# Patient Record
Sex: FEMALE | Race: WHITE | NOT HISPANIC OR LATINO | Employment: FULL TIME | URBAN - METROPOLITAN AREA
[De-identification: names, ages, dates, MRNs, and addresses within clinical notes are randomized per-mention and may not be internally consistent; named-entity substitution may affect disease eponyms.]

---

## 2017-01-24 ENCOUNTER — OFFICE VISIT (OUTPATIENT)
Dept: URGENT CARE | Facility: URGENT CARE | Age: 57
End: 2017-01-24
Payer: COMMERCIAL

## 2017-01-24 VITALS
HEART RATE: 90 BPM | BODY MASS INDEX: 20.89 KG/M2 | HEIGHT: 66 IN | TEMPERATURE: 97.3 F | OXYGEN SATURATION: 97 % | WEIGHT: 130 LBS | SYSTOLIC BLOOD PRESSURE: 109 MMHG | DIASTOLIC BLOOD PRESSURE: 78 MMHG

## 2017-01-24 DIAGNOSIS — J20.8 ACUTE BRONCHITIS DUE TO OTHER SPECIFIED ORGANISMS: Primary | ICD-10-CM

## 2017-01-24 DIAGNOSIS — J98.01 ACUTE BRONCHOSPASM: ICD-10-CM

## 2017-01-24 LAB
DEPRECATED S PYO AG THROAT QL EIA: NORMAL
MICRO REPORT STATUS: NORMAL
SPECIMEN SOURCE: NORMAL

## 2017-01-24 PROCEDURE — 87880 STREP A ASSAY W/OPTIC: CPT | Performed by: FAMILY MEDICINE

## 2017-01-24 PROCEDURE — 99213 OFFICE O/P EST LOW 20 MIN: CPT | Performed by: FAMILY MEDICINE

## 2017-01-24 PROCEDURE — 87081 CULTURE SCREEN ONLY: CPT | Performed by: FAMILY MEDICINE

## 2017-01-24 RX ORDER — ALBUTEROL SULFATE 90 UG/1
2 AEROSOL, METERED RESPIRATORY (INHALATION) EVERY 4 HOURS PRN
Qty: 1 INHALER | Refills: 0 | Status: SHIPPED | OUTPATIENT
Start: 2017-01-24 | End: 2017-09-01

## 2017-01-24 RX ORDER — BENZONATATE 200 MG/1
200 CAPSULE ORAL 3 TIMES DAILY PRN
Qty: 30 CAPSULE | Refills: 0 | Status: SHIPPED | OUTPATIENT
Start: 2017-01-24 | End: 2017-09-01

## 2017-01-24 NOTE — MR AVS SNAPSHOT
After Visit Summary   1/24/2017    Shelia Avendaño    MRN: [id number]           Patient Information     Date Of Birth          1960        Visit Information        Provider Department      1/24/2017 7:00 PM Anamaria Pham MD [facility]        Today's Diagnoses     Acute bronchitis due to other specified organisms    -  1       Care Instructions      What Is Acute Bronchitis?  Acute or short-term bronchitis last for days or weeks. It occurs when the bronchial tubes (airways in the lungs) are irritated by a virus, bacteria, or allergen. This causes a cough that produces yellow or greenish mucus.  Inside healthy lungs    Air travels in and out of the lungs through the airways. The linings of these airways produce sticky mucus. This mucus traps particles that enter the lungs. Tiny structures called cilia then sweep the particles out of the airways.     Healthy airway: Airways are normally open. Air moves in and out easily.      Healthy cilia: Tiny, hairlike cilia sweep mucus and particles up and out of the airways.   Lungs with bronchitis  Bronchitis often occurs with a cold or the flu virus. The airways become inflamed (red and swollen). There is a deep  hacking  cough from the extra mucus. Other symptoms may include:    Wheezing    Chest discomfort    Shortness of breath    Mild fever  A second infection, this time due to bacteria, may then occur. And airways irritated by allergens or smoke are more likely to get infected.        Inflamed airway: Inflammation and extra mucus narrow the airway, causing shortness of breath.      Impaired cilia: Extra mucus impairs cilia, causing congestion and wheezing. Smoking makes the problem worse.   Making a diagnosis  A physical exam, health history, and certain tests help your healthcare provider make the diagnosis.  Health history  Your healthcare provider will ask you about your symptoms.  The exam  Your provider listens  to your chest for signs of congestion. He or she may also check your ears, nose, and throat.  Possible tests    A sputum test for bacteria. This requires a sample of mucus from the lungs.    A nasal or throat swab for bacterial infection.    A chest X-ray if your healthcare provider thinks you have pneumonia.    Tests to check for an underlying condition, such as allergies, asthma, or COPD. You may need to see a specialist for more lung function testing.  Treating a cough  The main treatment for bronchitis is easing symptoms. Avoiding smoke, allergens, and other things that trigger coughing can often help. If the infection is bacterial, you may be given antibiotics. During the illness, it's important to get plenty of sleep. To ease symptoms:    Don t smoke, and avoid secondhand smoke.    Use a humidifier, or breathe in steam from a hot shower. This may help loosen mucus.    Drink a lot of water and juice. They can soothe the throat and may help thin mucus.    Sit up or use extra pillows when in bed to help lessen coughing and congestion.    Ask your provider about using cough medicine, pain and fever medicine, or a decongestant.  Antibiotics  Most cases of bronchitis are caused by cold or flu viruses. Antibiotics don t treat viral illness. Taking antibiotics when they are not needed increases your risk of getting an infection later that is antibiotic-resistant. Your provider will prescribe antibiotics if the infection is caused by bacteria. If they are prescribed:    Take the medicine until it is used up, even if symptoms have improved. If you don t, the bronchitis may come back.    Take them as directed. For instance, some medicines should be taken with food.    Ask your provider or pharmacist what side effects are common, and what to do about them.  Follow-up care  You should see your provider again in 2 to 3 weeks. By this time, symptoms should have improved. An infection that lasts longer may mean you have a  more serious problem.  Prevention    Avoid tobacco smoke. If you smoke, quit. Stay away from smoky places. Ask friends and family not to smoke around you, or in your home or car.    Get checked for allergies.    Ask your provider about getting a yearly flu shot, and pneumococcal or pneumonia shots.    Wash your hands often. This helps reduce the chance of picking up viruses that cause colds and flu.  Call your healthcare provider if:    Symptoms worsen, or new symptoms develop.    Breathing problems worsen or  become severe.    Symptoms don t get better within a week, or within 3 days of taking antibiotics.     9424-7645 [employer]. [address] [place] [address]. All rights reserved. This information is not intended as a substitute for professional medical care. Always follow your healthcare professional's instructions.              Follow-ups after your visit        Who to contact     If you have questions or need follow up information about today's clinic visit or your schedule please contact [facility] directly at [phone].  Normal or non-critical lab and imaging results will be communicated to you by [employer], letter or phone within 4 business days after the clinic has received the results. If you do not hear from us within 7 days, please contact the clinic through [employer] or phone. If you have a critical or abnormal lab result, we will notify you by phone as soon as possible.  Submit refill requests through [employer] or call your pharmacy and they will forward the refill request to us. Please allow 3 business days for your refill to be completed.          Additional Information About Your Visit        M[employer] Information     [employer] gives you secure access to your electronic health record. If you see a primary care provider, you can also send messages to your care team and make appointments. If you have questions, please call your primary care clinic.  If you  "do not have a primary care provider, please call [phone] and they will assist you.        Care EveryWhere ID     This is your Care EveryWhere ID. This could be used by other organizations to access your [facility] medical records  [id number]        Your Vitals Were     Pulse Temperature Height BMI (Body Mass Index) Pulse Oximetry Last Period    90 97.3  F (36.3  C) (Tympanic) 5' 6\" (1.676 m) 20.99 kg/m2 97% 08/28/2013       Blood Pressure from Last 3 Encounters:   01/24/17 109/78   10/12/16 103/73   08/31/16 106/74    Weight from Last 3 Encounters:   01/24/17 130 lb (58.968 kg)   10/12/16 139 lb 9.6 oz (63.322 kg)   08/31/16 140 lb (63.504 kg)              Today, you had the following     No orders found for display         Today's Medication Changes          These changes are accurate as of: 1/24/17  8:16 PM.  If you have any questions, ask your nurse or doctor.               Start taking these medicines.        Dose/Directions    albuterol 108 (90 BASE) MCG/ACT Inhaler   Commonly known as:  PROAIR HFA/PROVENTIL HFA/VENTOLIN HFA   Used for:  Acute bronchitis due to other specified organisms   Started by:  Anamaria Pham MD        Dose:  2 puff   Inhale 2 puffs into the lungs every 4 hours as needed for shortness of breath / dyspnea or wheezing (use with a spacer)   Quantity:  1 Inhaler   Refills:  0       benzonatate 200 MG capsule   Commonly known as:  TESSALON   Used for:  Acute bronchitis due to other specified organisms   Started by:  Anamaria Pham MD        Dose:  200 mg   Take 1 capsule (200 mg) by mouth 3 times daily as needed for cough   Quantity:  30 capsule   Refills:  0            Where to get your medicines      These medications were sent to [facility] [address] AT [address] [address]    Hours:  24-hours Phone:  [phone]    - albuterol 108 (90 BASE) MCG/ACT " Inhaler  - benzonatate 200 MG capsule             Primary Care Provider Office Phone # Fax #    Billy Huynh Mk Laboy [phone]       [place] [address]  [facility] [address]        Thank you!     Thank you for choosing [facility]  for your care. Our goal is always to provide you with excellent care. Hearing back from our patients is one way we can continue to improve our services. Please take a few minutes to complete the written survey that you may receive in the mail after your visit with us. Thank you!             Your Updated Medication List - Protect others around you: Learn how to safely use, store and throw away your medicines at www.disposemymeds.org.          This list is accurate as of: 1/24/17  8:16 PM.  Always use your most recent med list.                   Brand Name Dispense Instructions for use    ACYCLOVIR PO      Take 100 mg by mouth daily       albuterol 108 (90 BASE) MCG/ACT Inhaler    PROAIR HFA/PROVENTIL HFA/VENTOLIN HFA    1 Inhaler    Inhale 2 puffs into the lungs every 4 hours as needed for shortness of breath / dyspnea or wheezing (use with a spacer)       amitriptyline 10 MG tablet    ELAVIL     Take  by mouth At Bedtime.       benzonatate 200 MG capsule    TESSALON    30 capsule    Take 1 capsule (200 mg) by mouth 3 times daily as needed for cough       calcium-magnesium 500-250 MG Tabs per tablet    CALMAG     Take 1 tablet by mouth daily       CYTOMEL PO      Take  by mouth. 1/2 tab daily       * DETROL LA 4 MG 24 hr capsule   Generic drug:  tolterodine      Take 4 mg by mouth daily       * tolterodine 4 MG 24 hr capsule    DETROL LA    90 capsule    Take 1 capsule (4 mg) by mouth daily       SYNTHROID PO      Take 188 mcg by mouth daily . 1 mg daily       * Notice:  This list has 2 medication(s) that are the same as other medications prescribed for you. Read the directions carefully, and ask your doctor or  other care provider to review them with you.

## 2017-01-25 NOTE — PATIENT INSTRUCTIONS
What Is Acute Bronchitis?  Acute or short-term bronchitis last for days or weeks. It occurs when the bronchial tubes (airways in the lungs) are irritated by a virus, bacteria, or allergen. This causes a cough that produces yellow or greenish mucus.  Inside healthy lungs    Air travels in and out of the lungs through the airways. The linings of these airways produce sticky mucus. This mucus traps particles that enter the lungs. Tiny structures called cilia then sweep the particles out of the airways.     Healthy airway: Airways are normally open. Air moves in and out easily.      Healthy cilia: Tiny, hairlike cilia sweep mucus and particles up and out of the airways.   Lungs with bronchitis  Bronchitis often occurs with a cold or the flu virus. The airways become inflamed (red and swollen). There is a deep  hacking  cough from the extra mucus. Other symptoms may include:    Wheezing    Chest discomfort    Shortness of breath    Mild fever  A second infection, this time due to bacteria, may then occur. And airways irritated by allergens or smoke are more likely to get infected.        Inflamed airway: Inflammation and extra mucus narrow the airway, causing shortness of breath.      Impaired cilia: Extra mucus impairs cilia, causing congestion and wheezing. Smoking makes the problem worse.   Making a diagnosis  A physical exam, health history, and certain tests help your healthcare provider make the diagnosis.  Health history  Your healthcare provider will ask you about your symptoms.  The exam  Your provider listens to your chest for signs of congestion. He or she may also check your ears, nose, and throat.  Possible tests    A sputum test for bacteria. This requires a sample of mucus from the lungs.    A nasal or throat swab for bacterial infection.    A chest X-ray if your healthcare provider thinks you have pneumonia.    Tests to check for an underlying condition, such as allergies, asthma, or COPD. You may need  to see a specialist for more lung function testing.  Treating a cough  The main treatment for bronchitis is easing symptoms. Avoiding smoke, allergens, and other things that trigger coughing can often help. If the infection is bacterial, you may be given antibiotics. During the illness, it's important to get plenty of sleep. To ease symptoms:    Don t smoke, and avoid secondhand smoke.    Use a humidifier, or breathe in steam from a hot shower. This may help loosen mucus.    Drink a lot of water and juice. They can soothe the throat and may help thin mucus.    Sit up or use extra pillows when in bed to help lessen coughing and congestion.    Ask your provider about using cough medicine, pain and fever medicine, or a decongestant.  Antibiotics  Most cases of bronchitis are caused by cold or flu viruses. Antibiotics don t treat viral illness. Taking antibiotics when they are not needed increases your risk of getting an infection later that is antibiotic-resistant. Your provider will prescribe antibiotics if the infection is caused by bacteria. If they are prescribed:    Take the medicine until it is used up, even if symptoms have improved. If you don t, the bronchitis may come back.    Take them as directed. For instance, some medicines should be taken with food.    Ask your provider or pharmacist what side effects are common, and what to do about them.  Follow-up care  You should see your provider again in 2 to 3 weeks. By this time, symptoms should have improved. An infection that lasts longer may mean you have a more serious problem.  Prevention    Avoid tobacco smoke. If you smoke, quit. Stay away from smoky places. Ask friends and family not to smoke around you, or in your home or car.    Get checked for allergies.    Ask your provider about getting a yearly flu shot, and pneumococcal or pneumonia shots.    Wash your hands often. This helps reduce the chance of picking up viruses that cause colds and flu.  Call  your healthcare provider if:    Symptoms worsen, or new symptoms develop.    Breathing problems worsen or  become severe.    Symptoms don t get better within a week, or within 3 days of taking antibiotics.     7189-8366 [employer]. [address] [place] [address], [place] [address]. All rights reserved. This information is not intended as a substitute for professional medical care. Always follow your healthcare professional's instructions.

## 2017-01-25 NOTE — PROGRESS NOTES
SUBJECTIVE:   Shelia Avendaño is a 56 year old female who complains of painful cough for 3 days preceded by a cold for 3 weeks. Felt body aches, headache, fatigue, malaise, runny nose and sore throat with coughing starting 3 days ago.  No dyspnea.  No pleuritic chest pain.  Had a flu shot.   had similar symptoms.  She denies a history of no other unusual symptoms. She denies a history of asthma. Patient does not smoke cigarettes.     OBJECTIVE:  Vitals as noted by Nurse/MA above.  Appearance: in mild apparent distress secondary to coughing  ENT- bilateral TM normal without fluid or infection, neck without nodes, pharynx erythematous without exudate, sinuses nontender and nasal mucosa congested.   Chest - no tachypnea, retractions or cyanosis, mild and transient expiratory wheezing heard diffusely throughout both lungs and S1, S2 normal, no murmur, no gallop, rate regular.    Labs:   Results for orders placed or performed in visit on 01/24/17   Strep, Rapid Screen   Result Value Ref Range    Specimen Description Throat     Rapid Strep A Screen       NEGATIVE: No Group A streptococcal antigen detected by immunoassay, await   culture report.      Micro Report Status FINAL 01/24/2017       ASSESSMENT:   Bronchitis  Bronchospasm    PLAN:  As per orders.  Note for work given.   Symptomatic therapy suggested: push fluids, rest, use vaporizer or mist needed  and use cough suppressant of choice as needed. Call or return to clinic prn if these symptoms worsen or fail to improve as anticipated.  Anamaria Yoon MD

## 2017-01-25 NOTE — NURSING NOTE
"Chief Complaint   Patient presents with     Urgent Care     Cough     c/o cough for 3 days       Initial /78 mmHg  Pulse 90  Temp(Src) 97.3  F (36.3  C) (Tympanic)  Ht 5' 6\" (1.676 m)  Wt 130 lb (58.968 kg)  BMI 20.99 kg/m2  SpO2 97%  LMP 08/28/2013 Estimated body mass index is 20.99 kg/(m^2) as calculated from the following:    Height as of this encounter: 5' 6\" (1.676 m).    Weight as of this encounter: 130 lb (58.968 kg).  BP completed using cuff size: regular  Elba Whiteside MA    "

## 2017-01-26 ENCOUNTER — TELEPHONE (OUTPATIENT)
Dept: URGENT CARE | Facility: URGENT CARE | Age: 57
End: 2017-01-26

## 2017-01-26 LAB
BACTERIA SPEC CULT: ABNORMAL
MICRO REPORT STATUS: ABNORMAL
SPECIMEN SOURCE: ABNORMAL

## 2017-01-27 NOTE — TELEPHONE ENCOUNTER
Pt called as she isn't feeling better, having coughing fits but noted by Verónica Howard PA-C that strep culture was positive for strep A. Rx for a Zpack called into [facility] on [address]. Pt informed  samuel carrera

## 2017-02-06 ENCOUNTER — TELEPHONE (OUTPATIENT)
Dept: FAMILY MEDICINE | Facility: CLINIC | Age: 57
End: 2017-02-06

## 2017-03-01 ENCOUNTER — OFFICE VISIT (OUTPATIENT)
Dept: FAMILY MEDICINE | Facility: CLINIC | Age: 57
End: 2017-03-01
Payer: COMMERCIAL

## 2017-03-01 VITALS
BODY MASS INDEX: 22.18 KG/M2 | RESPIRATION RATE: 16 BRPM | DIASTOLIC BLOOD PRESSURE: 69 MMHG | HEIGHT: 66 IN | TEMPERATURE: 98.7 F | SYSTOLIC BLOOD PRESSURE: 101 MMHG | HEART RATE: 89 BPM | OXYGEN SATURATION: 95 % | WEIGHT: 138 LBS

## 2017-03-01 DIAGNOSIS — R53.83 OTHER FATIGUE: Primary | ICD-10-CM

## 2017-03-01 DIAGNOSIS — R35.0 URINARY FREQUENCY: ICD-10-CM

## 2017-03-01 DIAGNOSIS — R07.0 THROAT PAIN: ICD-10-CM

## 2017-03-01 LAB
ALBUMIN UR-MCNC: NEGATIVE MG/DL
APPEARANCE UR: CLEAR
BASOPHILS # BLD AUTO: 0 10E9/L (ref 0–0.2)
BASOPHILS NFR BLD AUTO: 0.3 %
BILIRUB UR QL STRIP: NEGATIVE
COLOR UR AUTO: YELLOW
DEPRECATED S PYO AG THROAT QL EIA: NORMAL
DIFFERENTIAL METHOD BLD: NORMAL
EOSINOPHIL # BLD AUTO: 0.1 10E9/L (ref 0–0.7)
EOSINOPHIL NFR BLD AUTO: 0.8 %
ERYTHROCYTE [DISTWIDTH] IN BLOOD BY AUTOMATED COUNT: 12.2 % (ref 10–15)
GLUCOSE UR STRIP-MCNC: NEGATIVE MG/DL
HCT VFR BLD AUTO: 38.1 % (ref 35–47)
HETEROPH AB SER QL: NEGATIVE
HGB BLD-MCNC: 12.7 G/DL (ref 11.7–15.7)
HGB UR QL STRIP: NEGATIVE
KETONES UR STRIP-MCNC: NEGATIVE MG/DL
LEUKOCYTE ESTERASE UR QL STRIP: NEGATIVE
LYMPHOCYTES # BLD AUTO: 2.5 10E9/L (ref 0.8–5.3)
LYMPHOCYTES NFR BLD AUTO: 31.2 %
MCH RBC QN AUTO: 31 PG (ref 26.5–33)
MCHC RBC AUTO-ENTMCNC: 33.3 G/DL (ref 31.5–36.5)
MCV RBC AUTO: 93 FL (ref 78–100)
MICRO REPORT STATUS: NORMAL
MONOCYTES # BLD AUTO: 0.6 10E9/L (ref 0–1.3)
MONOCYTES NFR BLD AUTO: 7.4 %
NEUTROPHILS # BLD AUTO: 4.8 10E9/L (ref 1.6–8.3)
NEUTROPHILS NFR BLD AUTO: 60.3 %
NITRATE UR QL: NEGATIVE
PH UR STRIP: 6 PH (ref 5–7)
PLATELET # BLD AUTO: 245 10E9/L (ref 150–450)
RBC # BLD AUTO: 4.1 10E12/L (ref 3.8–5.2)
SP GR UR STRIP: 1.01 (ref 1–1.03)
SPECIMEN SOURCE: NORMAL
URN SPEC COLLECT METH UR: NORMAL
UROBILINOGEN UR STRIP-ACNC: 0.2 EU/DL (ref 0.2–1)
WBC # BLD AUTO: 8 10E9/L (ref 4–11)

## 2017-03-01 PROCEDURE — 87880 STREP A ASSAY W/OPTIC: CPT | Performed by: FAMILY MEDICINE

## 2017-03-01 PROCEDURE — 81003 URINALYSIS AUTO W/O SCOPE: CPT | Performed by: FAMILY MEDICINE

## 2017-03-01 PROCEDURE — 87081 CULTURE SCREEN ONLY: CPT | Performed by: FAMILY MEDICINE

## 2017-03-01 PROCEDURE — 99213 OFFICE O/P EST LOW 20 MIN: CPT | Performed by: FAMILY MEDICINE

## 2017-03-01 PROCEDURE — 85025 COMPLETE CBC W/AUTO DIFF WBC: CPT | Performed by: FAMILY MEDICINE

## 2017-03-01 PROCEDURE — 80053 COMPREHEN METABOLIC PANEL: CPT | Performed by: FAMILY MEDICINE

## 2017-03-01 PROCEDURE — 36415 COLL VENOUS BLD VENIPUNCTURE: CPT | Performed by: FAMILY MEDICINE

## 2017-03-01 PROCEDURE — 86308 HETEROPHILE ANTIBODY SCREEN: CPT | Performed by: FAMILY MEDICINE

## 2017-03-01 NOTE — NURSING NOTE
"Chief Complaint   Patient presents with     Cough       Initial /69 (BP Location: Left arm)  Pulse 89  Temp 98.7  F (37.1  C) (Tympanic)  Resp 16  Ht 5' 6\" (1.676 m)  Wt 138 lb (62.6 kg)  LMP 08/28/2013  SpO2 95%  BMI 22.27 kg/m2 Estimated body mass index is 22.27 kg/(m^2) as calculated from the following:    Height as of this encounter: 5' 6\" (1.676 m).    Weight as of this encounter: 138 lb (62.6 kg).  Medication Reconciliation: complete     Mague Dumas CMA      "

## 2017-03-01 NOTE — PROGRESS NOTES
"  SUBJECTIVE:                                                    Shelia Avendaño is a 56 year old female who presents to clinic today for the following health issues:      RESPIRATORY SYMPTOMS      Duration: 5 weeks    Description  nasal congestion, rhinorrhea, sore throat, cough, headache, fatigue/malaise and nausea    Severity: moderate    Accompanying signs and symptoms: None    History (predisposing factors):  bronchitis    Precipitating or alleviating factors: None    Therapies tried and outcome:  none     She was treated for strep and bronchitis over a month ago, but she is still feeling very fatigued.  The sore throat is better (she notes she has had a chronic sore throat any problem with her vocal cords for 25 years), as is her cough.  No fever.  She notes she is s/p surgery for breast cancer and completed radiation at the end of December.  She states she started Tamoxifen about 6 weeks ago, but was advised that this does not cause fatigue.  She also has a long standing history of sleep disorder; she notes she has seen sleep management and does not have YOLIE and was recommended to take a sleeping aid . She has most recently been taking trazodone, which did seem to help her sleep a little longer (6 hours rather than 4), but she still wakes up a lot.    She has noticed some recent urinary frequency, but no dysuria or urge.  No change in BMs.  She has had some low-grade nausea with the fatigue, no vomiting.    She has not had a change of her thyroid dose, and is followed by her endocrinologist.    She reports a history of \"chronic lyme's disease.\"      She works as a psychologist.         Problem list and histories reviewed & adjusted, as indicated.  Additional history: as documented    Patient Active Problem List   Diagnosis     Closed fracture of one or more phalanges of foot     Dysphonia     Osteopenia     Postablative hypothyroidism     Malignant neoplasm of right female breast, unspecified site of breast " "(H)     Past Surgical History   Procedure Laterality Date     Tonsillectomy         Social History   Substance Use Topics     Smoking status: Never Smoker     Smokeless tobacco: Never Used     Alcohol use No     Family History   Problem Relation Age of Onset     Prostate Cancer Father 55     Thyroid Disease Mother          Current Outpatient Prescriptions   Medication Sig Dispense Refill     UNABLE TO FIND MEDICATION NAME: tomoxifin       albuterol (PROAIR HFA/PROVENTIL HFA/VENTOLIN HFA) 108 (90 BASE) MCG/ACT Inhaler Inhale 2 puffs into the lungs every 4 hours as needed for shortness of breath / dyspnea or wheezing (use with a spacer) 1 Inhaler 0     benzonatate (TESSALON) 200 MG capsule Take 1 capsule (200 mg) by mouth 3 times daily as needed for cough 30 capsule 0     tolterodine (DETROL LA) 4 MG 24 hr capsule Take 1 capsule (4 mg) by mouth daily 90 capsule 1     tolterodine (DETROL LA) 4 MG 24 hr capsule Take 4 mg by mouth daily       calcium-magnesium (CALMAG) 500-250 MG TABS Take 1 tablet by mouth daily       ACYCLOVIR PO Take 100 mg by mouth daily        amitriptyline (ELAVIL) 10 MG tablet Take  by mouth At Bedtime.       Levothyroxine Sodium (SYNTHROID PO) Take 188 mcg by mouth daily . 1 mg daily       Liothyronine Sodium (CYTOMEL PO) Take  by mouth. 1/2 tab daily       No Known Allergies    Reviewed and updated as needed this visit by clinical staff       Reviewed and updated as needed this visit by Provider         ROS:  Constitutional, HEENT, cardiovascular, pulmonary, gi and gu systems are negative, except as otherwise noted.    OBJECTIVE:                                                    /69 (BP Location: Left arm)  Pulse 89  Temp 98.7  F (37.1  C) (Tympanic)  Resp 16  Ht 5' 6\" (1.676 m)  Wt 138 lb (62.6 kg)  LMP 08/28/2013  SpO2 95%  BMI 22.27 kg/m2  Body mass index is 22.27 kg/(m^2).  GENERAL APPEARANCE: healthy, alert and no distress  EYES: Eyes grossly normal to inspection, PERRL and " conjunctivae and sclerae normal  HENT: ear canals and TM's normal and nose and mouth without ulcers or lesions  NECK: no adenopathy, no asymmetry, masses, or scars and thyroid normal to palpation  RESP: lungs clear to auscultation - no rales, rhonchi or wheezes  CV: regular rates and rhythm, normal S1 S2, no S3 or S4 and no murmur, click or rub  ABDOMEN: soft, nontender, without hepatosplenomegaly or masses and bowel sounds normal    Diagnostic Test Results:  Results for orders placed or performed in visit on 03/01/17 (from the past 24 hour(s))   Strep, Rapid Screen   Result Value Ref Range    Specimen Description Throat     Rapid Strep A Screen       NEGATIVE: No Group A streptococcal antigen detected by immunoassay, await   culture report.      Micro Report Status FINAL 03/01/2017    *UA reflex to Microscopic and Culture ([facility] (except [place] and [place])   Result Value Ref Range    Color Urine Yellow     Appearance Urine Clear     Glucose Urine Negative NEG mg/dL    Bilirubin Urine Negative NEG    Ketones Urine Negative NEG mg/dL    Specific Gravity Urine 1.015 1.003 - 1.035    Blood Urine Negative NEG    pH Urine 6.0 5.0 - 7.0 pH    Protein Albumin Urine Negative NEG mg/dL    Urobilinogen Urine 0.2 0.2 - 1.0 EU/dL    Nitrite Urine Negative NEG    Leukocyte Esterase Urine Negative NEG    Source Midstream Urine         ASSESSMENT/PLAN:                                                    Fatigue: after strep and bronchitis episode in January, in the context of recent radiation treatment for breast cancer, thyroid disease, chronic Lyme's disease and chronically poor sleep.  I discussed that this is most likely multifactorial.  I will check on routine labs with CBC, CMP (monitor tamoxifen), and will also check a UA given her complaint of urinary frequency (may see UTI with tamoxifen).  She plans to f/u with her endocrinologist for thyroid testing.     Carmel Gibson,  MD  [facility]

## 2017-03-01 NOTE — MR AVS SNAPSHOT
"              After Visit Summary   3/1/2017    Shelia Avendaño    MRN: [id number]           Patient Information     Date Of Birth          1960        Visit Information        Provider Department      3/1/2017 5:00 PM Carmel Gibson MD [facility]        Today's Diagnoses     Other fatigue    -  1    Throat pain        Urinary frequency           Follow-ups after your visit        Who to contact     If you have questions or need follow up information about today's clinic visit or your schedule please contact [facility] directly at [phone].  Normal or non-critical lab and imaging results will be communicated to you by Direct Access Softwarehart, letter or phone within 4 business days after the clinic has received the results. If you do not hear from us within 7 days, please contact the clinic through [employer] or phone. If you have a critical or abnormal lab result, we will notify you by phone as soon as possible.  Submit refill requests through Sw[employer] or call your pharmacy and they will forward the refill request to us. Please allow 3 business days for your refill to be completed.          Additional Information About Your Visit        MyChart Information     S[employer] gives you secure access to your electronic health record. If you see a primary care provider, you can also send messages to your care team and make appointments. If you have questions, please call your primary care clinic.  If you do not have a primary care provider, please call [phone] and they will assist you.        Care EveryWhere ID     This is your Care EveryWhere ID. This could be used by other organizations to access your [place] medical records  [id number]        Your Vitals Were     Pulse Temperature Respirations Height Last Period Pulse Oximetry    89 98.7  F (37.1  C) (Tympanic) 16 5' 6\" (1.676 m) 08/28/2013 95%    BMI (Body Mass Index)                   22.27 kg/m2            Blood Pressure from " Last 3 Encounters:   03/01/17 101/69   01/24/17 109/78   10/12/16 103/73    Weight from Last 3 Encounters:   03/01/17 138 lb (62.6 kg)   01/24/17 130 lb (59 kg)   10/12/16 139 lb 9.6 oz (63.3 kg)              We Performed the Following     *UA reflex to Microscopic and Culture ([facility], [place] and [facility] (except [place] and [place])     Beta strep group A culture     CBC with platelets and differential     Comprehensive metabolic panel     Mononucleosis screen     Strep, Rapid Screen        Primary Care Provider Office Phone # Fax #    Billy Huynh Mk Laboy [phone]       [address] [address]        Thank you!     Thank you for choosing [facility]  for your care. Our goal is always to provide you with excellent care. Hearing back from our patients is one way we can continue to improve our services. Please take a few minutes to complete the written survey that you may receive in the mail after your visit with us. Thank you!             Your Updated Medication List - Protect others around you: Learn how to safely use, store and throw away your medicines at www.disposemymeds.org.          This list is accurate as of: 3/1/17  5:40 PM.  Always use your most recent med list.                   Brand Name Dispense Instructions for use    ACYCLOVIR PO      Take 100 mg by mouth daily       albuterol 108 (90 BASE) MCG/ACT Inhaler    PROAIR HFA/PROVENTIL HFA/VENTOLIN HFA    1 Inhaler    Inhale 2 puffs into the lungs every 4 hours as needed for shortness of breath / dyspnea or wheezing (use with a spacer)       amitriptyline 10 MG tablet    ELAVIL     Take  by mouth At Bedtime.       benzonatate 200 MG capsule    TESSALON    30 capsule    Take 1 capsule (200 mg) by mouth 3 times daily as needed for cough       calcium-magnesium 500-250 MG Tabs per tablet    CALMAG     Take 1 tablet by mouth daily       CYTOMEL PO      Take   by mouth. 1/2 tab daily       * DETROL LA 4 MG 24 hr capsule   Generic drug:  tolterodine      Take 4 mg by mouth daily       * tolterodine 4 MG 24 hr capsule    DETROL LA    90 capsule    Take 1 capsule (4 mg) by mouth daily       SYNTHROID PO      Take 188 mcg by mouth daily . 1 mg daily       UNABLE TO FIND      MEDICATION NAME: tomoxifin       * Notice:  This list has 2 medication(s) that are the same as other medications prescribed for you. Read the directions carefully, and ask your doctor or other care provider to review them with you.

## 2017-03-02 LAB
ALBUMIN SERPL-MCNC: 4.1 G/DL (ref 3.4–5)
ALP SERPL-CCNC: 43 U/L (ref 40–150)
ALT SERPL W P-5'-P-CCNC: 25 U/L (ref 0–50)
ANION GAP SERPL CALCULATED.3IONS-SCNC: 8 MMOL/L (ref 3–14)
AST SERPL W P-5'-P-CCNC: 18 U/L (ref 0–45)
BILIRUB SERPL-MCNC: 0.3 MG/DL (ref 0.2–1.3)
BUN SERPL-MCNC: 16 MG/DL (ref 7–30)
CALCIUM SERPL-MCNC: 8.9 MG/DL (ref 8.5–10.1)
CHLORIDE SERPL-SCNC: 106 MMOL/L (ref 94–109)
CO2 SERPL-SCNC: 26 MMOL/L (ref 20–32)
CREAT SERPL-MCNC: 0.76 MG/DL (ref 0.52–1.04)
GFR SERPL CREATININE-BSD FRML MDRD: 79 ML/MIN/1.7M2
GLUCOSE SERPL-MCNC: 80 MG/DL (ref 70–99)
POTASSIUM SERPL-SCNC: 3.9 MMOL/L (ref 3.4–5.3)
PROT SERPL-MCNC: 7.2 G/DL (ref 6.8–8.8)
SODIUM SERPL-SCNC: 140 MMOL/L (ref 133–144)

## 2017-03-03 LAB
BACTERIA SPEC CULT: NORMAL
MICRO REPORT STATUS: NORMAL
SPECIMEN SOURCE: NORMAL

## 2017-04-06 ENCOUNTER — OFFICE VISIT (OUTPATIENT)
Dept: FAMILY MEDICINE | Facility: CLINIC | Age: 57
End: 2017-04-06
Payer: COMMERCIAL

## 2017-04-06 VITALS
WEIGHT: 138 LBS | DIASTOLIC BLOOD PRESSURE: 68 MMHG | OXYGEN SATURATION: 96 % | BODY MASS INDEX: 22.27 KG/M2 | HEART RATE: 88 BPM | TEMPERATURE: 98.7 F | SYSTOLIC BLOOD PRESSURE: 102 MMHG

## 2017-04-06 DIAGNOSIS — J06.9 UPPER RESPIRATORY TRACT INFECTION, UNSPECIFIED TYPE: Primary | ICD-10-CM

## 2017-04-06 PROCEDURE — 99213 OFFICE O/P EST LOW 20 MIN: CPT | Performed by: NURSE PRACTITIONER

## 2017-04-06 NOTE — PROGRESS NOTES
SUBJECTIVE:                                                    Shelia Avendaño is a 56 year old female who presents to clinic today for the following health issues:      RESPIRATORY SYMPTOMS      Duration: 10 days and getting worst    Description  nasal congestion, rhinorrhea, cough and headache    Severity: moderate    Accompanying signs and symptoms: tightness around the chest and fatigue     History (predisposing factors):  strep exposure in JAN2017    Precipitating or alleviating factors: stress, work and being awake    Therapies tried and outcome:  rest and fluids oral decongestant and inhaler      Symptom started 10 days ago as URI, over the weekend settled in chest, cough which has improved the last several days.  Cough was productive.  Was improving yesterday but worse again today.  Generalized headache, cough.  No facial pressure.  Does have congestion.  No wheezing or shortness of breath.  No fever.    Similar illness in January.    Started on tamoxifen mid January.  Also started trazodone for sleep.  Started tapering off trazodone with improving energy levels.  Tried going off it completely and didn't sleep at all.    10mg amitriptyline x 10 years for chronic lymes.      Works in [employer].  Very busy season.      Past medical history:  History or right breast cancer diagnosed on screening mammo 10/2016, s/p lumpectomy.    Hypothyroid due to radiation tx for hyperthyroidism, sees her usual FP for monitoring and also for chronic lymes disease.     Chronic sleep disorder since lyme's disease, taking amitriptyline and a variety of herbal supplements.    Postmenopausal x2-3 years. Using estradiol 10mg vaginal tablet. Has discussed this with her surgeon and will discuss with oncologist.     Overactive bladder: she has symptoms infrequently, so only takes detrol LA as needed      Patient Active Problem List   Diagnosis     Closed fracture of one or more phalanges of foot     Dysphonia      Osteopenia     Postablative hypothyroidism     Malignant neoplasm of right female breast, unspecified site of breast (H)     Past Surgical History:   Procedure Laterality Date     TONSILLECTOMY         Social History   Substance Use Topics     Smoking status: Never Smoker     Smokeless tobacco: Never Used     Alcohol use No     Family History   Problem Relation Age of Onset     Prostate Cancer Father 55     Thyroid Disease Mother          Current Outpatient Prescriptions   Medication Sig Dispense Refill     UNABLE TO FIND MEDICATION NAME: tomoxifin       albuterol (PROAIR HFA/PROVENTIL HFA/VENTOLIN HFA) 108 (90 BASE) MCG/ACT Inhaler Inhale 2 puffs into the lungs every 4 hours as needed for shortness of breath / dyspnea or wheezing (use with a spacer) 1 Inhaler 0     benzonatate (TESSALON) 200 MG capsule Take 1 capsule (200 mg) by mouth 3 times daily as needed for cough 30 capsule 0     tolterodine (DETROL LA) 4 MG 24 hr capsule Take 1 capsule (4 mg) by mouth daily 90 capsule 1     tolterodine (DETROL LA) 4 MG 24 hr capsule Take 4 mg by mouth daily       calcium-magnesium (CALMAG) 500-250 MG TABS Take 1 tablet by mouth daily       ACYCLOVIR PO Take 100 mg by mouth daily        amitriptyline (ELAVIL) 10 MG tablet Take  by mouth At Bedtime.       Levothyroxine Sodium (SYNTHROID PO) Take 188 mcg by mouth daily . 1 mg daily       Liothyronine Sodium (CYTOMEL PO) Take  by mouth. 1/2 tab daily         ROS:  Const, HEENT, Resp as above, otherwise negative       OBJECTIVE:                                                    /68 (BP Location: Left arm, Patient Position: Chair, Cuff Size: Adult Regular)  Pulse 88  Temp 98.7  F (37.1  C) (Tympanic)  Wt 138 lb (62.6 kg)  LMP 08/28/2013  SpO2 96%  BMI 22.27 kg/m2   GENERAL APPEARANCE: healthy, alert and no distress  EYES: Eyes grossly normal to inspection and conjunctivae and sclerae normal  HENT: ear canals and TM's normal and nose and mouth without ulcers or lesions.   Tonsils not visible bilaterally.  No pain with sinus palpation.  NECK: no adenopathy  RESP: lungs clear to auscultation - no rales, rhonchi or wheezes  CV: regular rates and rhythm, normal S1 S2, no S3 or S4 and no murmur, click or rub  PSYCH: mentation appears normal and affect normal/bright        ASSESSMENT/PLAN:                                                    (J06.9) Upper respiratory tract infection, unspecified type  (primary encounter diagnosis)  Plan: discussed likely viral etiology with self limited course, recommend symptomatic cares, see patient instructions.  Follow up if not improving by early next week, given duration and double sickening would consider tx for sinusitis with augmentin    See Patient Instructions    Helen Moscoso, ALEXA  [facility]    Patient Instructions   1.  I think you have a viral infection that will improve with time.      For nasal congestion start afrin nasal spray 2-3 sprays to each nostril twice a day for 3 days.      Start sinus rinse like neti pot 2-3 times a day.  Use distilled or boiled water, mix with salt,    to 1 teaspoon of salt to each 16 ounces (two cups) of warm water.      Consider over the counter antihistamine like allegra or zyrtec to dry up secretions. Plenty of fluids and rest. Ibuprofen and heat packs for facial pain/pressure and headache.      mychart me if worsening, fever above 100.2, or not improving in the next 3-4 days

## 2017-04-06 NOTE — MR AVS SNAPSHOT
After Visit Summary   4/6/2017    Shelia Avendaño    MRN: [id number]           Patient Information     Date Of Birth          1960        Visit Information        Provider Department      4/6/2017 4:20 PM Helen Moscoso APRN CNP [facility]        Today's Diagnoses     Upper respiratory tract infection, unspecified type    -  1      Care Instructions    1.  I think you have a viral infection that will improve with time.      For nasal congestion start afrin nasal spray 2-3 sprays to each nostril twice a day for 3 days.      Start sinus rinse like neti pot 2-3 times a day.  Use distilled or boiled water, mix with salt,    to 1 teaspoon of salt to each 16 ounces (two cups) of warm water.      Consider over the counter antihistamine like allegra or zyrtec to dry up secretions. Plenty of fluids and rest. Ibuprofen and heat packs for facial pain/pressure and headache.      mychart me if worsening, fever above 100.2, or not improving in the next 3-4 days          Follow-ups after your visit        Who to contact     If you have questions or need follow up information about today's clinic visit or your schedule please contact [facility] directly at [phone].  Normal or non-critical lab and imaging results will be communicated to you by MyChart, letter or phone within 4 business days after the clinic has received the results. If you do not hear from us within 7 days, please contact the clinic through [employer] or phone. If you have a critical or abnormal lab result, we will notify you by phone as soon as possible.  Submit refill requests through Auto [employer]. or call your pharmacy and they will forward the refill request to us. Please allow 3 business days for your refill to be completed.          Additional Information About Your Visit        MyChart Information     [employer] gives you secure access to your electronic health record. If you see a primary care provider, you  can also send messages to your care team and make appointments. If you have questions, please call your primary care clinic.  If you do not have a primary care provider, please call [phone] and they will assist you.        Care EveryWhere ID     This is your Care EveryWhere ID. This could be used by other organizations to access your [place] medical records  [id number]        Your Vitals Were     Pulse Temperature Last Period Pulse Oximetry BMI (Body Mass Index)       88 98.7  F (37.1  C) (Tympanic) 08/28/2013 96% 22.27 kg/m2        Blood Pressure from Last 3 Encounters:   04/06/17 102/68   03/01/17 101/69   01/24/17 109/78    Weight from Last 3 Encounters:   04/06/17 138 lb (62.6 kg)   03/01/17 138 lb (62.6 kg)   01/24/17 130 lb (59 kg)              Today, you had the following     No orders found for display       Primary Care Provider Office Phone # Fax #    Billy Huynh Mk Laboy [phone]       [place] [address]  [facility] [address]        Thank you!     Thank you for choosing [facility]  for your care. Our goal is always to provide you with excellent care. Hearing back from our patients is one way we can continue to improve our services. Please take a few minutes to complete the written survey that you may receive in the mail after your visit with us. Thank you!             Your Updated Medication List - Protect others around you: Learn how to safely use, store and throw away your medicines at www.disposemymeds.org.          This list is accurate as of: 4/6/17  4:53 PM.  Always use your most recent med list.                   Brand Name Dispense Instructions for use    ACYCLOVIR PO      Take 100 mg by mouth daily       albuterol 108 (90 BASE) MCG/ACT Inhaler    PROAIR HFA/PROVENTIL HFA/VENTOLIN HFA    1 Inhaler    Inhale 2 puffs into the lungs every 4 hours as needed for shortness of breath / dyspnea or wheezing (use with a spacer)        amitriptyline 10 MG tablet    ELAVIL     Take  by mouth At Bedtime.       benzonatate 200 MG capsule    TESSALON    30 capsule    Take 1 capsule (200 mg) by mouth 3 times daily as needed for cough       calcium-magnesium 500-250 MG Tabs per tablet    CALMAG     Take 1 tablet by mouth daily       CYTOMEL PO      Take  by mouth. 1/2 tab daily       * DETROL LA 4 MG 24 hr capsule   Generic drug:  tolterodine      Take 4 mg by mouth daily       * tolterodine 4 MG 24 hr capsule    DETROL LA    90 capsule    Take 1 capsule (4 mg) by mouth daily       SYNTHROID PO      Take 188 mcg by mouth daily . 1 mg daily       UNABLE TO FIND      MEDICATION NAME: tomoxifin       * Notice:  This list has 2 medication(s) that are the same as other medications prescribed for you. Read the directions carefully, and ask your doctor or other care provider to review them with you.

## 2017-04-06 NOTE — NURSING NOTE
"Chief Complaint   Patient presents with     URI       Initial /68 (BP Location: Left arm, Patient Position: Chair, Cuff Size: Adult Regular)  Pulse 120  Temp 98.7  F (37.1  C) (Tympanic)  Wt 138 lb (62.6 kg)  LMP 08/28/2013  SpO2 96%  BMI 22.27 kg/m2 Estimated body mass index is 22.27 kg/(m^2) as calculated from the following:    Height as of 3/1/17: 5' 6\" (1.676 m).    Weight as of this encounter: 138 lb (62.6 kg).  Medication Reconciliation: complete Juan Jose Carbajal MA      "

## 2017-04-06 NOTE — PATIENT INSTRUCTIONS
1.  I think you have a viral infection that will improve with time.      For nasal congestion start afrin nasal spray 2-3 sprays to each nostril twice a day for 3 days.      Start sinus rinse like neti pot 2-3 times a day.  Use distilled or boiled water, mix with salt,    to 1 teaspoon of salt to each 16 ounces (two cups) of warm water.      Consider over the counter antihistamine like allegra or zyrtec to dry up secretions. Plenty of fluids and rest. Ibuprofen and heat packs for facial pain/pressure and headache.      mychart me if worsening, fever above 100.2, or not improving in the next 3-4 days

## 2017-04-06 NOTE — LETTER
[facility]  [address] [facility] [address]  Phone: [phone]    April 6, 2017        Shelia Avendaño  [address] [facility] [address]          To whom it may concern:    RE: Shelia Avendaño    Patient was seen and treated today at our clinic and missed work.  She will be absent from work Friday 4/7/17 due to medical illness.    Please contact me for questions or concerns.      Sincerely,        MARILOU Wolf CNP

## 2017-04-12 ENCOUNTER — OFFICE VISIT (OUTPATIENT)
Dept: URGENT CARE | Facility: URGENT CARE | Age: 57
End: 2017-04-12
Payer: COMMERCIAL

## 2017-04-12 VITALS
TEMPERATURE: 97.7 F | SYSTOLIC BLOOD PRESSURE: 104 MMHG | DIASTOLIC BLOOD PRESSURE: 66 MMHG | WEIGHT: 138 LBS | HEIGHT: 66 IN | OXYGEN SATURATION: 98 % | BODY MASS INDEX: 22.18 KG/M2 | HEART RATE: 80 BPM

## 2017-04-12 DIAGNOSIS — J20.9 ACUTE BRONCHITIS WITH SYMPTOMS > 10 DAYS: ICD-10-CM

## 2017-04-12 DIAGNOSIS — R07.0 THROAT PAIN: Primary | ICD-10-CM

## 2017-04-12 LAB
DEPRECATED S PYO AG THROAT QL EIA: NORMAL
MICRO REPORT STATUS: NORMAL
SPECIMEN SOURCE: NORMAL

## 2017-04-12 PROCEDURE — 99213 OFFICE O/P EST LOW 20 MIN: CPT | Performed by: PHYSICIAN ASSISTANT

## 2017-04-12 PROCEDURE — 87880 STREP A ASSAY W/OPTIC: CPT | Performed by: INTERNAL MEDICINE

## 2017-04-12 PROCEDURE — 87081 CULTURE SCREEN ONLY: CPT | Performed by: INTERNAL MEDICINE

## 2017-04-12 RX ORDER — AZITHROMYCIN 250 MG/1
TABLET, FILM COATED ORAL
Qty: 6 TABLET | Refills: 0 | Status: SHIPPED | OUTPATIENT
Start: 2017-04-12 | End: 2017-09-01

## 2017-04-12 NOTE — MR AVS SNAPSHOT
"              After Visit Summary   4/12/2017    Shelia Avendaño    MRN: [id number]           Patient Information     Date Of Birth          1960        Visit Information        Provider Department      4/12/2017 7:00 PM Vicky Howard PA-C [facility]        Today's Diagnoses     Throat pain    -  1    Acute bronchitis with symptoms > 10 days           Follow-ups after your visit        Who to contact     If you have questions or need follow up information about today's clinic visit or your schedule please contact [facility] directly at [phone].  Normal or non-critical lab and imaging results will be communicated to you by MyChart, letter or phone within 4 business days after the clinic has received the results. If you do not hear from us within 7 days, please contact the clinic through [employer] or phone. If you have a critical or abnormal lab result, we will notify you by phone as soon as possible.  Submit refill requests through [employer] or call your pharmacy and they will forward the refill request to us. Please allow 3 business days for your refill to be completed.          Additional Information About Your Visit        MyChart Information     [employer] gives you secure access to your electronic health record. If you see a primary care provider, you can also send messages to your care team and make appointments. If you have questions, please call your primary care clinic.  If you do not have a primary care provider, please call [phone] and they will assist you.        Care EveryWhere ID     This is your Care EveryWhere ID. This could be used by other organizations to access your [place] medical records  [id number]        Your Vitals Were     Pulse Temperature Height Last Period Pulse Oximetry BMI (Body Mass Index)    80 97.7  F (36.5  C) (Oral) 5' 6\" (1.676 m) 08/28/2013 98% 22.27 kg/m2       Blood Pressure from Last 3 Encounters:   04/12/17 " 104/66   04/06/17 102/68   03/01/17 101/69    Weight from Last 3 Encounters:   04/12/17 138 lb (62.6 kg)   04/06/17 138 lb (62.6 kg)   03/01/17 138 lb (62.6 kg)              We Performed the Following     Beta strep group A culture     Strep, Rapid Screen          Today's Medication Changes          These changes are accurate as of: 4/12/17  8:41 PM.  If you have any questions, ask your nurse or doctor.               Start taking these medicines.        Dose/Directions    azithromycin 250 MG tablet   Commonly known as:  ZITHROMAX   Used for:  Acute bronchitis with symptoms > 10 days   Started by:  Vicky Howard PA-C        Two tablets first day, then one tablet daily for four days.   Quantity:  6 tablet   Refills:  0         Stop taking these medicines if you haven't already. Please contact your care team if you have questions.     UNABLE TO FIND   Stopped by:  Vicky Howard PA-C                Where to get your medicines      Some of these will need a paper prescription and others can be bought over the counter.  Ask your nurse if you have questions.     Bring a paper prescription for each of these medications     azithromycin 250 MG tablet                Primary Care Provider Office Phone # Fax #    Billy Huynh Mk Laboy [phone]       [address] [address]        Thank you!     Thank you for choosing [facility]  for your care. Our goal is always to provide you with excellent care. Hearing back from our patients is one way we can continue to improve our services. Please take a few minutes to complete the written survey that you may receive in the mail after your visit with us. Thank you!             Your Updated Medication List - Protect others around you: Learn how to safely use, store and throw away your medicines at www.disposemymeds.org.          This list is accurate as of: 4/12/17  8:41 PM.  Always use  your most recent med list.                   Brand Name Dispense Instructions for use    ACYCLOVIR PO      Take 100 mg by mouth daily       albuterol 108 (90 BASE) MCG/ACT Inhaler    PROAIR HFA/PROVENTIL HFA/VENTOLIN HFA    1 Inhaler    Inhale 2 puffs into the lungs every 4 hours as needed for shortness of breath / dyspnea or wheezing (use with a spacer)       amitriptyline 10 MG tablet    ELAVIL     Take  by mouth At Bedtime.       azithromycin 250 MG tablet    ZITHROMAX    6 tablet    Two tablets first day, then one tablet daily for four days.       benzonatate 200 MG capsule    TESSALON    30 capsule    Take 1 capsule (200 mg) by mouth 3 times daily as needed for cough       calcium-magnesium 500-250 MG Tabs per tablet    CALMAG     Take 1 tablet by mouth daily       CYTOMEL PO      Take  by mouth. 1/2 tab daily       * DETROL LA 4 MG 24 hr capsule   Generic drug:  tolterodine      Take 4 mg by mouth daily Reported on 4/12/2017       * tolterodine 4 MG 24 hr capsule    DETROL LA    90 capsule    Take 1 capsule (4 mg) by mouth daily       SYNTHROID PO      Take 188 mcg by mouth daily . 1 mg daily       TAMOXIFEN CITRATE PO          TRAZODONE HCL PO          * Notice:  This list has 2 medication(s) that are the same as other medications prescribed for you. Read the directions carefully, and ask your doctor or other care provider to review them with you.

## 2017-04-13 LAB
BACTERIA SPEC CULT: NORMAL
MICRO REPORT STATUS: NORMAL
SPECIMEN SOURCE: NORMAL

## 2017-04-13 NOTE — NURSING NOTE
"Chief Complaint   Patient presents with     Urgent Care     URI     sick, concern of bronchitis, symptoms over 2 weeks, bad cough, Seen Thursday at [facility], holding off on antibiotics. would like to be checked for strep. tight in chest, fatigue       Initial /66  Pulse 80  Temp 97.7  F (36.5  C) (Oral)  Ht 5' 6\" (1.676 m)  Wt 138 lb (62.6 kg)  LMP 08/28/2013  SpO2 98%  BMI 22.27 kg/m2 Estimated body mass index is 22.27 kg/(m^2) as calculated from the following:    Height as of this encounter: 5' 6\" (1.676 m).    Weight as of this encounter: 138 lb (62.6 kg).  Medication Reconciliation: complete  "

## 2017-04-13 NOTE — PROGRESS NOTES
"HPI:  Shelia is a 57 yo female who presents for cough x 15 days.  Also reports sore throat and would like strep test.  She reports she does not feel well has not felt well for several weeks.  She reports the cough has not worsened but has not improved either.  Denies fever, SOB, hx of asthma, wheezing.    Patient was seen on 4/6/17 at [facility] and it was decided to hold off on antbx at that time.  She reports is traveling internationally soon.  Concern for \"pop\" in her left ear.  Patient has hx of lymes dx.  Hx of breast cancer - Taking Tamoxifen for breast cancer.    ROS:  See HPI      PE:  Vitals & nursing notes reviewed. B/P: 104/66, T: 97.7, P: 80, R: Data Unavailable  Constitutional:  Alert, well nourished, well-developed, NAD  Head:  Atraumatic, normocephalic  Eyes:  Perrla, EOMI, conjunctiva:  Pink   Sclera:  Anicteric  Ears:  Canals clear BL, TM pearly BL  Throat:  No erythema, exudates, or edema to postoropharynx  Neck:  Supple, no cervical LAD  Lungs:  CTA, no wheezes, rhonchi, or rales  CV:  RRR,  no murmur appreciated    ASSESSMENT:  1. Bronchitis > 10 days  2.  Throat pain  (primary encounter diagnosis)  Comment: RST negative.  Bronchitis may be viral.  Patient to hang onto zpak for a few more days, if no improvement, begin course.  Recommend she make FU appt with PCP prior to leaving for internatinal trip for further workup if continued fatigue and not feeling well.  Patient hx of lyme's and recent start of Tamoxifen - consider these may be contributors (ontop of virus) to her fatigue & general malaise.  Plan: azithromycin (ZITHROMAX) 250 MG tablet      Rest.  Push fluids.  Cool mist humdifier.  Warm liquids and/or honey for cough spasms and suppression.  Tylenol or advil for pain, HA, muscles aches, & fever PRN.    F/U with PCP if sx persist or worsen.      "

## 2017-04-17 ENCOUNTER — OFFICE VISIT (OUTPATIENT)
Dept: FAMILY MEDICINE | Facility: CLINIC | Age: 57
End: 2017-04-17
Payer: COMMERCIAL

## 2017-04-17 ENCOUNTER — RADIANT APPOINTMENT (OUTPATIENT)
Dept: GENERAL RADIOLOGY | Facility: CLINIC | Age: 57
End: 2017-04-17
Attending: FAMILY MEDICINE
Payer: COMMERCIAL

## 2017-04-17 VITALS
TEMPERATURE: 98.1 F | WEIGHT: 135 LBS | RESPIRATION RATE: 18 BRPM | BODY MASS INDEX: 21.79 KG/M2 | SYSTOLIC BLOOD PRESSURE: 105 MMHG | HEART RATE: 95 BPM | OXYGEN SATURATION: 99 % | DIASTOLIC BLOOD PRESSURE: 75 MMHG

## 2017-04-17 DIAGNOSIS — J20.9 ACUTE BRONCHITIS, UNSPECIFIED ORGANISM: Primary | ICD-10-CM

## 2017-04-17 DIAGNOSIS — R05.9 COUGH: ICD-10-CM

## 2017-04-17 DIAGNOSIS — R06.02 SOB (SHORTNESS OF BREATH): ICD-10-CM

## 2017-04-17 PROCEDURE — 71020 XR CHEST 2 VW: CPT

## 2017-04-17 PROCEDURE — 99213 OFFICE O/P EST LOW 20 MIN: CPT | Performed by: FAMILY MEDICINE

## 2017-04-17 ASSESSMENT — ENCOUNTER SYMPTOMS
HEMOPTYSIS: 0
SPUTUM PRODUCTION: 0
SHORTNESS OF BREATH: 1
CHILLS: 0
HEADACHES: 1
WHEEZING: 0
DIAPHORESIS: 0
NAUSEA: 0
FEVER: 0
SORE THROAT: 1
COUGH: 1
VOMITING: 0

## 2017-04-17 NOTE — MR AVS SNAPSHOT
After Visit Summary   4/17/2017    Shelia Avendaño    MRN: [id number]           Patient Information     Date Of Birth          1960        Visit Information        Provider Department      4/17/2017 10:40 AM Carmel Gibson MD [facility]        Today's Diagnoses     Acute bronchitis, unspecified organism    -  1    Cough        SOB (shortness of breath)           Follow-ups after your visit        Who to contact     If you have questions or need follow up information about today's clinic visit or your schedule please contact [facility] directly at [phone].  Normal or non-critical lab and imaging results will be communicated to you by [email or web address], letter or phone within 4 business days after the clinic has received the results. If you do not hear from us within 7 days, please contact the clinic through [employer] or phone. If you have a critical or abnormal lab result, we will notify you by phone as soon as possible.  Submit refill requests through [employer] or call your pharmacy and they will forward the refill request to us. Please allow 3 business days for your refill to be completed.          Additional Information About Your Visit        MyChart Information     [employer] gives you secure access to your electronic health record. If you see a primary care provider, you can also send messages to your care team and make appointments. If you have questions, please call your primary care clinic.  If you do not have a primary care provider, please call [phone] and they will assist you.        Care EveryWhere ID     This is your Care EveryWhere ID. This could be used by other organizations to access your [place] medical records  [id number]        Your Vitals Were     Pulse Temperature Respirations Last Period Pulse Oximetry BMI (Body Mass Index)    95 98.1  F (36.7  C) (Oral) 18 08/28/2013 99% 21.79 kg/m2       Blood Pressure from Last 3 Encounters:    04/17/17 105/75   04/12/17 104/66   04/06/17 102/68    Weight from Last 3 Encounters:   04/17/17 135 lb (61.2 kg)   04/12/17 138 lb (62.6 kg)   04/06/17 138 lb (62.6 kg)               Primary Care Provider Office Phone # Fax #    Billy Huynh Mk Laboy [phone]       [place] [address] [address]  [facility] [address]        Thank you!     Thank you for choosing [facility]  for your care. Our goal is always to provide you with excellent care. Hearing back from our patients is one way we can continue to improve our services. Please take a few minutes to complete the written survey that you may receive in the mail after your visit with us. Thank you!             Your Updated Medication List - Protect others around you: Learn how to safely use, store and throw away your medicines at www.disposemymeds.org.          This list is accurate as of: 4/17/17 11:23 AM.  Always use your most recent med list.                   Brand Name Dispense Instructions for use    ACYCLOVIR PO      Take 100 mg by mouth daily       albuterol 108 (90 BASE) MCG/ACT Inhaler    PROAIR HFA/PROVENTIL HFA/VENTOLIN HFA    1 Inhaler    Inhale 2 puffs into the lungs every 4 hours as needed for shortness of breath / dyspnea or wheezing (use with a spacer)       amitriptyline 10 MG tablet    ELAVIL     Take  by mouth At Bedtime.       azithromycin 250 MG tablet    ZITHROMAX    6 tablet    Two tablets first day, then one tablet daily for four days.       benzonatate 200 MG capsule    TESSALON    30 capsule    Take 1 capsule (200 mg) by mouth 3 times daily as needed for cough       calcium-magnesium 500-250 MG Tabs per tablet    CALMAG     Take 1 tablet by mouth daily       CYTOMEL PO      Take  by mouth. 1/2 tab daily       * DETROL LA 4 MG 24 hr capsule   Generic drug:  tolterodine      Take 4 mg by mouth daily Reported on 4/12/2017       * tolterodine 4 MG 24 hr capsule    DETROL LA    90  capsule    Take 1 capsule (4 mg) by mouth daily       SYNTHROID PO      Take 188 mcg by mouth daily . 1 mg daily       TAMOXIFEN CITRATE PO          TRAZODONE HCL PO          * Notice:  This list has 2 medication(s) that are the same as other medications prescribed for you. Read the directions carefully, and ask your doctor or other care provider to review them with you.

## 2017-04-17 NOTE — NURSING NOTE
"No chief complaint on file.      Initial /75 (BP Location: Left arm, Patient Position: Chair, Cuff Size: Adult Small)  Pulse 95  Temp 98.1  F (36.7  C) (Oral)  Resp 18  Wt 135 lb (61.2 kg)  LMP 08/28/2013  SpO2 99%  BMI 21.79 kg/m2 Estimated body mass index is 21.79 kg/(m^2) as calculated from the following:    Height as of 4/12/17: 5' 6\" (1.676 m).    Weight as of this encounter: 135 lb (61.2 kg).  Medication Reconciliation: complete Mora Albert/    "

## 2017-04-17 NOTE — PROGRESS NOTES
HPI CC:  57 yo F presents in f/u of bronchitis with new chest tightness and ongoing fatigue.  ED/UC Followup:    Facility:  [facility]  Date of visit: 4/12/2017  Reason for visit: bronchitis  Current Status: Does not feel any better then last week       Five days after starting tamoxifen back in January, she had bronchitis and strep, treated with antibiotics with zpack.  She recovered within a few days from the acute illness, though continued to feel fatigued.  She did well until three weeks ago, when she developed another episode of bronchitis.   It started out with typical URI symptoms such as rhinorrhea and cough.   She was able to be at work, but then three days later went into her chest.  On day 9 was starting to feel better.  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  She was up all night coughing, and her cough seemed to get worse; she woke up the next morning with headache and feeling much worse.  On day 10 of the illness (4/6/17), she was seen at  clinic.  They decided to hold off on antibiotics at that time.  She rested for 4 days before returning to work, feeling maybe a little better for the rest.  However, around day 17, she was feeling worse again, with cough and headache as well as new sore throat.  She came in at that point 4/12/17 to urgent care to get a strep test, which was negative.  The next day, her symptoms were worse, so she decided to start the z-pack she was given.  Today is day 4 of that course, and she is feeling about the same, but she has a new symptom of chest tightness.  This started over the past two days; first noticed at rest while watching a movie; it feels like a metal band around her chest.  She has not noticed whether it is worse with any exertion.  It has a constant, burning quality.  The cough, which had previously been wet, is now dry and more infrequent.  She feels hot, but  does not have any temps over about 98.  She denies any chest pains other than this constant tight, burning sensation.     She has an appt with her PCP, whom she states is her chronic Lyme's disease provider.  She has a trip planned overseas to celebrate recovering from breast cancer in about 3 weeks .   No h/o asthma or tobacco use.    Review of Systems   Constitutional: Positive for malaise/fatigue. Negative for chills, diaphoresis and fever.   HENT: Positive for sore throat. Negative for congestion, ear discharge and ear pain.    Respiratory: Positive for cough and shortness of breath. Negative for hemoptysis, sputum production and wheezing.    Cardiovascular: Negative for chest pain.   Gastrointestinal: Negative for nausea and vomiting.   Skin: Negative for rash.   Neurological: Positive for headaches.       No Known Allergies  Current Outpatient Prescriptions   Medication     TAMOXIFEN CITRATE PO     TRAZODONE HCL PO     azithromycin (ZITHROMAX) 250 MG tablet     albuterol (PROAIR HFA/PROVENTIL HFA/VENTOLIN HFA) 108 (90 BASE) MCG/ACT Inhaler     benzonatate (TESSALON) 200 MG capsule     tolterodine (DETROL LA) 4 MG 24 hr capsule     tolterodine (DETROL LA) 4 MG 24 hr capsule     calcium-magnesium (CALMAG) 500-250 MG TABS     ACYCLOVIR PO     amitriptyline (ELAVIL) 10 MG tablet     Levothyroxine Sodium (SYNTHROID PO)     Liothyronine Sodium (CYTOMEL PO)     No current facility-administered medications for this visit.      Active Ambulatory Problems     Diagnosis Date Noted     Closed fracture of one or more phalanges of foot 01/19/2012     Dysphonia 05/18/2015     Osteopenia      Postablative hypothyroidism 10/13/2016     Malignant neoplasm of right female breast, unspecified site of breast (H) 10/13/2016     Resolved Ambulatory Problems     Diagnosis Date Noted     iamJOINT PAIN-LOWER LEG 12/07/2006     Past Medical History:   Diagnosis Date     Osteopenia      Sinus problem        Physical Exam    Constitutional: She is well-developed, well-nourished, and in no distress.   HENT:   Nose: Nose normal.   Mouth/Throat: Oropharynx is clear and moist. No oropharyngeal exudate.   Mild pharyngeal erythema, no exudate     Eyes: Conjunctivae are normal. Pupils are equal, round, and reactive to light. Right eye exhibits no discharge. Left eye exhibits no discharge. No scleral icterus.   Neck: Normal range of motion. Neck supple.   Cardiovascular: Normal rate, regular rhythm and normal heart sounds.  Exam reveals no gallop and no friction rub.    No murmur heard.  Pulmonary/Chest: Effort normal and breath sounds normal. No respiratory distress. She has no wheezes. She has no rales. She exhibits tenderness (mid sternal tenderness).   She does not cough during the visit.   Musculoskeletal: She exhibits no edema.   Lymphadenopathy:     She has no cervical adenopathy.   Neurological: She is alert.   Skin: Skin is warm and dry. She is not diaphoretic.   Vitals reviewed.        /75 (BP Location: Left arm, Patient Position: Chair, Cuff Size: Adult Small)  Pulse 95  Temp 98.1  F (36.7  C) (Oral)  Resp 18  Wt 135 lb (61.2 kg)  LMP 08/28/2013  SpO2 99%  BMI 21.79 kg/m2    A/P  Bronchitis: because of the new shortness of breath and chest tightness, I am getting a CXR today, though her resp exam is clear and her vitals do not suggest pneumonia.  PE is unlikely, no hypoxia, tachycardia, or calf swelling.  Discussed that she could consider prednisone, but given all the side effects, would leave this as a last option.  She does have an albuterol inhaler at home from another illness; could try this to see if it alleviates tightness.  I do not suspect ACS given the atypical nature of her pain and lack of other symptoms.  GERD or anxiety might be other factors to consider.  I think her sore throat is likely related to the cough or part of the viral syndrome; reviewed negative strep culture.  She has some sternal tenderness,  so costochondritis may be part of this and she is advised to try some ibuprofen if needed.    I will f/u on the CXR.

## 2017-08-21 ENCOUNTER — OFFICE VISIT (OUTPATIENT)
Dept: FAMILY MEDICINE | Facility: CLINIC | Age: 57
End: 2017-08-21
Payer: COMMERCIAL

## 2017-08-21 VITALS
BODY MASS INDEX: 22.27 KG/M2 | SYSTOLIC BLOOD PRESSURE: 106 MMHG | DIASTOLIC BLOOD PRESSURE: 63 MMHG | WEIGHT: 138 LBS | OXYGEN SATURATION: 100 % | HEART RATE: 67 BPM | TEMPERATURE: 98 F

## 2017-08-21 DIAGNOSIS — R07.0 THROAT PAIN: ICD-10-CM

## 2017-08-21 DIAGNOSIS — J02.9 VIRAL PHARYNGITIS: Primary | ICD-10-CM

## 2017-08-21 LAB
DEPRECATED S PYO AG THROAT QL EIA: NORMAL
SPECIMEN SOURCE: NORMAL

## 2017-08-21 PROCEDURE — 87880 STREP A ASSAY W/OPTIC: CPT | Performed by: INTERNAL MEDICINE

## 2017-08-21 PROCEDURE — 87081 CULTURE SCREEN ONLY: CPT | Performed by: INTERNAL MEDICINE

## 2017-08-21 PROCEDURE — 99213 OFFICE O/P EST LOW 20 MIN: CPT | Performed by: INTERNAL MEDICINE

## 2017-08-21 NOTE — NURSING NOTE
"Chief Complaint   Patient presents with     Fatigue     Pt in clinic to have eval for sore throat, fatigue, and headache.      Pharyngitis     Headache       Initial /63  Pulse 67  Temp 98  F (36.7  C) (Oral)  Wt 62.6 kg (138 lb)  LMP 08/28/2013  SpO2 100%  BMI 22.27 kg/m2 Estimated body mass index is 22.27 kg/(m^2) as calculated from the following:    Height as of 4/12/17: 1.676 m (5' 6\").    Weight as of this encounter: 62.6 kg (138 lb).  Medication Reconciliation: complete   Celeste Gilbert/ MA    "

## 2017-08-21 NOTE — PATIENT INSTRUCTIONS
Rapid Strep A Screen 08/21/2017  4:21 PM 84   NEGATIVE: No Group A streptococcal antigen      Fluids  Salt water gargle    Call or return to clinic if symptoms worsen or fail to improve as anticipated.

## 2017-08-21 NOTE — PROGRESS NOTES
SUBJECTIVE:   Shelia Avendaño is a 56 year old female presenting with a chief complaint of   Chief Complaint   Patient presents with     Fatigue     Pt in clinic to have eval for sore throat, fatigue, and headache.      Pharyngitis     Headache     .  Onset of symptoms was 1 week(s) ago. - fatigue  2 days of sore throat     Current and Associated symptoms: sore throat and fatigue  Treatment measures tried include tylenol.  Predisposing factors include HX of Strep.  Friend passed away.    Past Medical History:   Diagnosis Date     Osteopenia      Sinus problem      Current Outpatient Prescriptions   Medication Sig Dispense Refill     TAMOXIFEN CITRATE PO        TRAZODONE HCL PO        albuterol (PROAIR HFA/PROVENTIL HFA/VENTOLIN HFA) 108 (90 BASE) MCG/ACT Inhaler Inhale 2 puffs into the lungs every 4 hours as needed for shortness of breath / dyspnea or wheezing (use with a spacer) 1 Inhaler 0     benzonatate (TESSALON) 200 MG capsule Take 1 capsule (200 mg) by mouth 3 times daily as needed for cough 30 capsule 0     tolterodine (DETROL LA) 4 MG 24 hr capsule Take 1 capsule (4 mg) by mouth daily 90 capsule 1     tolterodine (DETROL LA) 4 MG 24 hr capsule Take 4 mg by mouth daily Reported on 4/12/2017       calcium-magnesium (CALMAG) 500-250 MG TABS Take 1 tablet by mouth daily       ACYCLOVIR PO Take 100 mg by mouth daily        amitriptyline (ELAVIL) 10 MG tablet Take  by mouth At Bedtime.       Levothyroxine Sodium (SYNTHROID PO) Take 188 mcg by mouth daily . 1 mg daily       Liothyronine Sodium (CYTOMEL PO) Take  by mouth. 1/2 tab daily       azithromycin (ZITHROMAX) 250 MG tablet Two tablets first day, then one tablet daily for four days. (Patient not taking: Reported on 8/21/2017) 6 tablet 0     Social History   Substance Use Topics     Smoking status: Never Smoker     Smokeless tobacco: Never Used     Alcohol use No       ROS:  CONSTITUTIONAL:NEGATIVE  for chills and fever     ENT/MOUTH: no runny nose or sore  throat or ear ache  Had 3 days of sneezes after colonoscopy few weeks ago.    RESP:NEGATIVE for significant cough or SOB    OBJECTIVE  :/63  Pulse 67  Temp 98  F (36.7  C) (Oral)  Wt 138 lb (62.6 kg)  LMP 08/28/2013  SpO2 100%  BMI 22.27 kg/m2  GENERAL APPEARANCE: healthy, alert and no distress  HENT: ear canals and TM's normal.  Nose and mouth without ulcers, erythema or lesions  HENT: white PND  NECK: supple, nontender, no lymphadenopathy  RESP: lungs clear to auscultation - no rales, rhonchi or wheezes  CV: regular rates and rhythm, normal S1 S2, no murmur noted  ABDOMEN:  soft, nontender, no HSM or masses and bowel sounds normal    ASSESSMENT:    ICD-10-CM    1. Viral pharyngitis J02.9    2. Throat pain R07.0 Strep, Rapid Screen     Beta strep group A culture         PLAN:  Patient Instructions     Rapid Strep A Screen 08/21/2017  4:21 PM 84   NEGATIVE: No Group A streptococcal antigen      Fluids  Salt water gargle    Call or return to clinic if symptoms worsen or fail to improve as anticipated.

## 2017-08-21 NOTE — MR AVS SNAPSHOT
After Visit Summary   8/21/2017    Shelia Avendaño    MRN: [id number]           Patient Information     Date Of Birth          1960        Visit Information        Provider Department      8/21/2017 4:15 PM Barb Fajardo MD [facility]        Today's Diagnoses     Viral pharyngitis    -  1    Throat pain          Care Instructions    Rapid Strep A Screen 08/21/2017  4:21 PM 84   NEGATIVE: No Group A streptococcal antigen      Fluids  Salt water gargle    Call or return to clinic if symptoms worsen or fail to improve as anticipated.            Follow-ups after your visit        Who to contact     If you have questions or need follow up information about today's clinic visit or your schedule please contact [facility] directly at [phone].  Normal or non-critical lab and imaging results will be communicated to you by MyChart, letter or phone within 4 business days after the clinic has received the results. If you do not hear from us within 7 days, please contact the clinic through MyChart or phone. If you have a critical or abnormal lab result, we will notify you by phone as soon as possible.  Submit refill requests through [employer] or call your pharmacy and they will forward the refill request to us. Please allow 3 business days for your refill to be completed.          Additional Information About Your Visit        MyChart Information     [employer] gives you secure access to your electronic health record. If you see a primary care provider, you can also send messages to your care team and make appointments. If you have questions, please call your primary care clinic.  If you do not have a primary care provider, please call [phone] and they will assist you.        Care EveryWhere ID     This is your Care EveryWhere ID. This could be used by other organizations to access your [place] medical records  [id number]        Your Vitals Were     Pulse  Temperature Last Period Pulse Oximetry BMI (Body Mass Index)       67 98  F (36.7  C) (Oral) 08/28/2013 100% 22.27 kg/m2        Blood Pressure from Last 3 Encounters:   08/21/17 106/63   04/17/17 105/75   04/12/17 104/66    Weight from Last 3 Encounters:   08/21/17 138 lb (62.6 kg)   04/17/17 135 lb (61.2 kg)   04/12/17 138 lb (62.6 kg)              We Performed the Following     Beta strep group A culture     Strep, Rapid Screen        Primary Care Provider Office Phone # Fax #    Billy Huynh Mk Laboy [phone] [address]  [facility] [address]        Equal Access to Services     RAMÓN SANTOS : Hadii jonathan rutledgeo Somargarita, waaxda luqadaha, qaybta kaalmada adeegyada, karl he . So [facility] [phone].    ATENCIÓN: Si habla español, tiene a johnston disposición servicios gratuitos de asistencia lingüística. Llame al [phone].    We comply with applicable federal civil rights laws and [place] laws. We do not discriminate on the basis of race, color, national origin, age, disability sex, sexual orientation or gender identity.            Thank you!     Thank you for choosing [facility]  for your care. Our goal is always to provide you with excellent care. Hearing back from our patients is one way we can continue to improve our services. Please take a few minutes to complete the written survey that you may receive in the mail after your visit with us. Thank you!             Your Updated Medication List - Protect others around you: Learn how to safely use, store and throw away your medicines at www.disposemymeds.org.          This list is accurate as of: 8/21/17  4:39 PM.  Always use your most recent med list.                   Brand Name Dispense Instructions for use Diagnosis    ACYCLOVIR PO      Take 100 mg by mouth daily        albuterol 108 (90 BASE) MCG/ACT Inhaler    PROAIR HFA/PROVENTIL HFA/VENTOLIN HFA    1 Inhaler    Inhale 2  puffs into the lungs every 4 hours as needed for shortness of breath / dyspnea or wheezing (use with a spacer)    Acute bronchitis due to other specified organisms, Acute bronchospasm       amitriptyline 10 MG tablet    ELAVIL     Take  by mouth At Bedtime.        azithromycin 250 MG tablet    ZITHROMAX    6 tablet    Two tablets first day, then one tablet daily for four days.    Acute bronchitis with symptoms > 10 days       benzonatate 200 MG capsule    TESSALON    30 capsule    Take 1 capsule (200 mg) by mouth 3 times daily as needed for cough    Acute bronchitis due to other specified organisms       calcium-magnesium 500-250 MG Tabs per tablet    CALMAG     Take 1 tablet by mouth daily        CYTOMEL PO      Take  by mouth. 1/2 tab daily        * DETROL LA 4 MG 24 hr capsule   Generic drug:  tolterodine      Take 4 mg by mouth daily Reported on 4/12/2017        * tolterodine 4 MG 24 hr capsule    DETROL LA    90 capsule    Take 1 capsule (4 mg) by mouth daily    Overactive bladder       SYNTHROID PO      Take 188 mcg by mouth daily . 1 mg daily        TAMOXIFEN CITRATE PO           TRAZODONE HCL PO           * Notice:  This list has 2 medication(s) that are the same as other medications prescribed for you. Read the directions carefully, and ask your doctor or other care provider to review them with you.

## 2017-08-21 NOTE — Clinical Note
Please abstract the following data from this visit with this patient into the appropriate field in Epic:  Mammogram done on this date: 9/2016 (approximately), by this group: [facility], results were Abnormal. Please see pre-op notes. Celeste Gilbert/ MA

## 2017-08-22 LAB
BACTERIA SPEC CULT: NORMAL
SPECIMEN SOURCE: NORMAL

## 2017-09-01 ENCOUNTER — OFFICE VISIT (OUTPATIENT)
Dept: FAMILY MEDICINE | Facility: CLINIC | Age: 57
End: 2017-09-01
Payer: COMMERCIAL

## 2017-09-01 ENCOUNTER — NURSE TRIAGE (OUTPATIENT)
Dept: NURSING | Facility: CLINIC | Age: 57
End: 2017-09-01

## 2017-09-01 VITALS
TEMPERATURE: 98 F | DIASTOLIC BLOOD PRESSURE: 69 MMHG | RESPIRATION RATE: 18 BRPM | BODY MASS INDEX: 22.44 KG/M2 | SYSTOLIC BLOOD PRESSURE: 99 MMHG | OXYGEN SATURATION: 100 % | WEIGHT: 139 LBS | HEART RATE: 91 BPM

## 2017-09-01 DIAGNOSIS — R20.9 DISTURBANCE OF SKIN SENSATION: Primary | ICD-10-CM

## 2017-09-01 DIAGNOSIS — R07.89 CHEST WALL PAIN: ICD-10-CM

## 2017-09-01 LAB — D DIMER PPP FEU-MCNC: 0.2 UG/ML FEU (ref 0–0.5)

## 2017-09-01 PROCEDURE — 99213 OFFICE O/P EST LOW 20 MIN: CPT | Performed by: FAMILY MEDICINE

## 2017-09-01 PROCEDURE — 36415 COLL VENOUS BLD VENIPUNCTURE: CPT | Performed by: FAMILY MEDICINE

## 2017-09-01 PROCEDURE — 85379 FIBRIN DEGRADATION QUANT: CPT | Performed by: FAMILY MEDICINE

## 2017-09-01 NOTE — PROGRESS NOTES
SUBJECTIVE:   Shelia Avendaño is a 57 year old female who presents to clinic today for the following health issues:      Musculoskeletal problem/pain      Duration: 3 weeks    Description  Location: left leg, tingling in left arm    Intensity:  mild, moderate    Accompanying signs and symptoms: tingling    History  Previous similar problem: no   Previous evaluation:  none    Precipitating or alleviating factors:  Trauma or overuse: no   Aggravating factors include: none    Therapies tried and outcome: nothing    Primarily thigh and left inner leg and shin.    May well be stress related. Started the day after a friend  of pancreatic cancer.    Initially it was a sensitivity of the left thigh.    She was sitting standing that day.    ROS  No rash    Did experience an extreme pain 7/10 after moving to her left side in bed last night. Lasted about 15 minutes.    Feels OK at this point.    EXAM:  BP 99/69  Pulse 91  Temp 98  F (36.7  C) (Oral)  Resp 18  Wt 139 lb (63 kg)  LMP 2013  SpO2 100%  BMI 22.44 kg/m2  Constitutional: Healthy, alert, no distress   Cardiovascular: RRR. No murmurs   Respiratory: Clear to auscultation   Musculoskeletal: Minimal tenderness with palpation of left anterior chest wall.  Skin: sensation to light touch intact in thighs medial and lateral. good ROM of both upper and lower extremities right and left.     ASSESSMENT    ICD-10-CM    1. Disturbance of skin sensation R20.9    2. Chest wall pain R07.89 D dimer, quantitative      Plan:  Patient Instructions   I believe the burning sensation in your legs relates to pressure on the nerves and I expect that yoga will help strengthen the core and offload such pressure. Gentle yoga. Meditative aspects of this may help you process the stress you recently went through.    Given the chest wall pain you felt last night I feel it will be helpful to check a d-dimer as we discussed. It is likely to help reassure us that the pain you  experienced was musculoskeletal which it sounds.     Billy Laboy MD  Family Medicine Physician

## 2017-09-01 NOTE — MR AVS SNAPSHOT
After Visit Summary   9/1/2017    Shelia Avendaño    MRN: [id number]           Patient Information     Date Of Birth          1960        Visit Information        Provider Department      9/1/2017 3:30 PM Billy Yousif MD [facility]        Today's Diagnoses     Chest wall pain    -  1      Care Instructions    I believe the burning sensation in your legs relates to pressure on the nerves and I expect that yoga will help strengthen the core and offload such pressure. Gentle yoga. Meditative aspects of this may help you process the stress you recently went through.    Given the chest wall pain you felt last night I feel it will be helpful to check a d-dimer as we discussed. It is likely to help reassure us that the pain you experienced was musculoskeletal which it sounds.          Follow-ups after your visit        Who to contact     If you have questions or need follow up information about today's clinic visit or your schedule please contact [facility] directly at [phone].  Normal or non-critical lab and imaging results will be communicated to you by [employer], letter or phone within 4 business days after the clinic has received the results. If you do not hear from us within 7 days, please contact the clinic through [employer] or phone. If you have a critical or abnormal lab result, we will notify you by phone as soon as possible.  Submit refill requests through [employer] or call your pharmacy and they will forward the refill request to us. Please allow 3 business days for your refill to be completed.          Additional Information About Your Visit        K[employer] Information     [employer] gives you secure access to your electronic health record. If you see a primary care provider, you can also send messages to your care team and make appointments. If you have questions, please call your primary care clinic.  If you do not have a primary care  provider, please call [phone] and they will assist you.        Care EveryWhere ID     This is your Care EveryWhere ID. This could be used by other organizations to access your [place] medical records  [id number]        Your Vitals Were     Pulse Temperature Respirations Last Period Pulse Oximetry BMI (Body Mass Index)    91 98  F (36.7  C) (Oral) 18 08/28/2013 100% 22.44 kg/m2       Blood Pressure from Last 3 Encounters:   09/01/17 99/69   08/21/17 106/63   04/17/17 105/75    Weight from Last 3 Encounters:   09/01/17 139 lb (63 kg)   08/21/17 138 lb (62.6 kg)   04/17/17 135 lb (61.2 kg)              We Performed the Following     D dimer, quantitative        Primary Care Provider Office Phone # Fax #    Billy Huynh Mk Laboy [phone]       [address]  [facility] [address]        Equal Access to Services     RAMÓN SANTOS : Hadii aad ku hadasho Soomaali, waaxda luqadaha, qaybta kaalmada adeegyada, waxay idiin hayaan marcieg kharaazalea he . So [facility] [phone].    ATENCIÓN: Si habla español, tiene a johnston disposición servicios gratuitos de asistencia lingüística. Llame al [phone].    We comply with applicable federal civil rights laws and [place] laws. We do not discriminate on the basis of race, color, national origin, age, disability sex, sexual orientation or gender identity.            Thank you!     Thank you for choosing [facility]  for your care. Our goal is always to provide you with excellent care. Hearing back from our patients is one way we can continue to improve our services. Please take a few minutes to complete the written survey that you may receive in the mail after your visit with us. Thank you!             Your Updated Medication List - Protect others around you: Learn how to safely use, store and throw away your medicines at www.disposemymeds.org.          This list is accurate as of: 9/1/17  4:12 PM.  Always use your most recent med list.                    Brand Name Dispense Instructions for use Diagnosis    ACYCLOVIR PO      Take 100 mg by mouth daily        amitriptyline 10 MG tablet    ELAVIL     Take  by mouth At Bedtime.        calcium-magnesium 500-250 MG Tabs per tablet    CALMAG     Take 1 tablet by mouth daily        CYTOMEL PO      Take  by mouth. 1/2 tab daily        SYNTHROID PO      Take 188 mcg by mouth daily . 1 mg daily        TAMOXIFEN CITRATE PO           TRAZODONE HCL PO      Take 25 mg by mouth

## 2017-09-02 NOTE — TELEPHONE ENCOUNTER
Additional Information    Negative: [1] Caller is not with the adult (patient) AND [2] reporting urgent symptoms    Negative: Lab result questions    Negative: Medication questions    Negative: Caller cannot be reached by phone    Negative: Caller has already spoken to PCP or another triager    Negative: RN needs further essential information from caller in order to complete triage    Negative: Requesting regular office appointment    Negative: [1] Caller requesting NON-URGENT health information AND [2] PCP's office is the best resource    [1] Follow-up call to recent contact AND [2] information only call, no triage required    Negative: Health Information question, no triage required and triager able to answer question    Negative: General information question, no triage required and triager able to answer question    Negative: Question about upcoming scheduled test, no triage required and triager able to answer question    Negative: [1] Caller is not with the adult (patient) AND [2] probable NON-URGENT symptoms    Protocols used: [facility]    Pt. Calls and wants to know her test result, from today. Pt. Says that the  Was supposed to let her know the result, but she has not heard from the Dr. RN then checked EPIC and saw that pt. Had a D-Dimer test done this elyse. Because the result has not been reviewed, by the , RN could not tell pt. The result. RN told pt. This and pt. Says that she will call her clinic, when it opens, for the test result. Pt. Had no further questions.

## 2017-09-02 NOTE — TELEPHONE ENCOUNTER
----- Message from Stevan Mueller sent at 9/1/2017  7:45 PM CDT -----  Calling in regards to test results.please advise

## 2017-12-18 ENCOUNTER — OFFICE VISIT (OUTPATIENT)
Dept: FAMILY MEDICINE | Facility: CLINIC | Age: 57
End: 2017-12-18
Payer: COMMERCIAL

## 2017-12-18 VITALS
TEMPERATURE: 98.3 F | HEART RATE: 108 BPM | WEIGHT: 136 LBS | RESPIRATION RATE: 18 BRPM | SYSTOLIC BLOOD PRESSURE: 102 MMHG | BODY MASS INDEX: 21.95 KG/M2 | DIASTOLIC BLOOD PRESSURE: 72 MMHG

## 2017-12-18 DIAGNOSIS — Z11.59 ENCOUNTER FOR HCV SCREENING TEST FOR LOW RISK PATIENT: ICD-10-CM

## 2017-12-18 DIAGNOSIS — C50.911 MALIGNANT NEOPLASM OF RIGHT FEMALE BREAST, UNSPECIFIED ESTROGEN RECEPTOR STATUS, UNSPECIFIED SITE OF BREAST (H): ICD-10-CM

## 2017-12-18 DIAGNOSIS — Z13.220 LIPID SCREENING: ICD-10-CM

## 2017-12-18 DIAGNOSIS — Z01.818 PREOP GENERAL PHYSICAL EXAM: Primary | ICD-10-CM

## 2017-12-18 DIAGNOSIS — Z13.1 SCREENING FOR DIABETES MELLITUS: ICD-10-CM

## 2017-12-18 LAB
ERYTHROCYTE [DISTWIDTH] IN BLOOD BY AUTOMATED COUNT: 11.5 % (ref 10–15)
HBA1C MFR BLD: 5.3 % (ref 4.3–6)
HCT VFR BLD AUTO: 38.7 % (ref 35–47)
HGB BLD-MCNC: 13 G/DL (ref 11.7–15.7)
MCH RBC QN AUTO: 31.7 PG (ref 26.5–33)
MCHC RBC AUTO-ENTMCNC: 33.6 G/DL (ref 31.5–36.5)
MCV RBC AUTO: 94 FL (ref 78–100)
PLATELET # BLD AUTO: 262 10E9/L (ref 150–450)
RBC # BLD AUTO: 4.1 10E12/L (ref 3.8–5.2)
WBC # BLD AUTO: 5.3 10E9/L (ref 4–11)

## 2017-12-18 PROCEDURE — 80048 BASIC METABOLIC PNL TOTAL CA: CPT | Performed by: FAMILY MEDICINE

## 2017-12-18 PROCEDURE — 80061 LIPID PANEL: CPT | Performed by: FAMILY MEDICINE

## 2017-12-18 PROCEDURE — 36415 COLL VENOUS BLD VENIPUNCTURE: CPT | Performed by: FAMILY MEDICINE

## 2017-12-18 PROCEDURE — 99214 OFFICE O/P EST MOD 30 MIN: CPT | Performed by: FAMILY MEDICINE

## 2017-12-18 PROCEDURE — 83036 HEMOGLOBIN GLYCOSYLATED A1C: CPT | Performed by: FAMILY MEDICINE

## 2017-12-18 PROCEDURE — 85027 COMPLETE CBC AUTOMATED: CPT | Performed by: FAMILY MEDICINE

## 2017-12-18 ASSESSMENT — ENCOUNTER SYMPTOMS
PALPITATIONS: 0
EYE PAIN: 1
SHORTNESS OF BREATH: 0
WEIGHT LOSS: 0
MYALGIAS: 0
DOUBLE VISION: 0
CONSTIPATION: 0
NERVOUS/ANXIOUS: 0
BLURRED VISION: 0
CHILLS: 0
VOMITING: 0
FEVER: 0
DYSURIA: 0
NAUSEA: 0
DIARRHEA: 1
HEADACHES: 0
DEPRESSION: 0

## 2017-12-18 NOTE — MR AVS SNAPSHOT
After Visit Summary   12/18/2017    Shelia Avendaño    MRN: [id number]           Patient Information     Date Of Birth          1960        Visit Information        Provider Department      12/18/2017 1:40 PM Carmel Gibson [facility]        Today's Diagnoses     Preop general physical exam    -  1    Malignant neoplasm of right female breast, unspecified estrogen receptor status, unspecified site of breast (H)        Lipid screening        Screening for diabetes mellitus        Encounter for HCV screening test for low risk patient          Care Instructions      Before Your Surgery      Call your surgeon if there is any change in your health. This includes signs of a cold or flu (such as a sore throat, runny nose, cough, rash or fever).    Do not smoke, drink alcohol or take over the counter medicine (unless your surgeon or primary care doctor tells you to) for the 24 hours before and after surgery.    If you take prescribed drugs: Follow your doctor s orders about which medicines to take and which to stop until after surgery.    Eating and drinking prior to surgery: follow the instructions from your surgeon    Take a shower or bath the night before surgery. Use the soap your surgeon gave you to gently clean your skin. If you do not have soap from your surgeon, use your regular soap. Do not shave or scrub the surgery site.  Wear clean pajamas and have clean sheets on your bed.           Follow-ups after your visit        Who to contact     If you have questions or need follow up information about today's clinic visit or your schedule please contact [facility] directly at [phone].  Normal or non-critical lab and imaging results will be communicated to you by MyChart, letter or phone within 4 business days after the clinic has received the results. If you do not hear from us within 7 days, please contact the clinic through MyChart or phone. If you  have a critical or abnormal lab result, we will notify you by phone as soon as possible.  Submit refill requests through [employer] or call your pharmacy and they will forward the refill request to us. Please allow 3 business days for your refill to be completed.          Additional Information About Your Visit        [employer] Information     [employer] gives you secure access to your electronic health record. If you see a primary care provider, you can also send messages to your care team and make appointments. If you have questions, please call your primary care clinic.  If you do not have a primary care provider, please call [phone] and they will assist you.        Care EveryWhere ID     This is your Care EveryWhere ID. This could be used by other organizations to access your [place] medical records  [id number]        Your Vitals Were     Pulse Temperature Respirations Last Period BMI (Body Mass Index)       108 98.3  F (36.8  C) (Tympanic) 18 08/28/2013 21.95 kg/m2        Blood Pressure from Last 3 Encounters:   12/18/17 102/72   09/01/17 99/69   08/21/17 106/63    Weight from Last 3 Encounters:   12/18/17 136 lb (61.7 kg)   09/01/17 139 lb (63 kg)   08/21/17 138 lb (62.6 kg)              We Performed the Following     Basic metabolic panel     CBC with platelets     Hemoglobin A1c     Lipid Profile        Primary Care Provider Office Phone # Fax #    Billy Huynh Mk Laboy [phone]       [address]  [facility] [address]        Equal Access to Services     RAMÓN SANTOS : Hadii aad ku hadasho Soomaali, waaxda luqadaha, qaybta kaalmada adeegyada, karl he . So [facility] [phone].    ATENCIÓN: Si habla español, tiene a johnston disposición servicios gratuitos de asistencia lingüística. Llame al [phone].    We comply with applicable federal civil rights laws and [place] laws. We do not discriminate on the basis of race, color, national origin, age,  disability, sex, sexual orientation, or gender identity.            Thank you!     Thank you for choosing [facility]  for your care. Our goal is always to provide you with excellent care. Hearing back from our patients is one way we can continue to improve our services. Please take a few minutes to complete the written survey that you may receive in the mail after your visit with us. Thank you!             Your Updated Medication List - Protect others around you: Learn how to safely use, store and throw away your medicines at www.disposemymeds.org.          This list is accurate as of: 12/18/17 11:59 PM.  Always use your most recent med list.                   Brand Name Dispense Instructions for use Diagnosis    ACYCLOVIR PO      Take 100 mg by mouth daily        amitriptyline 10 MG tablet    ELAVIL     Take  by mouth At Bedtime.        calcium-magnesium 500-250 MG Tabs per tablet    CALMAG     Take 1 tablet by mouth daily        CYTOMEL PO      Take  by mouth. 1/2 tab daily        SYNTHROID PO      Take 188 mcg by mouth daily . 1 mg daily        TAMOXIFEN CITRATE PO           TRAZODONE HCL PO      Take 25 mg by mouth

## 2017-12-18 NOTE — PROGRESS NOTES
[address] [address]  [phone]  Dept: [phone]    PRE-OP EVALUATION:  Today's date: 2017    Shelia Avendaño (: 1960) presents for pre-operative evaluation assessment as requested by Dr. Stevan Morris.  She requires evaluation and anesthesia risk assessment prior to undergoing surgery/procedure for treatment of reconstructive surgery after lumpectomy and radiation .  Proposed procedure: reconstructive surgery after lumpectomy and radiation    Date of Surgery/ Procedure: 2017  [facility]  Hospital/Surgical Facility: [facility]   Fax number for surgical facility: [phone]  Primary Physician: Billy Yousif  Type of Anesthesia Anticipated: General    Patient has a Health Care Directive or Living Will:  NO    1. NO - Do you have a history of heart attack, stroke, stent, bypass or surgery on an artery in the head, neck, heart or legs?  2. NO - Do you ever have any pain or discomfort in your chest?  3. NO - Do you have a history of  Heart Failure?  4. NO - Are you troubled by shortness of breath when: walking on the level, up a slight hill or at night?  5. NO - Do you currently have a cold, bronchitis or other respiratory infection?  6. NO - Do you have a cough, shortness of breath or wheezing?  7. NO - Do you sometimes get pains in the calves of your legs when you walk?  8. NO - Do you or anyone in your family have previous history of blood clots?  9. NO - Do you or does anyone in your family have a serious bleeding problem such as prolonged bleeding following surgeries or cuts?  10. YES - HAVE YOU EVER HAD PROBLEMS WITH ANEMIA OR BEEN TOLD TO TAKE IRON PILLS? 20 years ago  11. NO - Have you had any abnormal blood loss such as black, tarry or bloody stools, or abnormal vaginal bleeding?  12. NO - Have you ever had a blood transfusion?  13. NO - Have you or any of your relatives ever had  problems with anesthesia?  14. NO - Do you have sleep apnea, excessive snoring or daytime drowsiness?  15. NO - Do you have any prosthetic heart valves?  16. NO - Do you have prosthetic joints?  17. NO - Is there any chance that you may be pregnant?    HPI:                                                      Brief HPI related to upcoming procedure: Shelia Avendaño is a post-menopausal 57 year old female with a history of grade I invasive ductal carcinoma of the right breast now s/p lumpectomy 10/19/2016 and chemotherapy w/ tamoxifen. She is in her usual state of health and has upcoming right breast reconstruction scheduled for 12/28.     I have reviewed her past medical history from 10/12/16 as the following:      -Osteopenia--currently taking calcium supplements. Her last Dexa was 2016, c/w osteopenia worse than 2009     - Overactive bladder: she has symptoms infrequently, so only takes detrol LA as needed.  Her symptoms include urinary urge and dysuria at the end of urination (different from UTI), takes detrol for about 2 weeks out of the year.      - Hypothyroid due to radiation tx for hyperthyroidism--[place] [facility] [place]     - HSV on suppressive therapy, not currently active.      - Arthritis in her hands, which she associates with her chronic Lyme's.     - Chronic sleep disorder since lyme's disease, taking amitriptyline       - Postmenopausal     MEDICAL HISTORY:                                                    Patient Active Problem List    Diagnosis Date Noted     Postablative hypothyroidism 10/13/2016     Priority: Medium     H/o hyperthyroidism       Malignant neoplasm of right female breast, unspecified site of breast 10/13/2016     Priority: Medium     Osteopenia      Priority: Medium     Dysphonia 05/18/2015     Priority: Medium     Closed fracture of one or more phalanges of foot 01/19/2012     Priority: Medium      Past Medical History:   Diagnosis Date     Osteopenia      Sinus  problem      Past Surgical History:   Procedure Laterality Date     TONSILLECTOMY       Current Outpatient Prescriptions   Medication Sig Dispense Refill     TAMOXIFEN CITRATE PO        TRAZODONE HCL PO Take 25 mg by mouth        calcium-magnesium (CALMAG) 500-250 MG TABS Take 1 tablet by mouth daily       ACYCLOVIR PO Take 100 mg by mouth daily        amitriptyline (ELAVIL) 10 MG tablet Take  by mouth At Bedtime.       Levothyroxine Sodium (SYNTHROID PO) Take 188 mcg by mouth daily . 1 mg daily       Liothyronine Sodium (CYTOMEL PO) Take  by mouth. 1/2 tab daily       No Known Allergies   Latex Allergy: NO    Social History   Substance Use Topics     Smoking status: Never Smoker     Smokeless tobacco: Never Used     Alcohol use 2-3 alc beverages per week     History   Drug Use No       REVIEW OF SYSTEMS:                                                    Review of Systems   Constitutional: Negative for chills, fever and weight loss.   HENT: Negative for ear pain.    Eyes: Positive for pain (occasional). Negative for blurred vision and double vision.   Respiratory: Negative for shortness of breath.    Cardiovascular: Negative for chest pain, palpitations and leg swelling.   Gastrointestinal: Positive for diarrhea (occasional loose stools). Negative for constipation, nausea and vomiting.   Genitourinary: Negative for dysuria.   Musculoskeletal: Negative for myalgias.   Skin: Negative for rash.   Neurological: Negative for headaches.   Psychiatric/Behavioral: Negative for depression. The patient is not nervous/anxious.      EXAM:                                                    LMP 08/28/2013  Vitals: /72 (BP Location: Right arm, Patient Position: Sitting, Cuff Size: Adult Regular)  Pulse 108  Temp 98.3  F (36.8  C) (Tympanic)  Resp 18  Wt 136 lb (61.7 kg)  [facility] 08/28/2013  BMI 21.95 kg/m2  BMI= Body mass index is 21.95 kg/(m^2).     Physical Exam   Constitutional: She is well-developed, well-nourished,  and in no distress. No distress.   HENT:   Head: Normocephalic and atraumatic.   Right Ear: Tympanic membrane, external ear and ear canal normal.   Left Ear: Tympanic membrane, external ear and ear canal normal.   Eyes: Conjunctivae are normal.   Neck: Normal range of motion. No thyromegaly present.   Cardiovascular: Normal rate and regular rhythm.    No murmur heard.  Pulmonary/Chest: Effort normal. She has no wheezes.   Abdominal: Soft. There is no tenderness.   Musculoskeletal: Normal range of motion. She exhibits no edema.   Neurological: She is alert.   Skin: Skin is warm and dry.   Psychiatric: Affect normal.   Nursing note and vitals reviewed.    DIAGNOSTICS:                                                    EKG reviewed (most recent 10/12/2016): appears normal, NSR, normal axis, normal intervals, no acute ST/T changes c/w ischemia, no LVH by voltage criteria, no prior tracings for comparison.    Labs obtained today: BMP, CBC, A1C, Lipid profile    IMPRESSION:                                                    Reason for surgery/procedure: s/p lumpectomy  Diagnosis/reason for consult: pre-operative evaluation     The proposed surgical procedure is considered LOW risk.     REVISED CARDIAC RISK INDEX  The patient has the following serious cardiovascular risks for perioperative complications such as (MI, PE, VFib and 3  AV Block):  No serious cardiac risks  INTERPRETATION: 0 risks: Class I (very low risk - 0.4% complication rate)     The patient has the following additional risks for perioperative complications:  No identified additional risks    No diagnosis found.    RECOMMENDATIONS:                                                    - Approval given to proceed with proposed procedure, without further diagnostic evaluation         Signed Electronically by: Carmel Gibson MD    Copy of this evaluation report is provided to requesting physician.    [facility] Preop Guidelines

## 2017-12-19 LAB
ANION GAP SERPL CALCULATED.3IONS-SCNC: 12 MMOL/L (ref 3–14)
BUN SERPL-MCNC: 17 MG/DL (ref 7–30)
CALCIUM SERPL-MCNC: 9 MG/DL (ref 8.5–10.1)
CHLORIDE SERPL-SCNC: 108 MMOL/L (ref 94–109)
CHOLEST SERPL-MCNC: 177 MG/DL
CO2 SERPL-SCNC: 22 MMOL/L (ref 20–32)
CREAT SERPL-MCNC: 0.64 MG/DL (ref 0.52–1.04)
GFR SERPL CREATININE-BSD FRML MDRD: >90 ML/MIN/1.7M2
GLUCOSE SERPL-MCNC: 94 MG/DL (ref 70–99)
HDLC SERPL-MCNC: 69 MG/DL
LDLC SERPL CALC-MCNC: 88 MG/DL
NONHDLC SERPL-MCNC: 108 MG/DL
POTASSIUM SERPL-SCNC: 3.8 MMOL/L (ref 3.4–5.3)
SODIUM SERPL-SCNC: 142 MMOL/L (ref 133–144)
TRIGL SERPL-MCNC: 98 MG/DL

## 2017-12-21 ENCOUNTER — OFFICE VISIT (OUTPATIENT)
Dept: FAMILY MEDICINE | Facility: CLINIC | Age: 57
End: 2017-12-21
Payer: COMMERCIAL

## 2017-12-21 VITALS
SYSTOLIC BLOOD PRESSURE: 110 MMHG | HEART RATE: 92 BPM | TEMPERATURE: 97.9 F | OXYGEN SATURATION: 96 % | BODY MASS INDEX: 22.27 KG/M2 | WEIGHT: 138 LBS | DIASTOLIC BLOOD PRESSURE: 73 MMHG | RESPIRATION RATE: 18 BRPM

## 2017-12-21 DIAGNOSIS — J06.9 VIRAL URI WITH COUGH: ICD-10-CM

## 2017-12-21 DIAGNOSIS — R07.0 THROAT PAIN: Primary | ICD-10-CM

## 2017-12-21 PROBLEM — Z85.3 PERSONAL HISTORY OF BREAST CANCER: Status: ACTIVE | Noted: 2017-01-31

## 2017-12-21 PROBLEM — B00.9 HSV INFECTION: Status: ACTIVE | Noted: 2017-11-08

## 2017-12-21 PROBLEM — R87.611 PAP SMEAR OF CERVIX WITH ASCUS, CANNOT EXCLUDE HGSIL: Status: ACTIVE | Noted: 2017-12-21

## 2017-12-21 PROBLEM — Z91.89 DES EXPOSURE IN UTERO: Status: ACTIVE | Noted: 2017-01-31

## 2017-12-21 LAB
DEPRECATED S PYO AG THROAT QL EIA: NORMAL
SPECIMEN SOURCE: NORMAL

## 2017-12-21 PROCEDURE — 87880 STREP A ASSAY W/OPTIC: CPT | Performed by: FAMILY MEDICINE

## 2017-12-21 PROCEDURE — 99214 OFFICE O/P EST MOD 30 MIN: CPT | Performed by: FAMILY MEDICINE

## 2017-12-21 PROCEDURE — 87081 CULTURE SCREEN ONLY: CPT | Performed by: FAMILY MEDICINE

## 2017-12-21 NOTE — MR AVS SNAPSHOT
After Visit Summary   2017    Shelia Avendaño    MRN: [id number]           Patient Information     Date Of Birth          1960        Visit Information        Provider Department      2017 4:40 PM Any Hamm MD [facility] [place]        Today's Diagnoses     Throat pain    -  1    Viral URI with cough          Care Instructions    flonase 1 spray each nostril daily 2 weeks  Zyrtec 10 mg daily 2 week  Call surgeon about illness  If not better in 5 days can get an antibiotic.   mucinex 600 mg twice a day 1 week  Humidifier in room may help  supportive care as below  Go to the Er if worse  Follow up with primary if symptoms persist     Your symptoms and exam today indicate that you have a viral upper respiratory illness.  This includes viral rhinosinusitis and viral bronchitis.  Antibiotics do not help viral illnesses; the best remedies treat the symptoms (see below).  The typical course of a viral illness is that you feel rather miserable for the first few days - with sore throat, runny nose/nasal congestion, cough, and sometimes fever and body aches.  You should start to feel better after about 5-7 days and much better by 10-14 days.  If you develop sudden worsening of symptoms or fever after the first 5-7 days, or if you have persistence of your symptoms beyond 14 days, let us know as you may have developed a secondary bacterial infection.      For symptom relief I suggest tryin. Steam.  Take a long, hot shower.  Or if you don't want to get in the shower just run it with the bathroom door shut for a few minutes and breathe the steam.  2. Drink hot liquids frequently such as tea or hot water with honey and lemon.  3. Acetaminophen (Tylenol) and ibuprofen (Motrin or Advil) as needed for headache, sore throat, body aches, or fever.  4. For loosening phlegm and sputum try guaifenesin (available in many combination products and alone as plain Robitussin or plain Mucinex) and  for cough suppression you can try dextromethorphan (Delsym or combined in other products).  5. For nasal congestion try:    An oral decongestant.  The only decongestant I recommend is pseudoephedrine. Ask the pharmacist for the over the counter (but real) pseudoephedrine - not phenylephrine.  This can raise your blood pressure and heart rate so do not use this if you have hypertension.       Afrin spray for 3 days.  (Never use afrin nasal spray for more than 3 days as there is a risk of developing tolerance and rebound/worsening nasal congestion if used longer than this.)    Nealmed sinus rinses.    Nasal steroid spray such as nasacort or flonase, which are over-the-counter.  6. And most importantly: plenty of rest and sleep  especially while you have a fever.     Stop smoking and avoid secondhand smoke    Drink lots of fluids such as water and clear soups. Fluids help loosen mucus. Fluids are also important because they help prevent dehydration.    Gargle with warm salt water a few times a day to relieve a sore throat. Throat sprays or lozenges may also help relieve the pain.    Avoid alcohol.    Use saline (salt water) nose drops to help loosen mucus and moisten the tender skin in your nose.  Encouraged mucinex, warm salt water gargles, cepacol spray, soothers/lozenges, sinus rinses (neilmed), flonase (2 sprays per nostril daily x 2 weeks), vitamin c, fluids and rest.  May alternate tylenol and NSAIDS (ibuprofen, advil, aleve type products) every 4-6 hours for the next few days as needed.   No need for oral antibiotic at this time.            Follow-ups after your visit        Your next 10 appointments already scheduled     Dec 21, 2017  4:40 PM CST   Office Visit with Any Hamm MD   [facility]    [address] [place] [id number] [phone]           Bring a current list of meds and any records pertaining to this visit. For Physicals, please bring  immunization records and any forms needing to be filled out. Please arrive 10 minutes early to complete paperwork.              Who to contact     If you have questions or need follow up information about today's clinic visit or your schedule please contact [facility] directly at [phone].  Normal or non-critical lab and imaging results will be communicated to you by MyChart, letter or phone within 4 business days after the clinic has received the results. If you do not hear from us within 7 days, please contact the clinic through [employer] or phone. If you have a critical or abnormal lab result, we will notify you by phone as soon as possible.  Submit refill requests through [employer] or call your pharmacy and they will forward the refill request to us. Please allow 3 business days for your refill to be completed.          Additional Information About Your Visit        MyChart Information     [employer] gives you secure access to your electronic health record. If you see a primary care provider, you can also send messages to your care team and make appointments. If you have questions, please call your primary care clinic.  If you do not have a primary care provider, please call [phone] and they will assist you.        Care EveryWhere ID     This is your Care EveryWhere ID. This could be used by other organizations to access your [place] medical records  [id number]        Your Vitals Were     Pulse Temperature Respirations Last Period Pulse Oximetry BMI (Body Mass Index)    92 97.9  F (36.6  C) (Oral) 18 08/28/2013 96% 22.27 kg/m2       Blood Pressure from Last 3 Encounters:   12/21/17 110/73   12/18/17 102/72   09/01/17 99/69    Weight from Last 3 Encounters:   12/21/17 138 lb (62.6 kg)   12/18/17 136 lb (61.7 kg)   09/01/17 139 lb (63 kg)              We Performed the Following     Beta strep group A culture     Strep, Rapid Screen        Primary Care Provider Office Phone # Fax #    [facility]  Lino Laboy [phone]       [address]  [facility] [address]        Equal Access to Services     RAMÓN SANTOS : Hadii jonathan delong kenny Spence, wakalenda yelitza, rocíota karickeyda doe, karl yanez laCarlosherbie patel. So [facility] [phone].    ATENCIÓN: Si habla español, tiene a johnston disposición servicios gratuitos de asistencia lingüística. Llame al [phone].    We comply with applicable federal civil rights laws and [place] laws. We do not discriminate on the basis of race, color, national origin, age, disability, sex, sexual orientation, or gender identity.            Thank you!     Thank you for choosing [facility]  for your care. Our goal is always to provide you with excellent care. Hearing back from our patients is one way we can continue to improve our services. Please take a few minutes to complete the written survey that you may receive in the mail after your visit with us. Thank you!             Your Updated Medication List - Protect others around you: Learn how to safely use, store and throw away your medicines at www.disposemymeds.org.          This list is accurate as of: 12/21/17  4:39 PM.  Always use your most recent med list.                   Brand Name Dispense Instructions for use Diagnosis    ACYCLOVIR PO      Take 100 mg by mouth daily        amitriptyline 10 MG tablet    ELAVIL     Take  by mouth At Bedtime.        calcium-magnesium 500-250 MG Tabs per tablet    CALMAG     Take 1 tablet by mouth daily        CYTOMEL PO      Take  by mouth. 1/2 tab daily        SYNTHROID PO      Take 188 mcg by mouth daily . 1 mg daily        TAMOXIFEN CITRATE PO           TRAZODONE HCL PO      Take 25 mg by mouth

## 2017-12-21 NOTE — PROGRESS NOTES
SUBJECTIVE:   Shelia Avendaño is a 57 year old female who presents to clinic today for the following health issues:    RESPIRATORY SYMPTOMS      Duration: 3 days    Description  rhinorrhea, sore throat, facial pain/pressure, cough, ear pain left, headache and fatigue/malaise ; sinus drainage     Severity: moderate    Accompanying signs and symptoms: chest tightness and tickle in the throat     History (predisposing factors):  None strep in the past    Precipitating or alleviating factors: None    Therapies tried and outcome:  rest and fluids OTC for cold     First symptom chest felt tight, icky sick feeling chest feels full, then tickle in throat , ? Sinus drainage into throat and stomach  and today bad sore throat. Feeling fatigued. Coughing some, not a lot 3 to 4 times and hr. Had bronchitis twice last spring. Some left ear pain. Feeling pain in lower jaw and headache. No fever or chills, no  dizziness, no trouble hearing, smelling, tasting or swallowing, no chest pain, trouble breathing , but feels cant get a full breath, no palpitations, No heart burn, reflux, stomach weird from swallowing phlegm. No nausea or vomiting or diarrhea or constipation, no blood in stools or black stools, no weight loss or night sweats. No urinary complaints. No pelvic complaints. No leg swelling or rash. Or joint pain.     Due for reconstructive breast surgery under [place] 12/28 that's why came in to be checked out.    Problem list and histories reviewed & adjusted, as indicated.  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     Backache     Recurrent genital HSV (herpes simplex virus) infection     S/P radioactive iodine thyroid ablation     Past Surgical History:   Procedure Laterality Date     TONSILLECTOMY         Social History   Substance Use Topics     Smoking status: Never Smoker     Smokeless tobacco: Never Used     Alcohol use No     Family History   Problem Relation Age of Onset     Prostate Cancer Father 55     Thyroid Disease Mother          No Known Allergies  Recent Labs   Lab Test  12/18/17   1415  03/01/17   1721  10/12/16   1005   A1C  5.3   --    --    LDL  88   --   140*   HDL  69   --   69   TRIG  98   --   45   ALT   --   25   --    CR  0.64  0.76  0.81   GFRESTIMATED  >90  79  73   GFRESTBLACK  >90  >90  African American GFR Calc    89   POTASSIUM  3.8  3.9  3.9      BP Readings from Last 3 Encounters:   12/21/17 110/73   12/18/17 102/72   09/01/17 99/69    Wt Readings from Last 3 Encounters:   12/21/17 138 lb (62.6 kg)   12/18/17 136 lb (61.7 kg)   09/01/17 139 lb (63 kg)                  Labs reviewed in EPIC          Reviewed and updated as needed this visit by clinical staffTobacco  Allergies  Meds  Med Hx  Surg Hx  Fam Hx  Soc Hx      Reviewed and updated as needed this visit by Provider         ROS:  Constitutional, HEENT, cardiovascular, pulmonary, GI, , musculoskeletal, neuro, skin, endocrine and psych systems are negative, except as otherwise noted.      OBJECTIVE:   /73 (BP Location: Left arm, Patient Position: Chair, Cuff Size: Adult Regular)  Pulse 92  Temp 97.9  F (36.6  C) (Oral)  Resp 18  Wt 138 lb (62.6 kg)  LMP 08/28/2013  SpO2 96%  BMI 22.27 kg/m2  Body mass index is 22.27 kg/(m^2).  GENERAL: healthy, alert and no distress  EYES: Eyes grossly normal to inspection, PERRL and conjunctivae and sclerae normal  HENT: normal cephalic/atraumatic, ear canals and TM's normal, nose and mouth without ulcers  or lesions, nasal mucosa edematous , rhinorrhea clear, oropharynx clear, oral mucous membranes moist, sinuses: not tender, and has a rare cough  NECK: no adenopathy, no asymmetry, masses, or scars and thyroid normal to palpation  RESP: lungs clear to auscultation - no rales, rhonchi or wheezes  CV: regular rate and rhythm, normal S1 S2, no S3 or S4, no murmur, click or rub, no peripheral edema and peripheral pulses strong  ABDOMEN: soft, non tender, no hepatosplenomegaly, no masses and bowel sounds normal  MS: no gross musculoskeletal defects noted, no edema  SKIN: no suspicious lesions or rashes  NEURO: Normal strength and tone, mentation intact and speech normal  PSYCH: mentation appears normal, affect normal/bright    Diagnostic Test Results:  No results found for this or any previous visit (from the past 24 hour(s)).    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 Hx of closed  fracture toe Her last Dexa was 2016, C/w osteopenia worse than 2009, back pain, Overactive bladder: she has symptoms infrequently, so only takes Dietrol LA as needed.  Her symptoms include urinary urge and dysuria at the end of urination (different from UTI), takes Dietrol for about 2 weeks out of the year. Hypothyroid due to radioactive iodine ablation [place] for graves/ hyperthyroidism, on levothyroxine & liothyronine--followed by a provider in the [facility] system. Recurrent genital HSV on suppressive therapy with acyclovir, not currently active. Arthritis in her hands, which she associates with her chronic Lyme's. 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 Throat pain  Due to postnasal drip . Try Flonase 1  spray each nostril daily 2 weeks. Zyrtec 10 mg daily 2 week. Call surgeon about illness. If not better in 5 days can get an antibiotic. Mucinex 600 mg twice a day 1 week  Humidifier in room may help. supportive care as below. Go to the Er if worse. Follow up with primary if symptoms persist   - Strep, Rapid Screen  - Beta strep group A culture    2. Viral URI with cough  For symptom relief I suggest tryin. Steam.  Take a long, hot shower.  Or if you don't want to get in the shower just run it with the bathroom door shut for a few minutes and breathe the steam.  2. Drink hot liquids frequently such as tea or hot water with honey and lemon.  3. Acetaminophen (Tylenol) and ibuprofen (Motrin or Advil) as needed for headache, sore throat, body aches, or fever.  4. For loosening phlegm and sputum try guaifenesin (available in many combination products and alone as plain Robitussin or plain Mucinex) and for cough suppression you can try dextromethorphan (Delsym or combined in other products).  5. For nasal congestion try:    An oral decongestant.  The only decongestant I recommend is pseudoephedrine. Ask the pharmacist for the over the counter (but real) pseudoephedrine - not phenylephrine.  This can raise your blood pressure and heart rate so do not use this if you have hypertension.       Afrin spray for 3 days.  (Never use afrin nasal spray for more than 3 days as there is a risk of developing tolerance and rebound/worsening nasal congestion if used longer than this.)    Nealmed sinus rinses.    Nasal steroid spray such as Nasacort or Flonase, which are over-the-counter.  6. And most importantly: plenty of rest and sleep  especially while you have a fever.     Stop smoking and avoid secondhand smoke    Drink lots of fluids such as water and clear soups. Fluids help loosen mucus. Fluids are also important because they help prevent dehydration.    Gargle with warm salt water a few times a day to relieve a sore  throat. Throat sprays or lozenges may also help relieve the pain.    Avoid alcohol.    Use saline (salt water) nose drops to help loosen mucus and moisten the tender skin in your nose.  Encouraged Mucinex, warm salt water gargles, Cepacol spray, soothers/lozenges, sinus rinses (neilmed), Flonase (2 sprays per nostril daily x 2 weeks), vitamin C, fluids and rest.  May alternate tylenol and NSAID'S (ibuprofen, Advil, aleve type products) every 4-6 hours for the next few days as needed.   No need for oral antibiotic at this time.      See Patient Instructions    Any Hamm MD  [facility] [place]

## 2017-12-21 NOTE — PATIENT INSTRUCTIONS
flonase 1 spray each nostril daily 2 weeks  Zyrtec 10 mg daily 2 week  Call surgeon about illness  If not better in 5 days can get an antibiotic.   mucinex 600 mg twice a day 1 week  Humidifier in room may help  supportive care as below  Go to the Er if worse  Follow up with primary if symptoms persist     Your symptoms and exam today indicate that you have a viral upper respiratory illness.  This includes viral rhinosinusitis and viral bronchitis.  Antibiotics do not help viral illnesses; the best remedies treat the symptoms (see below).  The typical course of a viral illness is that you feel rather miserable for the first few days - with sore throat, runny nose/nasal congestion, cough, and sometimes fever and body aches.  You should start to feel better after about 5-7 days and much better by 10-14 days.  If you develop sudden worsening of symptoms or fever after the first 5-7 days, or if you have persistence of your symptoms beyond 14 days, let us know as you may have developed a secondary bacterial infection.      For symptom relief I suggest tryin. Steam.  Take a long, hot shower.  Or if you don't want to get in the shower just run it with the bathroom door shut for a few minutes and breathe the steam.  2. Drink hot liquids frequently such as tea or hot water with honey and lemon.  3. Acetaminophen (Tylenol) and ibuprofen (Motrin or Advil) as needed for headache, sore throat, body aches, or fever.  4. For loosening phlegm and sputum try guaifenesin (available in many combination products and alone as plain Robitussin or plain Mucinex) and for cough suppression you can try dextromethorphan (Delsym or combined in other products).  5. For nasal congestion try:    An oral decongestant.  The only decongestant I recommend is pseudoephedrine. Ask the pharmacist for the over the counter (but real) pseudoephedrine - not phenylephrine.  This can raise your blood pressure and heart rate so do not use this if you have  hypertension.       Afrin spray for 3 days.  (Never use afrin nasal spray for more than 3 days as there is a risk of developing tolerance and rebound/worsening nasal congestion if used longer than this.)    Nealmed sinus rinses.    Nasal steroid spray such as nasacort or flonase, which are over-the-counter.  6. And most importantly: plenty of rest and sleep  especially while you have a fever.     Stop smoking and avoid secondhand smoke    Drink lots of fluids such as water and clear soups. Fluids help loosen mucus. Fluids are also important because they help prevent dehydration.    Gargle with warm salt water a few times a day to relieve a sore throat. Throat sprays or lozenges may also help relieve the pain.    Avoid alcohol.    Use saline (salt water) nose drops to help loosen mucus and moisten the tender skin in your nose.  Encouraged mucinex, warm salt water gargles, cepacol spray, soothers/lozenges, sinus rinses (neilmed), flonase (2 sprays per nostril daily x 2 weeks), vitamin c, fluids and rest.  May alternate tylenol and NSAIDS (ibuprofen, advil, aleve type products) every 4-6 hours for the next few days as needed.   No need for oral antibiotic at this time.

## 2017-12-22 LAB
BACTERIA SPEC CULT: NORMAL
SPECIMEN SOURCE: NORMAL

## 2017-12-22 NOTE — PROGRESS NOTES
Nolan Ms. Avendaño,  Your results came back so fra negative for strep on culture. If you have any further concerns please do not hesitate to contact us by message, phone or making an appointment.  Have a good day   Dr Hansel RAMOS

## 2017-12-27 ENCOUNTER — TELEPHONE (OUTPATIENT)
Dept: FAMILY MEDICINE | Facility: CLINIC | Age: 57
End: 2017-12-27

## 2017-12-27 NOTE — TELEPHONE ENCOUNTER
Spoke to Ken. She fazed this preop last week. Called pt at the # listed and informed her of this. Thanks!

## 2017-12-27 NOTE — TELEPHONE ENCOUNTER
Reason for Call:  Other-Faxed PreOp    Detailed comments: Pt called to confirmed if her preop exam was faxed to [facility] #[phone]. I told her I was unsure if it was faxed, but I could help do that now incase it wasn't done. Pt states she has her surgery tomorrow so she will like that taken care of. Please contact pt for any questions. Thanks!    Phone Number Patient can be reached at: Cell number on file:    Telephone Information:   Mobile [phone]       Best Time: ANytime    Can we leave a detailed message on this number? YES    Call taken on 12/27/2017 at 1:20 PM by Jasmina Pruitt

## 2018-02-01 ENCOUNTER — OFFICE VISIT (OUTPATIENT)
Dept: FAMILY MEDICINE | Facility: CLINIC | Age: 58
End: 2018-02-01
Payer: COMMERCIAL

## 2018-02-01 VITALS
RESPIRATION RATE: 18 BRPM | WEIGHT: 143.13 LBS | HEART RATE: 107 BPM | SYSTOLIC BLOOD PRESSURE: 113 MMHG | OXYGEN SATURATION: 95 % | DIASTOLIC BLOOD PRESSURE: 77 MMHG | BODY MASS INDEX: 23.1 KG/M2 | TEMPERATURE: 98 F

## 2018-02-01 DIAGNOSIS — R05.9 COUGH: ICD-10-CM

## 2018-02-01 DIAGNOSIS — J20.9 ACUTE BRONCHITIS WITH SYMPTOMS > 10 DAYS: ICD-10-CM

## 2018-02-01 DIAGNOSIS — J06.9 VIRAL URI WITH COUGH: Primary | ICD-10-CM

## 2018-02-01 PROCEDURE — 99213 OFFICE O/P EST LOW 20 MIN: CPT | Performed by: INTERNAL MEDICINE

## 2018-02-01 RX ORDER — CODEINE PHOSPHATE AND GUAIFENESIN 10; 100 MG/5ML; MG/5ML
1 SOLUTION ORAL EVERY 4 HOURS PRN
Qty: 120 ML | Refills: 0 | Status: SHIPPED | OUTPATIENT
Start: 2018-02-01 | End: 2018-02-23

## 2018-02-01 RX ORDER — FLUTICASONE PROPIONATE 50 MCG
1-2 SPRAY, SUSPENSION (ML) NASAL DAILY
Qty: 1 BOTTLE | Refills: 0 | Status: SHIPPED | OUTPATIENT
Start: 2018-02-01 | End: 2019-03-04

## 2018-02-01 RX ORDER — AZITHROMYCIN 250 MG/1
TABLET, FILM COATED ORAL
Qty: 6 TABLET | Refills: 0 | Status: SHIPPED | OUTPATIENT
Start: 2018-02-01 | End: 2018-02-23

## 2018-02-01 ASSESSMENT — ENCOUNTER SYMPTOMS
SINUS PAIN: 0
WHEEZING: 0
FEVER: 0
CHILLS: 0
SHORTNESS OF BREATH: 0
COUGH: 1
SORE THROAT: 1

## 2018-02-01 NOTE — PROGRESS NOTES
SUBJECTIVE:   Shelia Avendaño is a 57 year old female presenting with a chief complaint of   Chief Complaint   Patient presents with     Cough     cough and fatigue x 2 weeks    .    Onset of symptoms was 2 week(s) ago.  Course of illness is worsening.      Current and Associated symptoms: cough - non-productive and fits with poor sleep  PND & wet cough  Treatment measures tried include OTC Cough med, Vaporizer and Fluids.  Predisposing factors include None.    , coworkers and clients are concerned about her ongoing cough    Review of Systems   Constitutional: Positive for malaise/fatigue. Negative for chills and fever.   HENT: Positive for sore throat. Negative for ear pain and sinus pain.    Respiratory: Positive for cough. Negative for shortness of breath and wheezing.          Past Medical History:   Diagnosis Date     Osteopenia      Sinus problem      Current Outpatient Prescriptions   Medication Sig Dispense Refill     fluticasone (FLONASE) 50 MCG/ACT spray Spray 1-2 sprays into both nostrils daily 1 Bottle 0     azithromycin (ZITHROMAX) 250 MG tablet Two tablets first day, then one tablet daily for four days. 6 tablet 0     guaiFENesin-codeine (ROBITUSSIN AC) 100-10 MG/5ML SOLN solution Take 5 mLs by mouth every 4 hours as needed for cough 120 mL 0     TAMOXIFEN CITRATE PO        TRAZODONE HCL PO Take 25 mg by mouth        calcium-magnesium (CALMAG) 500-250 MG TABS Take 1 tablet by mouth daily       ACYCLOVIR PO Take 100 mg by mouth daily        amitriptyline (ELAVIL) 10 MG tablet Take  by mouth At Bedtime.       Levothyroxine Sodium (SYNTHROID PO) Take 188 mcg by mouth daily . 1 mg daily       Liothyronine Sodium (CYTOMEL PO) Take  by mouth. 1/2 tab daily       Social History   Substance Use Topics     Smoking status: Never Smoker     Smokeless tobacco: Never Used     Alcohol use No       OBJECTIVE  /77 (BP Location: Left arm, Patient Position: Sitting, Cuff Size: Adult Regular)  Pulse 107   Temp 98  F (36.7  C) (Oral)  Resp 18  Wt 143 lb 2 oz (64.9 kg)  LMP 08/28/2013 (LMP Unknown)  SpO2 95%  Breastfeeding? No  BMI 23.1 kg/m2    Physical Exam   Constitutional: She is well-developed, well-nourished, and in no distress.   HENT:   Nose: Nose normal.   Mouth/Throat: No oropharyngeal exudate.   tympanic membrane clear  PND   Cardiovascular: Normal rate, regular rhythm, normal heart sounds and intact distal pulses.    Pulmonary/Chest: Effort normal and breath sounds normal.   Lymphadenopathy:     She has no cervical adenopathy.       Labs:  No results found for this or any previous visit (from the past 24 hour(s)).        ASSESSMENT:      ICD-10-CM    1. Viral URI with cough J06.9 fluticasone (FLONASE) 50 MCG/ACT spray    B97.89 azithromycin (ZITHROMAX) 250 MG tablet     guaiFENesin-codeine (ROBITUSSIN AC) 100-10 MG/5ML SOLN solution   2. Cough R05    3. Acute bronchitis with symptoms > 10 days J20.9         Medical Decision Making:  Discussed with patient most likely she has a remaining viral URI with a cough.  She seemed resistant to try Flonase and Robitussin with codeine with other routine care such as saline gargles.  Also suggested trying the inhaler versus antibiotics.  Patient felt she most likely need antibiotics & symptoms going greater than 2 weeks a Z-Nacho was given      Serious Comorbid Conditions:  None    PLAN:  Patient Instructions   flonase  Robitussin codeine  Menthol cough drops  vicks  zpak  Saline gargling    Call or return to clinic if symptoms worsen or fail to improve as anticipated.

## 2018-02-01 NOTE — MR AVS SNAPSHOT
After Visit Summary   2/1/2018    Shelia Avendaño    MRN: [id number]           Patient Information     Date Of Birth          1960        Visit Information        Provider Department      2/1/2018 3:45 PM Barb Fajardo MD [facility]        Today's Diagnoses     Viral URI with cough    -  1    Cough        Acute bronchitis with symptoms > 10 days          Care Instructions    flonase  Robitussin codeine  Menthol cough drops  vicks  zpak  Saline gargling    Call or return to clinic if symptoms worsen or fail to improve as anticipated.            Follow-ups after your visit        Who to contact     If you have questions or need follow up information about today's clinic visit or your schedule please contact [facility] directly at [phone].  Normal or non-critical lab and imaging results will be communicated to you by MyChart, letter or phone within 4 business days after the clinic has received the results. If you do not hear from us within 7 days, please contact the clinic through MyChart or phone. If you have a critical or abnormal lab result, we will notify you by phone as soon as possible.  Submit refill requests through [employer] or call your pharmacy and they will forward the refill request to us. Please allow 3 business days for your refill to be completed.          Additional Information About Your Visit        MyChart Information     [employer] gives you secure access to your electronic health record. If you see a primary care provider, you can also send messages to your care team and make appointments. If you have questions, please call your primary care clinic.  If you do not have a primary care provider, please call [phone] and they will assist you.        Care EveryWhere ID     This is your Care EveryWhere ID. This could be used by other organizations to access your [place] medical records  [id number]        Your Vitals Were      Pulse Temperature Respirations Last Period Pulse Oximetry Breastfeeding?    107 98  F (36.7  C) (Oral) 18 08/28/2013 (LMP Unknown) 95% No    BMI (Body Mass Index)                   23.1 kg/m2            Blood Pressure from Last 3 Encounters:   02/01/18 113/77   12/21/17 110/73   12/18/17 102/72    Weight from Last 3 Encounters:   02/01/18 143 lb 2 oz (64.9 kg)   12/21/17 138 lb (62.6 kg)   12/18/17 136 lb (61.7 kg)              Today, you had the following     No orders found for display         Today's Medication Changes          These changes are accurate as of 2/1/18  4:13 PM.  If you have any questions, ask your nurse or doctor.               Start taking these medicines.        Dose/Directions    azithromycin 250 MG tablet   Commonly known as:  ZITHROMAX   Used for:  Viral URI with cough   Started by:  Barb Fajardo MD        Two tablets first day, then one tablet daily for four days.   Quantity:  6 tablet   Refills:  0       fluticasone 50 MCG/ACT spray   Commonly known as:  FLONASE   Used for:  Viral URI with cough   Started by:  Barb Fajardo MD        Dose:  1-2 spray   Spray 1-2 sprays into both nostrils daily   Quantity:  1 Bottle   Refills:  0       guaiFENesin-codeine 100-10 MG/5ML Soln solution   Commonly known as:  ROBITUSSIN AC   Used for:  Viral URI with cough   Started by:  Barb Fajardo MD        Dose:  1 tsp.   Take 5 mLs by mouth every 4 hours as needed for cough   Quantity:  120 mL   Refills:  0            Where to get your medicines      These medications were sent to [facility] [id number] [place] [address] [address] [place] [address] AT [address] [address]     Phone:  [phone]     azithromycin 250 MG tablet    fluticasone 50 MCG/ACT spray         Some of these will need a paper prescription and others can be bought over the counter.  Ask your nurse if you have questions.     Bring a paper prescription for  each of these medications     guaiFENesin-codeine 100-10 MG/5ML Soln solution                Primary Care Provider Office Phone # Fax #    Billy Huynh Mk Laboy [phone] [address]  [facility] [address]        Equal Access to Services     RAMÓN SANTOS : Hadii aad ku hadasho Soomaali, waaxda luqadaha, qaybta kaalmada adeegyada, waxay idiin hayaan adeeg khjuanis laaramn ah. So [facility] [phone].    ATENCIÓN: Si habla español, tiene a johnston disposición servicios gratuitos de asistencia lingüística. [facility] [phone].    We comply with applicable federal civil rights laws and [place] laws. We do not discriminate on the basis of race, color, national origin, age, disability, sex, sexual orientation, or gender identity.            Thank you!     Thank you for choosing [facility]  for your care. Our goal is always to provide you with excellent care. Hearing back from our patients is one way we can continue to improve our services. Please take a few minutes to complete the written survey that you may receive in the mail after your visit with us. Thank you!             Your Updated Medication List - Protect others around you: Learn how to safely use, store and throw away your medicines at www.disposemymeds.org.          This list is accurate as of 2/1/18  4:13 PM.  Always use your most recent med list.                   Brand Name Dispense Instructions for use Diagnosis    ACYCLOVIR PO      Take 100 mg by mouth daily        amitriptyline 10 MG tablet    ELAVIL     Take  by mouth At Bedtime.        azithromycin 250 MG tablet    ZITHROMAX    6 tablet    Two tablets first day, then one tablet daily for four days.    Viral URI with cough       calcium-magnesium 500-250 MG Tabs per tablet    CALMAG     Take 1 tablet by mouth daily        CYTOMEL PO      Take  by mouth. 1/2 tab daily        fluticasone 50 MCG/ACT spray    FLONASE    1 Bottle    Spray 1-2 sprays into both  nostrils daily    Viral URI with cough       guaiFENesin-codeine 100-10 MG/5ML Soln solution    ROBITUSSIN AC    120 mL    Take 5 mLs by mouth every 4 hours as needed for cough    Viral URI with cough       SYNTHROID PO      Take 188 mcg by mouth daily . 1 mg daily        TAMOXIFEN CITRATE PO           TRAZODONE HCL PO      Take 25 mg by mouth

## 2018-02-01 NOTE — PATIENT INSTRUCTIONS
flonase  Robitussin codeine  Menthol cough drops  vicks  zpak  Saline gargling    Call or return to clinic if symptoms worsen or fail to improve as anticipated.

## 2018-02-06 ENCOUNTER — OFFICE VISIT (OUTPATIENT)
Dept: FAMILY MEDICINE | Facility: CLINIC | Age: 58
End: 2018-02-06
Payer: COMMERCIAL

## 2018-02-06 VITALS
OXYGEN SATURATION: 95 % | TEMPERATURE: 99.1 F | WEIGHT: 145 LBS | SYSTOLIC BLOOD PRESSURE: 114 MMHG | BODY MASS INDEX: 23.4 KG/M2 | RESPIRATION RATE: 16 BRPM | DIASTOLIC BLOOD PRESSURE: 65 MMHG | HEART RATE: 88 BPM

## 2018-02-06 DIAGNOSIS — Z85.3 PERSONAL HISTORY OF BREAST CANCER: ICD-10-CM

## 2018-02-06 DIAGNOSIS — J20.9 ACUTE BRONCHITIS WITH SYMPTOMS > 10 DAYS: Primary | ICD-10-CM

## 2018-02-06 DIAGNOSIS — R53.83 FATIGUE, UNSPECIFIED TYPE: ICD-10-CM

## 2018-02-06 PROBLEM — B00.9 HSV INFECTION: Status: RESOLVED | Noted: 2017-11-08 | Resolved: 2018-02-06

## 2018-02-06 PROCEDURE — 99214 OFFICE O/P EST MOD 30 MIN: CPT | Performed by: FAMILY MEDICINE

## 2018-02-06 RX ORDER — ALBUTEROL SULFATE 90 UG/1
2 AEROSOL, METERED RESPIRATORY (INHALATION) EVERY 6 HOURS PRN
Qty: 1 INHALER | Refills: 0 | Status: SHIPPED | OUTPATIENT
Start: 2018-02-06 | End: 2018-02-23

## 2018-02-06 RX ORDER — BENZONATATE 100 MG/1
100 CAPSULE ORAL 3 TIMES DAILY PRN
Qty: 42 CAPSULE | Refills: 0 | Status: SHIPPED | OUTPATIENT
Start: 2018-02-06 | End: 2018-02-13

## 2018-02-06 NOTE — PATIENT INSTRUCTIONS
Continue flonase 1 spray each nose daily   Complete zpak  Use robitussin with codeine at night   Currently dont suspect pneumonia  zpak would cover whooping cough / atypical infection/ walking pneumonia anyway   Albuterol 2 puffs every 6 hrs for cough. If cough not better call about prednisone  If worse come back for cxr   If getting better monitor and supportive cares as below    flonase 1 spray each nostril daily 2 weeks  Zyrtec 10 mg daily 2 week  mucinex 600 mg twice a day 1 week  Humidifier in room may help  supportive care as below  Go to the Er if worse  Follow up with primary if symptoms persist     Your symptoms and exam today indicate that you have a viral upper respiratory illness.  This includes viral rhinosinusitis and viral bronchitis.  Antibiotics do not help viral illnesses; the best remedies treat the symptoms (see below).  The typical course of a viral illness is that you feel rather miserable for the first few days - with sore throat, runny nose/nasal congestion, cough, and sometimes fever and body aches.  You should start to feel better after about 5-7 days and much better by 10-14 days.  If you develop sudden worsening of symptoms or fever after the first 5-7 days, or if you have persistence of your symptoms beyond 14 days, let us know as you may have developed a secondary bacterial infection.      For symptom relief I suggest tryin. Steam.  Take a long, hot shower.  Or if you don't want to get in the shower just run it with the bathroom door shut for a few minutes and breathe the steam.  2. Drink hot liquids frequently such as tea or hot water with honey and lemon.  3. Acetaminophen (Tylenol) and ibuprofen (Motrin or Advil) as needed for headache, sore throat, body aches, or fever.  4. For loosening phlegm and sputum try guaifenesin (available in many combination products and alone as plain Robitussin or plain Mucinex) and for cough suppression you can try dextromethorphan (Delsym or  combined in other products).  5. For nasal congestion try:    An oral decongestant.  The only decongestant I recommend is pseudoephedrine. Ask the pharmacist for the over the counter (but real) pseudoephedrine - not phenylephrine.  This can raise your blood pressure and heart rate so do not use this if you have hypertension.       Afrin spray for 3 days.  (Never use afrin nasal spray for more than 3 days as there is a risk of developing tolerance and rebound/worsening nasal congestion if used longer than this.)    Nealmed sinus rinses.    Nasal steroid spray such as nasacort or flonase, which are over-the-counter.  6. And most importantly: plenty of rest and sleep  especially while you have a fever.     Stop smoking and avoid secondhand smoke    Drink lots of fluids such as water and clear soups. Fluids help loosen mucus. Fluids are also important because they help prevent dehydration.    Gargle with warm salt water a few times a day to relieve a sore throat. Throat sprays or lozenges may also help relieve the pain.    Avoid alcohol.    Use saline (salt water) nose drops to help loosen mucus and moisten the tender skin in your nose.  Encouraged mucinex, warm salt water gargles, cepacol spray, soothers/lozenges, sinus rinses (neilmed), flonase (2 sprays per nostril daily x 2 weeks), vitamin c, fluids and rest.  May alternate tylenol and NSAIDS (ibuprofen, advil, aleve type products) every 4-6 hours for the next few days as needed.   No need for oral antibiotic at this time.

## 2018-02-06 NOTE — MR AVS SNAPSHOT
After Visit Summary   2018    Shelia Avendaño    MRN: [id number]           Patient Information     Date Of Birth          1960        Visit Information        Provider Department      2018 2:20 PM Any Hamm MD [facility]        Today's Diagnoses     Acute bronchitis with symptoms > 10 days    -  1    Fatigue, unspecified type        Personal history of breast cancer          Care Instructions    Continue flonase 1 spray each nose daily   Complete zpak  Use robitussin with codeine at night   Currently dont suspect pneumonia  zpak would cover whooping cough / atypical infection/ walking pneumonia anyway   Albuterol 2 puffs every 6 hrs for cough. If cough not better call about prednisone  If worse come back for cxr   If getting better monitor and supportive cares as below    flonase 1 spray each nostril daily 2 weeks  Zyrtec 10 mg daily 2 week  mucinex 600 mg twice a day 1 week  Humidifier in room may help  supportive care as below  Go to the Er if worse  Follow up with primary if symptoms persist     Your symptoms and exam today indicate that you have a viral upper respiratory illness.  This includes viral rhinosinusitis and viral bronchitis.  Antibiotics do not help viral illnesses; the best remedies treat the symptoms (see below).  The typical course of a viral illness is that you feel rather miserable for the first few days - with sore throat, runny nose/nasal congestion, cough, and sometimes fever and body aches.  You should start to feel better after about 5-7 days and much better by 10-14 days.  If you develop sudden worsening of symptoms or fever after the first 5-7 days, or if you have persistence of your symptoms beyond 14 days, let us know as you may have developed a secondary bacterial infection.      For symptom relief I suggest tryin. Steam.  Take a long, hot shower.  Or if you don't want to get in the shower just run it with the bathroom door shut for a  few minutes and breathe the steam.  2. Drink hot liquids frequently such as tea or hot water with honey and lemon.  3. Acetaminophen (Tylenol) and ibuprofen (Motrin or Advil) as needed for headache, sore throat, body aches, or fever.  4. For loosening phlegm and sputum try guaifenesin (available in many combination products and alone as plain Robitussin or plain Mucinex) and for cough suppression you can try dextromethorphan (Delsym or combined in other products).  5. For nasal congestion try:    An oral decongestant.  The only decongestant I recommend is pseudoephedrine. Ask the pharmacist for the over the counter (but real) pseudoephedrine - not phenylephrine.  This can raise your blood pressure and heart rate so do not use this if you have hypertension.       Afrin spray for 3 days.  (Never use afrin nasal spray for more than 3 days as there is a risk of developing tolerance and rebound/worsening nasal congestion if used longer than this.)    Nealmed sinus rinses.    Nasal steroid spray such as nasacort or flonase, which are over-the-counter.  6. And most importantly: plenty of rest and sleep  especially while you have a fever.     Stop smoking and avoid secondhand smoke    Drink lots of fluids such as water and clear soups. Fluids help loosen mucus. Fluids are also important because they help prevent dehydration.    Gargle with warm salt water a few times a day to relieve a sore throat. Throat sprays or lozenges may also help relieve the pain.    Avoid alcohol.    Use saline (salt water) nose drops to help loosen mucus and moisten the tender skin in your nose.  Encouraged mucinex, warm salt water gargles, cepacol spray, soothers/lozenges, sinus rinses (neilmed), flonase (2 sprays per nostril daily x 2 weeks), vitamin c, fluids and rest.  May alternate tylenol and NSAIDS (ibuprofen, advil, aleve type products) every 4-6 hours for the next few days as needed.   No need for oral antibiotic at this time.             Follow-ups after your visit        Who to contact     If you have questions or need follow up information about today's clinic visit or your schedule please contact [facility] directly at [phone].  Normal or non-critical lab and imaging results will be communicated to you by MyChart, letter or phone within 4 business days after the clinic has received the results. If you do not hear from us within 7 days, please contact the clinic through [employer] or phone. If you have a critical or abnormal lab result, we will notify you by phone as soon as possible.  Submit refill requests through [employer] or call your pharmacy and they will forward the refill request to us. Please allow 3 business days for your refill to be completed.          Additional Information About Your Visit        [email or web address] Information     [employer] gives you secure access to your electronic health record. If you see a primary care provider, you can also send messages to your care team and make appointments. If you have questions, please call your primary care clinic.  If you do not have a primary care provider, please call [phone] and they will assist you.        Care EveryWhere ID     This is your Care EveryWhere ID. This could be used by other organizations to access your [place] medical records  [id number]        Your Vitals Were     Pulse Temperature Respirations Last Period Pulse Oximetry BMI (Body Mass Index)    88 99.1  F (37.3  C) (Oral) 16 08/28/2013 (LMP Unknown) 95% 23.4 kg/m2       Blood Pressure from Last 3 Encounters:   02/06/18 114/65   02/01/18 113/77   12/21/17 110/73    Weight from Last 3 Encounters:   02/06/18 145 lb (65.8 kg)   02/01/18 143 lb 2 oz (64.9 kg)   12/21/17 138 lb (62.6 kg)              Today, you had the following     No orders found for display         Today's Medication Changes          These changes are accurate as of 2/6/18  2:54 PM.  If you have any questions, ask your nurse or doctor.                Start taking these medicines.        Dose/Directions    albuterol 108 (90 BASE) MCG/ACT Inhaler   Commonly known as:  PROAIR HFA/PROVENTIL HFA/VENTOLIN HFA   Used for:  Acute bronchitis with symptoms > 10 days   Started by:  Any aHmm MD        Dose:  2 puff   Inhale 2 puffs into the lungs every 6 hours as needed for shortness of breath / dyspnea or wheezing   Quantity:  1 Inhaler   Refills:  0       benzonatate 100 MG capsule   Commonly known as:  TESSALON   Used for:  Acute bronchitis with symptoms > 10 days   Started by:  Any Hamm MD        Dose:  100 mg   Take 1 capsule (100 mg) by mouth 3 times daily as needed for cough   Quantity:  42 capsule   Refills:  0            Where to get your medicines      These medications were sent to [facility] [address] [place] [address] AT [facility] & [address] [place] [address]     Phone:  [phone]     albuterol 108 (90 BASE) MCG/ACT Inhaler    benzonatate 100 MG capsule                Primary Care Provider Office Phone # Fax #    Billy Huynh Mk Laboy [phone] [address] [facility] [address]        Equal Access to Services     RAMÓN SANTOS AH: Hadii jonathan ku hadasho Soomaali, waaxda luqadaha, qaybta kaalmada adeegyada, waxay winterin hayrivkan kevin sweeney. So [facility] [phone].    ATENCIÓN: Si habla español, tiene a johnston disposición servicios gratuitos de asistencia lingüística. Llame al [phone].    We comply with applicable federal civil rights laws and [place] laws. We do not discriminate on the basis of race, color, national origin, age, disability, sex, sexual orientation, or gender identity.            Thank you!     Thank you for choosing [facility]  for your care. Our goal is always to provide you with excellent care. Hearing back from our patients is one way we can continue to improve our services. Please take a few minutes to complete  the written survey that you may receive in the mail after your visit with us. Thank you!             Your Updated Medication List - Protect others around you: Learn how to safely use, store and throw away your medicines at www.disposemymeds.org.          This list is accurate as of 2/6/18  2:54 PM.  Always use your most recent med list.                   Brand Name Dispense Instructions for use Diagnosis    ACYCLOVIR PO      Take 100 mg by mouth daily        albuterol 108 (90 BASE) MCG/ACT Inhaler    PROAIR HFA/PROVENTIL HFA/VENTOLIN HFA    1 Inhaler    Inhale 2 puffs into the lungs every 6 hours as needed for shortness of breath / dyspnea or wheezing    Acute bronchitis with symptoms > 10 days       amitriptyline 10 MG tablet    ELAVIL     Take  by mouth At Bedtime.        azithromycin 250 MG tablet    ZITHROMAX    6 tablet    Two tablets first day, then one tablet daily for four days.    Viral URI with cough       benzonatate 100 MG capsule    TESSALON    42 capsule    Take 1 capsule (100 mg) by mouth 3 times daily as needed for cough    Acute bronchitis with symptoms > 10 days       calcium-magnesium 500-250 MG Tabs per tablet    CALMAG     Take 1 tablet by mouth daily        CYTOMEL PO      Take  by mouth. 1/2 tab daily        fluticasone 50 MCG/ACT spray    FLONASE    1 Bottle    Spray 1-2 sprays into both nostrils daily    Viral URI with cough       guaiFENesin-codeine 100-10 MG/5ML Soln solution    ROBITUSSIN AC    120 mL    Take 5 mLs by mouth every 4 hours as needed for cough    Viral URI with cough       SYNTHROID PO      Take 188 mcg by mouth daily . 1 mg daily        TAMOXIFEN CITRATE PO           TRAZODONE HCL PO      Take 25 mg by mouth

## 2018-02-06 NOTE — PROGRESS NOTES
SUBJECTIVE:   Shelia Avendaño is a 57 year old female who presents to clinic today for the following health issues:    Patient is in clinic for follow up on Bronchitis.   Started with cough only 3 weeks ago to the day. Had No head cold symptoms  Some sinus drainage but notes tends to always have that. Feels exhausted     seen by me 12/27 for Viral uri 7 throat pain resolved , had her reconstructive breast surgery, followed by episode of food poisoning, then flew to [place] & back & 1.5 week later started with URI symptoms. Seen 2/1 in clinic for cough & fatigue 2 weeks, given Flonase, robitussin with codeine. 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 Prior cxr in 2017 showed emphysematous changes, never smoked,no asthma , wondering from breast radiation she had in the past.     Rested  3 days, so finally started zpak couple days ago but yesterday and today at work   particularly bad. Repeatedly had to leave client meeting at work for coughing fits that seemed to throw her to the ground. Almost started choking in middle of coughing fit. Cough is Violent almost a if her insides trying to get rid of stuff. Makes her eyes water and nose runs briefly after coughing fit. Even though steps out of room it can be Rapides throughout her work. 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 Has had to leave every session she's had & Feels terrible. Its mostly Work feedback & their concern for her that prompted her to come back in today.     Gets hot flash on tamoxifen but no fever that she knows of. No chills, lots of headache though better today headache is mostly after coughing,  throat pain and sores back of tongue from coughing. Chest feels congested an icky. When coughing feels it  is productive but never able to cough anything out. Feels something  wet trying to get out of lungs. Nose runny after coughing violently and eyes stream, no trouble hearing, maybe slightly decreased sense of smell & taste, no problems swallowing, no chest pain,  trouble breathing or palpitations, No heart burn, reflux, nausea or vomiting or diarrhea, more constipated, no blood in stools or black stools, no weight loss or night sweats. No urinary complaints. No pelvic complaints. No leg swelling or rash. Or joint pain.     Problem list and histories reviewed & adjusted, as indicated.  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     Backache     Recurrent genital HSV (herpes simplex virus) infection     S/P radioactive iodine thyroid ablation     Past Surgical History:   Procedure Laterality Date     TONSILLECTOMY         Social History   Substance Use Topics     Smoking status: Never Smoker     Smokeless tobacco: Never Used     Alcohol use No     Family History   Problem Relation Age of Onset     Prostate Cancer Father 55     Thyroid Disease Mother          Current Outpatient Prescriptions   Medication Sig Dispense Refill     albuterol (PROAIR HFA/PROVENTIL HFA/VENTOLIN HFA) 108 (90 BASE) MCG/ACT Inhaler Inhale 2 puffs into the lungs every 6 hours as needed for shortness of breath / dyspnea or wheezing 1 Inhaler 0     benzonatate (TESSALON) 100 MG capsule Take 1 capsule (100 mg) by mouth 3 times daily as needed for cough 42 capsule 0     fluticasone (FLONASE) 50 MCG/ACT spray Spray 1-2 sprays into both nostrils daily 1 Bottle 0     azithromycin (ZITHROMAX) 250 MG tablet Two tablets  first day, then one tablet daily for four days. 6 tablet 0     guaiFENesin-codeine (ROBITUSSIN AC) 100-10 MG/5ML SOLN solution Take 5 mLs by mouth every 4 hours as needed for cough 120 mL 0     TAMOXIFEN CITRATE PO        TRAZODONE HCL PO Take 25 mg by mouth        calcium-magnesium (CALMAG) 500-250 MG TABS Take 1 tablet by mouth daily       ACYCLOVIR PO Take 100 mg by mouth daily        amitriptyline (ELAVIL) 10 MG tablet Take  by mouth At Bedtime.       Levothyroxine Sodium (SYNTHROID PO) Take 188 mcg by mouth daily . 1 mg daily       Liothyronine Sodium (CYTOMEL PO) Take  by mouth. 1/2 tab daily       No Known Allergies  Recent Labs   Lab Test  12/18/17   1415  03/01/17   1721  10/12/16   1005   A1C  5.3   --    --    LDL  88   --   140*   HDL  69   --   69   TRIG  98   --   45   ALT   --   25   --    CR  0.64  0.76  0.81   GFRESTIMATED  >90  79  73   GFRESTBLACK  >90  >90  African American GFR Calc    89   POTASSIUM  3.8  3.9  3.9      BP Readings from Last 3 Encounters:   02/06/18 114/65   02/01/18 113/77   12/21/17 110/73    Wt Readings from Last 3 Encounters:   02/06/18 145 lb (65.8 kg)   02/01/18 143 lb 2 oz (64.9 kg)   12/21/17 138 lb (62.6 kg)                  Labs reviewed in EPIC    Reviewed and updated as needed this visit by clinical staff       Reviewed and updated as needed this visit by Provider         ROS:  Constitutional, HEENT, cardiovascular, pulmonary, GI, , musculoskeletal, neuro, skin, endocrine and psych systems are negative, except as otherwise noted.    OBJECTIVE:     /65  Pulse 88  Temp 99.1  F (37.3  C) (Oral)  Resp 16  Wt 145 lb (65.8 kg)  LMP 08/28/2013 (LMP Unknown)  SpO2 95%  BMI 23.4 kg/m2  Body mass index is 23.4 kg/(m^2).  GENERAL: healthy, alert and no distress  EYES: Eyes grossly normal to inspection, PERRL and conjunctivae and sclerae normal  HENT: ear canals and TM's normal, nose and mouth without ulcers or lesions  NECK: no adenopathy, no asymmetry, masses, or  scars and thyroid normal to palpation  RESP: lungs clear to auscultation - no rales, rhonchi or wheezes, deep hacking coughs with talking, taking a deep breath and with no triggers  CV: regular rate and rhythm, normal S1 S2, no S3 or S4, no murmur, click or rub, no peripheral edema and peripheral pulses strong  ABDOMEN: soft, non tender, no hepatosplenomegaly, no masses and bowel sounds normal  MS: no gross musculoskeletal defects noted, no edema  SKIN: no suspicious lesions or rashes  NEURO: Normal strength and tone, mentation intact and speech normal  PSYCH: mentation appears normal, anxious, fatigued, judgement and insight intact and appearance well groomed    Diagnostic Test Results:  No results found for this or any previous visit (from the past 24 hour(s)).    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 Hx of closed  fracture toe Her last Dexa was 2016, C/w osteopenia worse than 2009, back pain, Overactive bladder: she has symptoms infrequently, so only takes Dietrol LA as needed.  Her symptoms include urinary urge and dysuria at the end of urination (different from UTI), takes Dietrol for about 2 weeks out of the year. Hypothyroid due to radioactive iodine ablation [place] for graves/ hyperthyroidism, on levothyroxine & liothyronine--[place] [facility] [place]. Recurrent genital HSV on suppressive therapy with acyclovir, not currently active. Arthritis in her hands, which she associates with her chronic Lyme's. 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 Seen 2/1 in clinic for cough & fatigue 2 weeks, given Flonase, robitussin with codeine. 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 Prior cxr in 2017 showed emphysematous changes , never smoked,no asthma , wondering if from breast radiation she had in the past.     1. Acute bronchitis with symptoms > 10 days  Continue Flonase 1 spray each nose daily. Complete zpak. Use the robitussin with codeine given at night. Currently don't suspect pneumonia. Was hesitant to get a cr given fear of radiation putting her at risk of breast cancer recurrence. Reassured zpak would cover whooping cough / atypical infection/ walking pneumonia anyway. Tessalon 100 mg three times a day to help with cough during the day time. Albuterol 2 puffs every 6 hrs for cough. If cough not better to call about prednisone. Hesitant to take the latter yet. If worse to come back for cxr. If getting better monitor and supportive cares as below  - albuterol (PRO AIR HFA/PROVENTIL HFA/VENTOLIN HFA) 108 (90 BASE) MCG/ACT Inhaler; Inhale 2 puffs into the lungs every 6 hours as needed for shortness of breath / dyspnea or wheezing  Dispense: 1 Inhaler; Refill: 0  - benzonatate (TESSALON) 100 MG capsule; Take 1 capsule (100 mg) by mouth 3 times daily as needed for cough  Dispense: 42 capsule; Refill: 0    2. Fatigue, unspecified type  Related to recent surgery, food poisoning, travel, URI, cough & stress related to that. Push fluids treat cough & see how it goes.     3. Personal history of breast cancer  No recurrence. Risk low with repeat cxr. But wants to wait to get that.       See Patient Instructions  Patient Instructions   Continue flonase 1 spray each nose daily   Complete zpak  Use robitussin with codeine at night    Currently dont suspect pneumonia  zpak would cover whooping cough / atypical infection/ walking pneumonia anyway   Albuterol 2 puffs every 6 hrs for cough. If cough not better call about prednisone  If worse come back for cxr   If getting better monitor and supportive cares as below    flonase 1 spray each nostril daily 2 weeks  Zyrtec 10 mg daily 2 week  mucinex 600 mg twice a day 1 week  Humidifier in room may help  supportive care as below  Go to the Er if worse  Follow up with primary if symptoms persist     Your symptoms and exam today indicate that you have a viral upper respiratory illness.  This includes viral rhinosinusitis and viral bronchitis.  Antibiotics do not help viral illnesses; the best remedies treat the symptoms (see below).  The typical course of a viral illness is that you feel rather miserable for the first few days - with sore throat, runny nose/nasal congestion, cough, and sometimes fever and body aches.  You should start to feel better after about 5-7 days and much better by 10-14 days.  If you develop sudden worsening of symptoms or fever after the first 5-7 days, or if you have persistence of your symptoms beyond 14 days, let us know as you may have developed a secondary bacterial infection.      For symptom relief I suggest tryin. Steam.  Take a long, hot shower.  Or if you don't want to get in the shower just run it with the bathroom door shut for a few minutes and breathe the steam.  2. Drink hot liquids frequently such as tea or hot water with honey and lemon.  3. Acetaminophen (Tylenol) and ibuprofen (Motrin or Advil) as needed for headache, sore throat, body aches, or fever.  4. For loosening phlegm and sputum try guaifenesin (available in many combination products and alone as plain Robitussin or plain Mucinex) and for cough suppression you can try dextromethorphan (Delsym or combined in other products).  5. For nasal congestion try:    An oral decongestant.  The only  decongestant I recommend is pseudoephedrine. Ask the pharmacist for the over the counter (but real) pseudoephedrine - not phenylephrine.  This can raise your blood pressure and heart rate so do not use this if you have hypertension.       Afrin spray for 3 days.  (Never use afrin nasal spray for more than 3 days as there is a risk of developing tolerance and rebound/worsening nasal congestion if used longer than this.)    Nealmed sinus rinses.    Nasal steroid spray such as nasacort or flonase, which are over-the-counter.  6. And most importantly: plenty of rest and sleep  especially while you have a fever.     Stop smoking and avoid secondhand smoke    Drink lots of fluids such as water and clear soups. Fluids help loosen mucus. Fluids are also important because they help prevent dehydration.    Gargle with warm salt water a few times a day to relieve a sore throat. Throat sprays or lozenges may also help relieve the pain.    Avoid alcohol.    Use saline (salt water) nose drops to help loosen mucus and moisten the tender skin in your nose.  Encouraged mucinex, warm salt water gargles, cepacol spray, soothers/lozenges, sinus rinses (neilmed), flonase (2 sprays per nostril daily x 2 weeks), vitamin c, fluids and rest.  May alternate tylenol and NSAIDS (ibuprofen, advil, aleve type products) every 4-6 hours for the next few days as needed.   No need for oral antibiotic at this time.        Any Hamm MD  [facility]

## 2018-02-08 ENCOUNTER — TELEPHONE (OUTPATIENT)
Dept: FAMILY MEDICINE | Facility: CLINIC | Age: 58
End: 2018-02-08

## 2018-02-08 ENCOUNTER — RADIANT APPOINTMENT (OUTPATIENT)
Dept: GENERAL RADIOLOGY | Facility: CLINIC | Age: 58
End: 2018-02-08
Attending: FAMILY MEDICINE
Payer: COMMERCIAL

## 2018-02-08 DIAGNOSIS — R05.9 COUGH: Primary | ICD-10-CM

## 2018-02-08 PROCEDURE — 71046 X-RAY EXAM CHEST 2 VIEWS: CPT

## 2018-02-08 NOTE — TELEPHONE ENCOUNTER
Phlegm could be form infection loosening up form zpak still in her system  I can put a cxr order in and she can get at her convenience and we can go form there

## 2018-02-08 NOTE — TELEPHONE ENCOUNTER
Patient states that she was seen Tuesday and had discussed with Dr Hamm possibility of doing a CXR  Was told to call back in a couple days to let her know how she was doing  Today patient started coughing up chunks of green/yellow phlegm  Has not seen this before  Wondering if she should be getting a CXR now    States Tuesday she started with sinus congestion that she had not been experiencing prior  Has been sick for about 3 weeks now  Is finishing up a z-pack now    Please advise, should patient come in for CXR?  Would she need to be seen again?  As she has been out from work she has clients she needs to see.   Poornima Astorga RN

## 2018-02-08 NOTE — TELEPHONE ENCOUNTER
Has been coughing for a couple weeks. No fever . She will schedule xray and call was given to FREDA Ponce to complete appt    Cecelia Ulrich, RN, BSN

## 2018-02-23 ENCOUNTER — OFFICE VISIT (OUTPATIENT)
Dept: FAMILY MEDICINE | Facility: CLINIC | Age: 58
End: 2018-02-23
Payer: COMMERCIAL

## 2018-02-23 VITALS
DIASTOLIC BLOOD PRESSURE: 67 MMHG | OXYGEN SATURATION: 97 % | RESPIRATION RATE: 19 BRPM | HEART RATE: 74 BPM | WEIGHT: 143.5 LBS | BODY MASS INDEX: 23.16 KG/M2 | TEMPERATURE: 98.4 F | SYSTOLIC BLOOD PRESSURE: 108 MMHG

## 2018-02-23 DIAGNOSIS — C50.911 MALIGNANT NEOPLASM OF RIGHT FEMALE BREAST, UNSPECIFIED ESTROGEN RECEPTOR STATUS, UNSPECIFIED SITE OF BREAST (H): ICD-10-CM

## 2018-02-23 DIAGNOSIS — J01.90 ACUTE SINUSITIS WITH COEXISTING CONDITION, NEED PROPHYLACTIC TREATMENT: Primary | ICD-10-CM

## 2018-02-23 PROCEDURE — 99214 OFFICE O/P EST MOD 30 MIN: CPT | Performed by: FAMILY MEDICINE

## 2018-02-23 RX ORDER — DOXYCYCLINE 100 MG/1
100 CAPSULE ORAL 2 TIMES DAILY
Qty: 20 CAPSULE | Refills: 0 | Status: SHIPPED | OUTPATIENT
Start: 2018-02-23 | End: 2018-03-14

## 2018-02-23 RX ORDER — PSEUDOEPHEDRINE HCL 30 MG
30 TABLET ORAL DAILY PRN
Qty: 14 TABLET | Refills: 0 | Status: SHIPPED | OUTPATIENT
Start: 2018-02-23 | End: 2018-06-18

## 2018-02-23 NOTE — MR AVS SNAPSHOT
After Visit Summary   2/23/2018    Shelia Avendaño    MRN: [id number]           Patient Information     Date Of Birth          1960        Visit Information        Provider Department      2/23/2018 8:40 AM Hussain Roy MD [facility]        Today's Diagnoses     Acute sinusitis with coexisting condition, need prophylactic treatment    -  1      Care Instructions    Flonase 2 nasal sprays once daily  Sudafed 30 mg in the morning  Zyrtec 10 mg in the morning  Please start antibiotic if your symptoms do not improve after 3 to 4 days           Follow-ups after your visit        Who to contact     If you have questions or need follow up information about today's clinic visit or your schedule please contact [facility] directly at [phone].  Normal or non-critical lab and imaging results will be communicated to you by MyChart, letter or phone within 4 business days after the clinic has received the results. If you do not hear from us within 7 days, please contact the clinic through MyChart or phone. If you have a critical or abnormal lab result, we will notify you by phone as soon as possible.  Submit refill requests through [employer] or call your pharmacy and they will forward the refill request to us. Please allow 3 business days for your refill to be completed.          Additional Information About Your Visit        MyChart Information     [employer] gives you secure access to your electronic health record. If you see a primary care provider, you can also send messages to your care team and make appointments. If you have questions, please call your primary care clinic.  If you do not have a primary care provider, please call [phone] and they will assist you.        Care EveryWhere ID     This is your Care EveryWhere ID. This could be used by other organizations to access your [facility] medical records  [id number]        Your Vitals Were     Pulse Temperature  Respirations Last Period Pulse Oximetry BMI (Body Mass Index)    74 98.4  F (36.9  C) (Tympanic) 19 08/28/2013 (LMP Unknown) 97% 23.16 kg/m2       Blood Pressure from Last 3 Encounters:   02/23/18 108/67   02/06/18 114/65   02/01/18 113/77    Weight from Last 3 Encounters:   02/23/18 143 lb 8 oz (65.1 kg)   02/06/18 145 lb (65.8 kg)   02/01/18 143 lb 2 oz (64.9 kg)              Today, you had the following     No orders found for display         Today's Medication Changes          These changes are accurate as of 2/23/18  9:29 AM.  If you have any questions, ask your nurse or doctor.               Start taking these medicines.        Dose/Directions    doxycycline 100 MG capsule   Commonly known as:  VIBRAMYCIN   Used for:  Acute sinusitis with coexisting condition, need prophylactic treatment   Started by:  Hussain Roy MD        Dose:  100 mg   Take 1 capsule (100 mg) by mouth 2 times daily   Quantity:  20 capsule   Refills:  0       pseudoePHEDrine 30 MG tablet   Commonly known as:  SUDAFED   Used for:  Acute sinusitis with coexisting condition, need prophylactic treatment   Started by:  Hussain Roy MD        Dose:  30 mg   Take 1 tablet (30 mg) by mouth daily as needed for congestion   Quantity:  14 tablet   Refills:  0         Stop taking these medicines if you haven't already. Please contact your care team if you have questions.     albuterol 108 (90 BASE) MCG/ACT Inhaler   Commonly known as:  PROAIR HFA/PROVENTIL HFA/VENTOLIN HFA   Stopped by:  Hussain Roy MD           azithromycin 250 MG tablet   Commonly known as:  ZITHROMAX   Stopped by:  Hussain Roy MD           guaiFENesin-codeine 100-10 MG/5ML Soln solution   Commonly known as:  ROBITUSSIN AC   Stopped by:  Hussain Roy MD                Where to get your medicines      [facility] [address] [address] AT [place] [address]  [facility] [address]     Phone:  [phone]     doxycycline 100 MG capsule         Some of these will need a paper prescription and others can be bought over the counter.  Ask your nurse if you have questions.     Bring a paper prescription for each of these medications     pseudoePHEDrine 30 MG tablet                Primary Care Provider Office Phone # Fax #    Billy Huynh Mk Laboy [phone]       [address]  [facility] [address]        Equal Access to Services     RAMÓN SANTOS : Hadii aad ku hadasho Soomaali, waaxda luqadaha, qaybta kaalmada adeegyada, waxay idiin hayaan ademicah he . So [facility] [phone].    ATENCIÓN: Si habla español, tiene a johnston disposición servicios gratuitos de asistencia lingüística. [facility] [phone].    We comply with applicable federal civil rights laws and [place] laws. We do not discriminate on the basis of race, color, national origin, age, disability, sex, sexual orientation, or gender identity.            Thank you!     Thank you for choosing [facility]  for your care. Our goal is always to provide you with excellent care. Hearing back from our patients is one way we can continue to improve our services. Please take a few minutes to complete the written survey that you may receive in the mail after your visit with us. Thank you!             Your Updated Medication List - Protect others around you: Learn how to safely use, store and throw away your medicines at www.disposemymeds.org.          This list is accurate as of 2/23/18  9:29 AM.  Always use your most recent med list.                   Brand Name Dispense Instructions for use Diagnosis    ACYCLOVIR PO      Take 100 mg by mouth daily        amitriptyline 10 MG tablet    ELAVIL     Take  by mouth At Bedtime.        calcium-magnesium 500-250 MG Tabs per tablet    CALMAG     Take 1 tablet by mouth daily        CYTOMEL PO      Take  by mouth. 1/2 tab  daily        doxycycline 100 MG capsule    VIBRAMYCIN    20 capsule    Take 1 capsule (100 mg) by mouth 2 times daily    Acute sinusitis with coexisting condition, need prophylactic treatment       fluticasone 50 MCG/ACT spray    FLONASE    1 Bottle    Spray 1-2 sprays into both nostrils daily    Viral URI with cough       pseudoePHEDrine 30 MG tablet    SUDAFED    14 tablet    Take 1 tablet (30 mg) by mouth daily as needed for congestion    Acute sinusitis with coexisting condition, need prophylactic treatment       SYNTHROID PO      Take 188 mcg by mouth daily . 1 mg daily        TAMOXIFEN CITRATE PO           TRAZODONE HCL PO      Take 25 mg by mouth

## 2018-02-23 NOTE — PROGRESS NOTES
SUBJECTIVE:   Shelia Avendaño is a 57 year old female who presents to clinic today for the following health issues:      RESPIRATORY SYMPTOMS      Duration: She was recently tx for acute bronchitis    Throughout the bronchitis she had sinus drainage, sore throat    Description  nasal congestion, sore throat(throat feels plugged), facial pain/pressure and cough    Severity: mild    Accompanying signs and symptoms: patch on tongue, throat red    History (predisposing factors):  none    Therapies tried and outcome:  Antihistamine, iliana pot, flonase     Wednesday night she had difficulty sleeping due to pain.   Last night she felt a little better.   She was taking Flonase PRN.   No fevers.     Problem list and histories reviewed & adjusted, as indicated.  Additional history: as documented    Labs reviewed in EPIC    Reviewed and updated as needed this visit by clinical staff  Tobacco  Med Hx  Surg Hx  Fam Hx  Soc Hx      Reviewed and updated as needed this visit by Provider         ROS:  Constitutional, HEENT, cardiovascular, pulmonary, gi and gu systems are negative, except as otherwise noted.    OBJECTIVE:     /67 (BP Location: Left arm, Patient Position: Sitting, Cuff Size: Adult Regular)  Pulse 74  Temp 98.4  F (36.9  C) (Tympanic)  Resp 19  Wt 143 lb 8 oz (65.1 kg)  LMP 08/28/2013 (LMP Unknown)  SpO2 97%  BMI 23.16 kg/m2  Body mass index is 23.16 kg/(m^2).  GENERAL: healthy, alert and no distress  EYES: Eyes grossly normal to inspection  HENT: ear canals and TM's normal, nose and mouth without ulcers or lesions  NECK: no adenopathy  RESP: lungs clear to auscultation - no rales, rhonchi or wheezes  CV: regular rate and rhythm, normal S1 S2    Diagnostic Test Results:  none     ASSESSMENT/PLAN:     ## Acute sinusitis with coexisting condition, need prophylactic treatment  - H/o grade I invasive ductal carcinoma of the right breast now S/P lumpectomy 10/19/2016 and chemotherapy w/ tamoxifen  -  Please start antibiotic if your symptoms do not improve after 3 to 4 days   - pseudoePHEDrine (SUDAFED) 30 MG tablet; Take 1 tablet (30 mg) by mouth daily as needed for congestion  Dispense: 14 tablet; Refill: 0  - doxycycline (VIBRAMYCIN) 100 MG capsule; Take 1 capsule (100 mg) by mouth 2 times daily  Dispense: 20 capsule; Refill: 0  - Flonase   - Follow if symptoms worsen or fail to improve.    Hussain Roy MD  [facility]

## 2018-02-23 NOTE — PATIENT INSTRUCTIONS
Flonase 2 nasal sprays once daily  Sudafed 30 mg in the morning  Zyrtec 10 mg in the morning  Please start antibiotic if your symptoms do not improve after 3 to 4 days

## 2018-03-14 ENCOUNTER — OFFICE VISIT (OUTPATIENT)
Dept: FAMILY MEDICINE | Facility: CLINIC | Age: 58
End: 2018-03-14
Payer: COMMERCIAL

## 2018-03-14 VITALS
WEIGHT: 141 LBS | RESPIRATION RATE: 20 BRPM | DIASTOLIC BLOOD PRESSURE: 66 MMHG | TEMPERATURE: 97.8 F | BODY MASS INDEX: 22.66 KG/M2 | OXYGEN SATURATION: 97 % | SYSTOLIC BLOOD PRESSURE: 107 MMHG | HEIGHT: 66 IN | HEART RATE: 108 BPM

## 2018-03-14 DIAGNOSIS — J00 NASOPHARYNGITIS: Primary | ICD-10-CM

## 2018-03-14 PROCEDURE — 99213 OFFICE O/P EST LOW 20 MIN: CPT | Performed by: FAMILY MEDICINE

## 2018-03-14 NOTE — MR AVS SNAPSHOT
After Visit Summary   3/14/2018    Shelia Avendaño    MRN: [id number]           Patient Information     Date Of Birth          1960        Visit Information        Provider Department      3/14/2018 3:00 PM Billy Yousif MD [facility]        Care Instructions    Nasal saline spray as needed.  Guaifenisen 400-600mg daily in the morning to loosen secretions.  Fluids.  Humidifier in the bedroom 30-40% humidity, change filter monthly.  Cold air humidifier not vaporizer. Better yet as weather warms and humidity level increases this can heal.  Benzocaine lozenges or spray for sore throat.  Cepacol is an example of this brand of product.          Follow-ups after your visit        Who to contact     If you have questions or need follow up information about today's clinic visit or your schedule please contact [facility] directly at [phone].  Normal or non-critical lab and imaging results will be communicated to you by Sputnik8hart, letter or phone within 4 business days after the clinic has received the results. If you do not hear from us within 7 days, please contact the clinic through Sputnik8hart or phone. If you have a critical or abnormal lab result, we will notify you by phone as soon as possible.  Submit refill requests through Cardize or call your pharmacy and they will forward the refill request to us. Please allow 3 business days for your refill to be completed.          Additional Information About Your Visit        Sputnik8hart Information     Cardize gives you secure access to your electronic health record. If you see a primary care provider, you can also send messages to your care team and make appointments. If you have questions, please call your primary care clinic.  If you do not have a primary care provider, please call [phone] and they will assist you.        Care EveryWhere ID     This is your Care EveryWhere ID. This could be used by  "other organizations to access your [place] medical records  [id number]        Your Vitals Were     Pulse Temperature Respirations Height Last Period Pulse Oximetry    108 97.8  F (36.6  C) (Oral) 20 5' 6\" (1.676 m) 08/28/2013 (LMP Unknown) 97%    Breastfeeding? BMI (Body Mass Index)                No 22.76 kg/m2           Blood Pressure from Last 3 Encounters:   03/14/18 107/66   02/23/18 108/67   02/06/18 114/65    Weight from Last 3 Encounters:   03/14/18 141 lb (64 kg)   02/23/18 143 lb 8 oz (65.1 kg)   02/06/18 145 lb (65.8 kg)              Today, you had the following     No orders found for display       Primary Care Provider Office Phone # Fax #    Billy Lino Laboy [phone]       [address]  [facility] [address]        Equal Access to Services     RAMÓN SANTOS : Hadii aad ku hadasho Soomaali, waaxda luqadaha, qaybta kaalmada adeegyada, waxay idiin hayaan kevin he . So [facility] [phone].    ATENCIÓN: Si habla español, tiene a johnston disposición servicios gratuitos de asistencia lingüística. Llame al [phone].    We comply with applicable federal civil rights laws and [place] laws. We do not discriminate on the basis of race, color, national origin, age, disability, sex, sexual orientation, or gender identity.            Thank you!     Thank you for choosing [facility]  for your care. Our goal is always to provide you with excellent care. Hearing back from our patients is one way we can continue to improve our services. Please take a few minutes to complete the written survey that you may receive in the mail after your visit with us. Thank you!             Your Updated Medication List - Protect others around you: Learn how to safely use, store and throw away your medicines at www.disposemymeds.org.          This list is accurate as of 3/14/18  3:20 PM.  Always use your most recent med list.                   Brand Name Dispense Instructions " for use Diagnosis    ACYCLOVIR PO      Take 100 mg by mouth daily        amitriptyline 10 MG tablet    ELAVIL     Take  by mouth At Bedtime.        calcium-magnesium 500-250 MG Tabs per tablet    CALMAG     Take 1 tablet by mouth daily        CYTOMEL PO      Take  by mouth. 1/2 tab daily        fluticasone 50 MCG/ACT spray    FLONASE    1 Bottle    Spray 1-2 sprays into both nostrils daily    Viral URI with cough       pseudoePHEDrine 30 MG tablet    SUDAFED    14 tablet    Take 1 tablet (30 mg) by mouth daily as needed for congestion    Acute sinusitis with coexisting condition, need prophylactic treatment       SYNTHROID PO      Take 188 mcg by mouth daily . 1 mg daily        TAMOXIFEN CITRATE PO           TRAZODONE HCL PO      Take 25 mg by mouth

## 2018-03-14 NOTE — PROGRESS NOTES
"  SUBJECTIVE:   Shelia Avendaño is a 57 year old female who presents to clinic today for the following health issues:      RESPIRATORY SYMPTOMS      Duration: around one month     Description  Post nasal drip     Severity: severe    Accompanying signs and symptoms: None    History (predisposing factors):  Recovering from bronchitis     Precipitating or alleviating factors: None    Therapies tried and outcome:  sudafed and ibuprofen     Had a bronchitis for 5 weeks, then turned into a sinus condition.    When she started having bronchitis, her first symptom was sinus drainage. Then her cough went away and she is having a lot of sinus congestion. She is constantly trying to swallow, but so much crud that she even has nausea. Some sore throat from rawness of throat.     Recently completed doxycycline, had been treated prior to that with azithromycin for bronchitis.    ROS  No fever  No ear pain  Cough has resolved    EXAM:  /66  Pulse 108  Temp 97.8  F (36.6  C) (Oral)  Resp 20  Ht 5' 6\" (1.676 m)  Wt 141 lb (64 kg)  LMP 08/28/2013 (LMP Unknown)  SpO2 97%  Breastfeeding? No  BMI 22.76 kg/m2  Constitutional: Healthy, alert, no distress   EENT: PERRL, TM's clear bilaterally, oropharynx without erythema, no anterior cervical lymphadenopathy   Cardiovascular: RRR. No murmurs   Respiratory: Clear to auscultation     ASSESSMENT    ICD-10-CM    1. Nasopharyngitis J00       Plan:  Patient Instructions   Nasal saline spray as needed.  Guaifenisen 400-600mg daily in the morning to loosen secretions.  Fluids.  Humidifier in the bedroom 30-40% humidity, change filter monthly.  Cold air humidifier not vaporizer. Better yet as weather warms and humidity level increases this can heal.  Benzocaine lozenges or spray for sore throat.  Cepacol is an example of this brand of product.  Could consider magic mouthwash if this worsens.     Billy Laboy MD  Family Medicine Physician     "

## 2018-03-14 NOTE — PATIENT INSTRUCTIONS
Nasal saline spray as needed.  Guaifenisen 400-600mg daily in the morning to loosen secretions.  Fluids.  Humidifier in the bedroom 30-40% humidity, change filter monthly.  Cold air humidifier not vaporizer. Better yet as weather warms and humidity level increases this can heal.  Benzocaine lozenges or spray for sore throat.  Cepacol is an example of this brand of product.  Could consider magic mouthwash if this worsens.

## 2018-03-27 ENCOUNTER — MYC MEDICAL ADVICE (OUTPATIENT)
Dept: FAMILY MEDICINE | Facility: CLINIC | Age: 58
End: 2018-03-27

## 2018-03-27 DIAGNOSIS — J00 NASOPHARYNGITIS: Primary | ICD-10-CM

## 2018-03-28 ENCOUNTER — E-VISIT (OUTPATIENT)
Dept: FAMILY MEDICINE | Facility: CLINIC | Age: 58
End: 2018-03-28
Payer: COMMERCIAL

## 2018-03-28 DIAGNOSIS — J20.9 ACUTE BRONCHITIS WITH SYMPTOMS > 10 DAYS: Primary | ICD-10-CM

## 2018-03-28 PROCEDURE — 99444 ZZC PHYSICIAN ONLINE EVALUATION & MANAGEMENT SERVICE: CPT | Performed by: FAMILY MEDICINE

## 2018-03-28 RX ORDER — AMOXICILLIN 875 MG
875 TABLET ORAL 2 TIMES DAILY
Qty: 20 TABLET | Refills: 0 | Status: SHIPPED | OUTPATIENT
Start: 2018-03-28 | End: 2018-06-18

## 2018-03-28 NOTE — TELEPHONE ENCOUNTER
Could do an e-visit, would recommend trial of antibiotics for sinusitis then consider perhaps allergies.     Billy Laboy MD  Family Medicine Physician

## 2018-04-10 ENCOUNTER — OFFICE VISIT (OUTPATIENT)
Dept: FAMILY MEDICINE | Facility: CLINIC | Age: 58
End: 2018-04-10
Payer: COMMERCIAL

## 2018-04-10 VITALS
BODY MASS INDEX: 22.5 KG/M2 | WEIGHT: 140 LBS | HEIGHT: 66 IN | HEART RATE: 104 BPM | TEMPERATURE: 98.7 F | SYSTOLIC BLOOD PRESSURE: 109 MMHG | OXYGEN SATURATION: 98 % | DIASTOLIC BLOOD PRESSURE: 66 MMHG

## 2018-04-10 DIAGNOSIS — R09.82 POST-NASAL DRIP: Primary | ICD-10-CM

## 2018-04-10 PROCEDURE — 99213 OFFICE O/P EST LOW 20 MIN: CPT | Performed by: INTERNAL MEDICINE

## 2018-04-10 ASSESSMENT — ENCOUNTER SYMPTOMS
FEVER: 0
SINUS PRESSURE: 0
SINUS PAIN: 0
HEADACHES: 0

## 2018-04-10 NOTE — PROGRESS NOTES
SUBJECTIVE:   Shelia Avendaño is a 57 year old female presenting with a chief complaint of   Chief Complaint   Patient presents with     Sinus Problem     Pt in clinic c/o week eight of sinus drainage.       She is an established patient of [facility].    URI Adult    Onset of symptoms was 3 month(s) ago.    Current and Associated symptoms:  Started with sinus drainage  Then bronchitis  Then persistent sinus drainage 8 weeks  No green - yellow mucous  Treatment measures tried include zpak,   afrin, zyrtec, sudafed  Also review chart last ABX 2/2018 zpak, then doxycycline, then 3/28 - amox  Nasal saline, mucinex  Predisposing factors include None.      Wonders if allergy    Review of Systems   Constitutional: Negative for fever.   HENT: Positive for postnasal drip. Negative for sinus pain and sinus pressure.    Neurological: Negative for headaches.       Past Medical History:   Diagnosis Date     Adjustment disorder with mixed anxiety and depressed mood 10/12/2011     Closed fracture of one or more phalanges of foot 1/19/2012     HSV infection 11/8/2017     Hypothyroid 10/8/2010     Osteoarthrosis, hand 7/20/2006    Overview:  LW Onset:  1990s ; DJD Hand     Osteopenia      Sinus problem      Family History   Problem Relation Age of Onset     Prostate Cancer Father 55     Thyroid Disease Mother      Current Outpatient Prescriptions   Medication Sig Dispense Refill     loratadine-pseudoePHEDrine (CLARITIN-D 24-HOUR)  MG per 24 hr tablet Take 1 tablet by mouth daily       fluticasone (FLONASE) 50 MCG/ACT spray Spray 1-2 sprays into both nostrils daily 1 Bottle 0     TAMOXIFEN CITRATE PO        TRAZODONE HCL PO Take 25 mg by mouth        calcium-magnesium (CALMAG) 500-250 MG TABS Take 1 tablet by mouth daily       ACYCLOVIR PO Take 100 mg by mouth daily        amitriptyline (ELAVIL) 10 MG tablet Take  by mouth At Bedtime.       Levothyroxine Sodium (SYNTHROID PO) Take 188 mcg by mouth daily . 1 mg daily        "Liothyronine Sodium (CYTOMEL PO) Take  by mouth. 1/2 tab daily       amoxicillin (AMOXIL) 875 MG tablet Take 1 tablet (875 mg) by mouth 2 times daily (Patient not taking: Reported on 4/10/2018) 20 tablet 0     pseudoePHEDrine (SUDAFED) 30 MG tablet Take 1 tablet (30 mg) by mouth daily as needed for congestion (Patient not taking: Reported on 4/10/2018) 14 tablet 0     Social History   Substance Use Topics     Smoking status: Never Smoker     Smokeless tobacco: Never Used     Alcohol use No       OBJECTIVE  /66  Pulse 104  Temp 98.7  F (37.1  C) (Tympanic)  Ht 5' 6\" (1.676 m)  Wt 140 lb (63.5 kg)  LMP 08/28/2013 (LMP Unknown)  SpO2 98%  BMI 22.6 kg/m2    Physical Exam   Constitutional: She appears well-developed and well-nourished.   HENT:   Nose: Nose normal.   Mouth/Throat: Oropharynx is clear and moist. No oropharyngeal exudate.   TM clear  PND clear   Cardiovascular: Normal rate, regular rhythm, normal heart sounds and intact distal pulses.    Pulmonary/Chest: Effort normal and breath sounds normal.   Lymphadenopathy:     She has no cervical adenopathy.       Labs:  No results found for this or any previous visit (from the past 24 hour(s)).        ASSESSMENT:      ICD-10-CM    1. Post-nasal drip R09.82         Medical Decision Making:    Differential Diagnosis:  URI Adult:  Sinusitis    Serious Comorbid Conditions:  Adult:  None    PLAN:      Patient Instructions   Flonase  Nasal saline  Avoid antihistamine as may thicken mucous    Menthol  Decongestant    Agree plan to see Otolaryngology, may need imaging  Could do broader spectrum antibiotics - such as cefdinir, augmentin    Take probiotics    Also agree allergy consult but illness started in late winter.                  "

## 2018-04-10 NOTE — NURSING NOTE
"Chief Complaint   Patient presents with     Sinus Problem     Pt in clinic c/o week eight of sinus drainage.       Initial /66  Pulse 104  Temp 98.7  F (37.1  C) (Tympanic)  Ht 1.676 m (5' 6\")  Wt 63.5 kg (140 lb)  LMP 08/28/2013 (LMP Unknown)  SpO2 98%  BMI 22.6 kg/m2 Estimated body mass index is 22.6 kg/(m^2) as calculated from the following:    Height as of this encounter: 1.676 m (5' 6\").    Weight as of this encounter: 63.5 kg (140 lb).  Medication Reconciliation: complete   Celeste Gilbert/ MA    "

## 2018-04-10 NOTE — MR AVS SNAPSHOT
After Visit Summary   4/10/2018    Shelia Avendaño    MRN: [id number]           Patient Information     Date Of Birth          1960        Visit Information        Provider Department      4/10/2018 4:15 PM Barb Fajardo MD [facility]        Care Instructions    Flonase  Nasal saline  Avoid antihistamine as may thicken mucous    Menthol  Decongestant    Agree plan to see Otolaryngology, may need imaging  Could do broader spectrum antibiotics - such as cefdinir, augmentin    Take probiotics    Also agree allergy consult but illness started in late winter.                    Follow-ups after your visit        Your next 10 appointments already scheduled     Jun 11, 2018 11:30 AM CDT   (Arrive by 11:15 AM)   New Patient Visit with Mil Moss MD   [facility] ([facility])    [address] [place] [id number] [phone]              Who to contact     If you have questions or need follow up information about today's clinic visit or your schedule please contact [facility] directly at [phone].  Normal or non-critical lab and imaging results will be communicated to you by MyChart, letter or phone within 4 business days after the clinic has received the results. If you do not hear from us within 7 days, please contact the clinic through KIYATEChart or phone. If you have a critical or abnormal lab result, we will notify you by phone as soon as possible.  Submit refill requests through Synapticon or call your pharmacy and they will forward the refill request to us. Please allow 3 business days for your refill to be completed.          Additional Information About Your Visit        MyChart Information     Synapticon gives you secure access to your electronic health record. If you see a primary care provider, you can also send messages to your care team and make appointments. If you have  "questions, please call your primary care clinic.  If you do not have a primary care provider, please call [phone] and they will assist you.        Care EveryWhere ID     This is your Care EveryWhere ID. This could be used by other organizations to access your [place] medical records  [id number]        Your Vitals Were     Pulse Temperature Height Last Period Pulse Oximetry BMI (Body Mass Index)    104 98.7  F (37.1  C) (Tympanic) 5' 6\" (1.676 m) 08/28/2013 (LMP Unknown) 98% 22.6 kg/m2       Blood Pressure from Last 3 Encounters:   04/10/18 109/66   03/14/18 107/66   02/23/18 108/67    Weight from Last 3 Encounters:   04/10/18 140 lb (63.5 kg)   03/14/18 141 lb (64 kg)   02/23/18 143 lb 8 oz (65.1 kg)              Today, you had the following     No orders found for display       Primary Care Provider Office Phone # Fax #    Billy Huynh Mk Laboy [phone]       [address]  [facility] [address]        Equal Access to Services     [facility] AH: Hadii aad ku hadasho Soomaali, waaxda luqadaha, qaybta kaalmada adeegyada, karl gagnon hayrivkan kevin he . So [facility] [phone].    ATENCIÓN: Si habla español, tiene a johnston disposición servicios gratuitos de asistencia lingüística. Llame al [phone].    We comply with applicable federal civil rights laws and [place] laws. We do not discriminate on the basis of race, color, national origin, age, disability, sex, sexual orientation, or gender identity.            Thank you!     Thank you for choosing [facility]  for your care. Our goal is always to provide you with excellent care. Hearing back from our patients is one way we can continue to improve our services. Please take a few minutes to complete the written survey that you may receive in the mail after your visit with us. Thank you!             Your Updated Medication List - Protect others around you: Learn how to safely use, store and throw away your " medicines at www.disposemymeds.org.          This list is accurate as of 4/10/18  4:53 PM.  Always use your most recent med list.                   Brand Name Dispense Instructions for use Diagnosis    ACYCLOVIR PO      Take 100 mg by mouth daily        amitriptyline 10 MG tablet    ELAVIL     Take  by mouth At Bedtime.        amoxicillin 875 MG tablet    AMOXIL    20 tablet    Take 1 tablet (875 mg) by mouth 2 times daily    Acute bronchitis with symptoms > 10 days       calcium-magnesium 500-250 MG Tabs per tablet    CALMAG     Take 1 tablet by mouth daily        CYTOMEL PO      Take  by mouth. 1/2 tab daily        fluticasone 50 MCG/ACT spray    FLONASE    1 Bottle    Spray 1-2 sprays into both nostrils daily    Viral URI with cough       loratadine-pseudoePHEDrine  MG per 24 hr tablet    CLARITIN-D 24-hour     Take 1 tablet by mouth daily        pseudoePHEDrine 30 MG tablet    SUDAFED    14 tablet    Take 1 tablet (30 mg) by mouth daily as needed for congestion    Acute sinusitis with coexisting condition, need prophylactic treatment       SYNTHROID PO      Take 188 mcg by mouth daily . 1 mg daily        TAMOXIFEN CITRATE PO           TRAZODONE HCL PO      Take 25 mg by mouth

## 2018-04-10 NOTE — PATIENT INSTRUCTIONS
Flonase  Nasal saline  Avoid antihistamine as may thicken mucous    Menthol  Decongestant    Agree plan to see Otolaryngology, may need imaging  Could do broader spectrum antibiotics - such as cefdinir, augmentin    Take probiotics    Also agree allergy consult but illness started in late winter.

## 2018-05-11 ENCOUNTER — TRANSFERRED RECORDS (OUTPATIENT)
Dept: HEALTH INFORMATION MANAGEMENT | Facility: CLINIC | Age: 58
End: 2018-05-11

## 2018-06-05 NOTE — TELEPHONE ENCOUNTER
Phone Call:     Contact Name [facility] PHONE# [phone]   Outcome CALLED TO GET RECORDS FAXED OVER

## 2018-06-05 NOTE — TELEPHONE ENCOUNTER
FUTURE VISIT INFORMATION      FUTURE VISIT INFORMATION:    Date: 06/18/2018    Time: 2:10    Location: [facility]  REFERRAL INFORMATION:    Referring provider:  Dr. HANEY    Referring providers clinic:  [facility]    Reason for visit/diagnosis  Chronic Sore Throat    RECORDS REQUESTED FROM:       Clinic name Comments Records Status Imaging Status   [place] OFFICE VISIT: 02/23/2018-01/24/2017  IMAGE: CHEST X-RAY 02/08/2018, 04/17/2017 INTERNAL YES   [facility] OFFICE VISIT: 05/11/2018 PROCEDURE: EGD INTERNAL NONE                             RECORDS STATUS    SPOKE WITH THE OFFICE OF Dr. GLOVER  WILL NOT SEND RECORDS WITHOUT A SANAZ SAYS THIS DR NEVER REFERRED HER TO SEE DR MO OR ANY ENT PROVIDER WILL CALL PT (06/05/2018)  CALLED LEFT PT A VM THAT WE ARE NEEDING A SANAZ ( 06/05/2018)      PER PT WAS REFERRED BY DR. JAI HANEY INTAKE REP NOTES WERE INCORRECT FOR REFERRING PROVIDER

## 2018-06-18 ENCOUNTER — OFFICE VISIT (OUTPATIENT)
Dept: OTOLARYNGOLOGY | Facility: CLINIC | Age: 58
End: 2018-06-18
Payer: COMMERCIAL

## 2018-06-18 ENCOUNTER — PRE VISIT (OUTPATIENT)
Dept: OTOLARYNGOLOGY | Facility: CLINIC | Age: 58
End: 2018-06-18

## 2018-06-18 VITALS
WEIGHT: 139 LBS | BODY MASS INDEX: 23.16 KG/M2 | OXYGEN SATURATION: 99 % | SYSTOLIC BLOOD PRESSURE: 113 MMHG | HEIGHT: 65 IN | DIASTOLIC BLOOD PRESSURE: 74 MMHG | HEART RATE: 78 BPM

## 2018-06-18 DIAGNOSIS — J31.2 CHRONIC SORE THROAT: ICD-10-CM

## 2018-06-18 DIAGNOSIS — R49.0 DYSPHONIA: ICD-10-CM

## 2018-06-18 DIAGNOSIS — J38.7 IRRITABLE LARYNX SYNDROME: Primary | ICD-10-CM

## 2018-06-18 DIAGNOSIS — J38.7 IRRITABLE LARYNX: Primary | ICD-10-CM

## 2018-06-18 ASSESSMENT — PAIN SCALES - GENERAL: PAINLEVEL: NO PAIN (1)

## 2018-06-18 NOTE — MR AVS SNAPSHOT
After Visit Summary   6/18/2018    Shelia Avendaño    MRN: [id number]           Patient Information     Date Of Birth          1960        Visit Information        Provider Department      6/18/2018 2:10 PM Deanna Turner, SLP [facility]        Today's Diagnoses     Irritable larynx syndrome    -  1    Dysphonia           Follow-ups after your visit        Who to contact     Please call your clinic at [phone] to:    Ask questions about your health    Make or cancel appointments    Discuss your medicines    Learn about your test results    Speak to your doctor            Additional Information About Your Visit        MyChart Information     [employer] gives you secure access to your electronic health record. If you see a primary care provider, you can also send messages to your care team and make appointments. If you have questions, please call your primary care clinic.  If you do not have a primary care provider, please call [phone] and they will assist you.      [employer] is an electronic gateway that provides easy, online access to your medical records. With [employer], you can request a clinic appointment, read your test results, renew a prescription or communicate with your care team.     To access your existing account, please contact your [facility] or call [phone] for assistance.        Care EveryWhere ID     This is your Care EveryWhere ID. This could be used by other organizations to access your [place] medical records  [id number]        Your Vitals Were     Last Period                   08/28/2013 (LMP Unknown)            Blood Pressure from Last 3 Encounters:   06/18/18 113/74   04/10/18 109/66   03/14/18 107/66    Weight from Last 3 Encounters:   06/18/18 63 kg (139 lb)   04/10/18 63.5 kg (140 lb)   03/14/18 64 kg (141 lb)              We Performed the Following     [facility]ALITAT[facility]NALYS[facility] AND [facility]ONANCE        Primary  Care Provider Office Phone # Fax #    Billy Huynh Mk Laboy [phone] [address] [address]        Equal Access to Services     RAMÓN SANTOS : Hadcamilo jonathan delong aamiro Soanyali, waaxda luqadaha, qaybta kaalmada doe, karl yanez laCarlosherbie sweeney. So [facility] [phone].    ATENCIÓN: Si habla español, tiene a johnston disposición servicios gratuitos de asistencia lingüística. Llame al [phone].    We comply with applicable federal civil rights laws and [place] laws. We do not discriminate on the basis of race, color, national origin, age, disability, sex, sexual orientation, or gender identity.            Thank you!     Thank you for choosing [facility]  for your care. Our goal is always to provide you with excellent care. Hearing back from our patients is one way we can continue to improve our services. Please take a few minutes to complete the written survey that you may receive in the mail after your visit with us. Thank you!             Your Updated Medication List - Protect others around you: Learn how to safely use, store and throw away your medicines at www.disposemymeds.org.          This list is accurate as of 6/18/18 11:59 PM.  Always use your most recent med list.                   Brand Name Dispense Instructions for use Diagnosis    ACYCLOVIR PO      Take 100 mg by mouth daily        amitriptyline 10 MG tablet    ELAVIL     Take  by mouth At Bedtime.        calcium-magnesium 500-250 MG Tabs per tablet    CALMAG     Take 1 tablet by mouth daily        CYTOMEL PO      Take  by mouth. 1/2 tab daily        fluticasone 50 MCG/ACT spray    FLONASE    1 Bottle    Spray 1-2 sprays into both nostrils daily    Viral URI with cough       loratadine-pseudoePHEDrine  MG per 24 hr tablet    CLARITIN-D 24-hour     Take 1 tablet by mouth daily        SYNTHROID PO      Take 188 mcg by mouth daily . 1 mg daily        TAMOXIFEN CITRATE PO

## 2018-06-18 NOTE — NURSING NOTE
"Chief Complaint   Patient presents with     Consult     chronic sore throat      Blood pressure 113/74, pulse 78, height 1.66 m (5' 5.35\"), weight 63 kg (139 lb), last menstrual period 08/28/2013, SpO2 99 %, not currently breastfeeding.    Ryan Hansen LPN    "

## 2018-06-18 NOTE — MR AVS SNAPSHOT
After Visit Summary   6/18/2018    Shelia Avendaño    MRN: [id number]           Patient Information     Date Of Birth          1960        Visit Information        Provider Department      6/18/2018 2:10 PM Danielle Maloney MD  [facility]        Today's Diagnoses     Irritable larynx    -  1    Chronic sore throat          Care Instructions    1.  You were seen in the ENT Clinic today by Dr. Maloney.  If you have any questions or concerns after your appointment, please call [phone].  Press option #1 for scheduling related needs.  Press option #3 for Nurse advice.  2.  Plan is to return to clinic as needed.  3. Please initiate speech therapy at the [facility].  Call Kristen at [phone] if you require assistance in scheduling.    4. If dysphagia persists despite speech therapy, a video swallow study may be considered.    Ashley GRANT, RN  [facility] ENT   Head & Neck Surgery               Follow-ups after your visit        Additional Services     OTOLARYNGOLOGY REFERRAL       SPEECH-LANGUAGE PATHOLOGY SERVICE(S) REQUESTED:  Evaluate and treat    Deanna Turner, Ph.D., CCC/SLP  Speech-Language Pathologist  Director, [facility]  [phone]    Ritika Davidson M.M., M.A., CCC/SLP  Speech-Language Pathologist  [facility]  [phone]                  Who to contact     Please call your clinic at [phone] to:    Ask questions about your health    Make or cancel appointments    Discuss your medicines    Learn about your test results    Speak to your doctor            Additional Information About Your Visit        HipGeohart Information     Tengaged gives you secure access to your electronic health record. If you see a primary care provider, you can also send messages to your care team and make appointments. If you have questions, please call your primary care clinic.  If you do not have a primary  "care provider, please call [phone] and they will assist you.      AddThis is an electronic gateway that provides easy, online access to your medical records. With AddThis, you can request a clinic appointment, read your test results, renew a prescription or communicate with your care team.     To access your existing account, please contact your [facility] or call [phone] for assistance.        Care EveryWhere ID     This is your Care EveryWhere ID. This could be used by other organizations to access your [facility] medical records  [id number]        Your Vitals Were     Pulse Height Last Period Pulse Oximetry BMI (Body Mass Index)       78 1.66 m (5' 5.35\") 08/28/2013 (LMP Unknown) 99% 22.88 kg/m2        Blood Pressure from Last 3 Encounters:   06/18/18 113/74   04/10/18 109/66   03/14/18 107/66    Weight from Last 3 Encounters:   06/18/18 63 kg (139 lb)   04/10/18 63.5 kg (140 lb)   03/14/18 64 kg (141 lb)              We Performed the Following     IMAGESTREAM RECORDING ORDER     LARYNGOSCOPY FLEX FIBEROPTIC, DIAGNOSTIC     OTOLARYNGOLOGY REFERRAL        Primary Care Provider Office Phone # Fax #    Billy Huynh Mk [facility]oy [phone] [address]  [facility] [id number]        Equal Access to Services     RMAÓN SANTOS AH: Hadii aad ku hadasho Soomaali, waaxda luqadaha, qaybta kaalmada adeegyada, karl sweeney. So [facility] [phone].    ATENCIÓN: Si habla español, tiene a johnston disposición servicios gratuitos de asistencia lingüística. Llame al [phone].    We comply with applicable federal civil rights laws and [place] laws. We do not discriminate on the basis of race, color, national origin, age, disability, sex, sexual orientation, or gender identity.            Thank you!     Thank you for choosing [facility] EAR NOSE AND TH[facility]AT  for your care. Our goal is always to provide you with excellent care. Hearing back from " our patients is one way we can continue to improve our services. Please take a few minutes to complete the written survey that you may receive in the mail after your visit with us. Thank you!             Your Updated Medication List - Protect others around you: Learn how to safely use, store and throw away your medicines at www.disposemymeds.org.          This list is accurate as of 6/18/18 11:59 PM.  Always use your most recent med list.                   Brand Name Dispense Instructions for use Diagnosis    ACYCLOVIR PO      Take 100 mg by mouth daily        amitriptyline 10 MG tablet    ELAVIL     Take  by mouth At Bedtime.        calcium-magnesium 500-250 MG Tabs per tablet    CALMAG     Take 1 tablet by mouth daily        CYTOMEL PO      Take  by mouth. 1/2 tab daily        fluticasone 50 MCG/ACT spray    FLONASE    1 Bottle    Spray 1-2 sprays into both nostrils daily    Viral URI with cough       loratadine-pseudoePHEDrine  MG per 24 hr tablet    CLARITIN-D 24-hour     Take 1 tablet by mouth daily        SYNTHROID PO      Take 188 mcg by mouth daily . 1 mg daily        TAMOXIFEN CITRATE PO

## 2018-06-18 NOTE — LETTER
6/18/2018      RE: Shelia Avendaño  [address] [place] [id number]       Dear Ms Stephenyohan:    I had the pleasure of meeting Shelia Avendaño in consultation at the [facility] at your request, for evaluation of throat concerns. The note from our visit follows. Speech recognition software may have been used in the documentation below; input is reviewed before signature to the best of my ability. I appreciate the opportunity to participate in the care of this pleasant patient.    Please feel free to contact me with any questions.    Sincerely yours,    Danielle Maloney M.D., M.P.H.  , Laryngology  Director, [facility]  Otolaryngology- Head & Neck Surgery  [phone]          =====    History of Present Illness:   Shelia Avendaño is a pleasant 57-year-old female with a history of Lyme who presents with a five to six month history of throat concerns. Symptoms include throat tightness, mucus in throat and frequent throat-clearing.      Voice  She was seen by Dr. Collado and Dr. 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 At that time, she had done well with a course of speech therapy.  Her current symptoms are quite different, her voice does still have some muscle tension in her opinion, but this is not a significant concern      Swallowing  She has had some difficulty with swallowing over the past few years.  She recalls having bad bronchitis in January/February just before the problem began.  She does need to cut her pills in half sometimes to swallow.  No recent swallow studies.  She does experience increased swallowing effort with pills.      Cough/Throat-clearing  She developed a severe cough in the context of bronchitis earlier this year as noted above.  This persisted for about six weeks, and thereafter she had a chronic sensation of postnasal drainage  which provoked very frequent throat clearing.  She then went to [place] on vacation, and had complete resolution of her symptoms for about two weeks.  Since she returned, she has had some partial recurrence of her symptoms just over the past week, but is still doing much better overall than she had been prior to her travel.  She reports that they are in the process of redoing the kitchen, and the smells and dust from the construction materials do seem to bother her a little bit.    Breathing  No concerns.     Throat discomfort  She was sent to see us for the sensation of persistent mucus in her throat. She saw an otolaryngologist before some recent travel, and laryngoscopy was reportedly normal. The records for this visit are not available to me. This has improved considerably, and was essentially [place]. Since returning she has had some partial recurrence of her symptoms but is still doing much better overall.      Reflux-type symptoms  She experiences heartburn/indigestion rarely. She is not taking reflux medications. A Restech was negative in 2010. She was seen at [facility] in May 2018, and upper endoscopy with pH testing is planned.      Prior EPIC and outside records were reviewed for this visit.       MEDICATIONS:     Current Outpatient Prescriptions   Medication Sig Dispense Refill     ACYCLOVIR PO Take 100 mg by mouth daily        amitriptyline (ELAVIL) 10 MG tablet Take  by mouth At Bedtime.       calcium-magnesium (CALMAG) 500-250 MG TABS Take 1 tablet by mouth daily       fluticasone (FLONASE) 50 MCG/ACT spray Spray 1-2 sprays into both nostrils daily 1 Bottle 0     Levothyroxine Sodium (SYNTHROID PO) Take 188 mcg by mouth daily . 1 mg daily       Liothyronine Sodium (CYTOMEL PO) Take  by mouth. 1/2 tab daily       loratadine-pseudoePHEDrine (CLARITIN-D 24-HOUR)  MG per 24 hr tablet Take 1 tablet by mouth daily       TAMOXIFEN CITRATE PO           ALLERGIES:  No Known Allergies    PAST MEDICAL HISTORY:   Past Medical History:   Diagnosis Date     Adjustment disorder with mixed anxiety and depressed mood 10/12/2011     Closed fracture of one or more phalanges of foot 1/19/2012     HSV infection 11/8/2017     Hypothyroid 10/8/2010     Osteoarthrosis, hand 7/20/2006    Overview:  LW Onset:  1990s ; DJD Hand     Osteopenia      Sinus problem         PAST SURGICAL HISTORY:   Past Surgical History:   Procedure Laterality Date     TONSILLECTOMY         HABITS/SOCIAL HISTORY:    Social History   Substance Use Topics     Smoking status: Never Smoker     Smokeless tobacco: Never Used     Alcohol use No         FAMILY HISTORY:    Family History   Problem Relation Age of Onset     Prostate Cancer Father 55     Thyroid Disease Mother        REVIEW OF SYSTEMS:  The patient completed a comprehensive 11 point review of systems (below), which was reviewed. Positives are as noted below; pertinent findings are as noted in the HPI.     Patient Supplied Answers to Review of Systems  [facility] ENT ROS 6/18/2018   Constitutional Problems with sleep   Ears, Nose, Throat Ringing/noise in ears, Nasal congestion or drainage, Sore throat, Trouble swallowing   Musculoskeletal Sore or stiff joints       PHYSICAL EXAMINATION:  General: The patient was alert and conversant, and in no acute distress.    Eyes: PERRL, conjunctiva and lids normal, sclera nonicteric.  Nose: Anterior rhinoscopy: no gross abnormalities. no  bleeding; no  mucopurulence; septum grossly normal, mild mucoid drainage and/or crusting.  Oral cavity/oropharynx: No masses or lesions. Dentition in fair condition. Floor of mouth and oral tongue soft to palpation. Tongue mobility and palate elevation intact and symmetric.  Ears: Normal auricles, external auditory canals bilaterally. Visualized portions of tympanic membranes normal bilaterally.   Neck: No palpable cervical lymphadenopathy. There was mild tenderness to palpation  of the thyrohyoid space, which was narrow. No obvious thyroid abnormality. Landmarks palpable.  Resp: Breathing comfortably, no stridor or stertor.  Neuro: Symmetric facial function. Other cranial nerves as documented above.  Psych: Normal affect, pleasant and cooperative.  Voice/speech: No significant dysphonia.  Extremities: No cyanosis, clubbing, or edema of the upper extremities.    Intake scores  Total Score for Last Patient-Answered VHI Questionnaire  VHI Total Score 6/18/2018   VHI Total Score 3     Total Score for Last Patient-Answered EAT Questionnaire  EAT Total Score 6/18/2018   EAT Total Score 3     Total Score for Last Patient-Answered CSI Questionnaire  CSI Total Score 6/18/2018   CSI Total Score 2       PROCEDURE:   Flexible fiberoptic laryngoscopy  Indications: This procedure was warranted to evaluate the patient's laryngeal anatomy and function. Risks, benefits, and alternatives were discussed.  Description: After written informed consent was obtained, a time-out was performed to confirm patient identity, procedure, and procedure site. Topical 3% lidocaine with 0.25% phenylephrine was applied to the nasal cavities. I performed the endoscopy and no complications were apparent.  Performed by: Danielle Maloney MD MPH  SLP: Deanna Turner, PhD, CCC-SLP   Findings: Normal nasopharynx. Normal base of tongue, valleculae, and epiglottis. The right true vocal fold demonstrated normal mobility. The left true vocal fold demonstrated normal mobility. The medial edges of the vocal folds appeared smooth and straight. No focal mucosal lesions were observed on the true vocal folds. Glissade produced appropriate elongation. There was mild to moderate supraglottic recruitment with connected speech. Mucosa of the false vocal folds, aryepiglottic folds, piriform sinuses, and posterior glottis unremarkable. Airway was patent. Scattered supraglottic lymphoid tissue, does not appear inflamed.      IMPRESSION AND  PLAN:   Shelia Avendaño presents with irritable larynx syndrome following bronchitis and possible sinusitis earlier this year.  She also has an ongoing workup with [facility] for acid reflux.     I recommended speech therapy as initial primary management, with goals including reducing laryngeal irritability, improving laryngeal efficiency and improving respiratory/phonatory coordination.  We discussed that if her dysphagia persists despite therapy, a next step would be a formal swallow evaluation with a video fluoroscopy swallow study.     She will return as needed. I appreciate the opportunity to participate in the care of this pleasant patient.     Danielle Maloney MD

## 2018-06-18 NOTE — PATIENT INSTRUCTIONS
1.  You were seen in the ENT Clinic today by Dr. Maloney.  If you have any questions or concerns after your appointment, please call [phone].  Press option #1 for scheduling related needs.  Press option #3 for Nurse advice.  2.  Plan is to return to clinic as needed.  3. Please initiate speech therapy at the [facility].  Call Kristen at [phone] if you require assistance in scheduling.    4. If dysphagia persists despite speech therapy, a video swallow study may be considered.    Ashley PRIESTN, RN  [facility] ENT   Head & Neck Surgery

## 2018-06-18 NOTE — PROGRESS NOTES
"[facility] VOICE CLINIC  Steve Collado Jr., M.D., F.A.C.S.  Danielle Maloney M.D., M.P.H.  Deanna Turner, Ph.D., CCC/SLP  Ritika Davidson M.M. (voice), M.A., CCC/SLP  Han Pierre M.M. (voice), M.A., CCC/SLP    [facility] VOICE [facility]  VOICE/SPEECH/BREATHING EVALUATION AND LARYNGEAL EXAMINATION REPORT    Patient: Shelia Avendaño  Date of Visit: 6/18/2018    CHIEF COMPLAINT:  ***    HISTORY  Shelia Avendaño was seen for evaluation and laryngeal examination in conjunction with a visit to Dr. Maloney.  Please refer to Dr. Maloney s dictation for a more complete history and impressions. Salient details of her history are as follows:    Ms. Avendaño is a 57 year old with a history of dysphonia and related problems.    Seen by Dr. Collado and myself in 2015; treated for muscle tension dysphonia    Current symptoms began in January/February this year, with a 6 week bout of bronchitis with very severe coughing    Symptoms suddenly changed from the bronchitis symptoms to a sensation of \"a flood of mucus\" ouring down her throat    Need to throat clear very frequently for 4 months    DIAGNOSIS/REASON FOR REFERRAL  Dysphonia/ Evaluate, perform laryngeal exam, treat as appropriate    .  Patient Supplied Answers To [facility] Intake General Questionnaire  [facility] - General Form Review 6/18/2018   Are you having any of these symptoms? Throat tightness, Mucus in throat, Frequent throat-clearing   Do you use caffeine? No   How many oz. of non-caffeinated fluid do you typically drink in a day? 80   How often do you experience heartburn, indigestion, chest pain, stomach acid coming up, and/or tasting acid in your mouth or throat? Rarely   If you marked \"Other\", please comment: past spring about a couple times a month   Have reflux medications been recommended to you? No   If yes, are you taking them regularly? N/A   Swallowing: Yes   Cough and/or throat-clearing: Yes   A different problem, not listed above: Yes   Other physicians/health care " providers who should receive a copy of today's note (please provide first and last name(s), city): lisa buck, [facility]       Patient Supplied Answers To [facility] Intake Throat Discomfort Questionnaire  No flowsheet data found.    Patient Supplied Answers To [facility] Intake Cough/Throat-Clearing Questionnaire  [facility] Intake - Cough/Throat-Clearing Review 6/18/2018   Was there anything unusual about the time this cough/throat-clearing problem was first noticed (such as illness, accident, surgery, etc)? If yes, please describe.  You have 200 characters to respond.  We will ask for more detail at your visit. since february   What do you think caused this cough/throat-clearing problem? excessive mucus in throat   How quickly did the cough/throat-clearing problem develop? Suddenly   How do the cough/throat-clearing symptoms vary? Worse in the PM, Most of the time   Over time, how has the cough/throat-clearing problem changed? Better       Patient Supplied Answers To [facility] Intake Swallow Questionnaire  [facility] Intake - Swallow Review 6/18/2018   How long have you had this swallowing problem? a few years, this spring exaceebated by excessive mucus whichcame onsuddenly   How quickly did the swallowing problem develop? Gradually   Did any specific event(s) occur around the time the swallowing problem was first noticed (such as illness, accident, surgery, etc)?  If yes, please describe? You have 200 characters to respond.  We will ask for more detail at your visit. bad brochitis january feb ust before mucus problem develope   Have you ever had a swallowing problem before?  If yes, when? rexently mucus  previouslyconstricted throat   Do you feel a lump in your throat? No   Over time, how has the swallowing problem changed? Better   What is your current type of food intake? Regular   What is your current type of liquid intake? Regular - thin liquids   What health care providers have you seen for this swallowing  "problem?  Who and when? current mucus problem family practice , ent at [facility], gastroenterology. pastlong term saul urena [facility] and speech therapist   Did you receive any therapy or treatment for this swallowing problem?  If so, please describe briefly. past problem yes not current problem   Have you changed your diet because of your swallowing problem?  If yes, how? cut some pills in half so dont get stuck   Have you had a prior swallow study? Yes   If yes, how long ago was your last swallow study? not sure but may have had this at some point   Have you had a pneumonia or other severe chest infection within the past 6 months? Not sure   For your swallowing problem, is there anything else you'd like to tell us? main concern now is excessive mucus in the throat for four months after bronchitis resolved i february.  rexkrishna symltoms went away and returned at a minor level       Patient Supplied Answers To VHI Questionnaire  Voice Handicap Index (VHI-10) 6/18/2018   My voice makes it difficult for people to hear me 1   People have difficulty understanding me in a noisy room 2   My voice difficulties restrict my personal and social life.  0   I feel left out of conversations because of my voice 0   My voice problem causes me to lose income 0   I feel as though I have to strain to produce voice 0   The clarity of my voice is unpredictable 0   My voice problem upsets me 0   My voice makes me feel handicapped 0   People ask, \"What's wrong with your voice?\" 0   VHI-10 3       Patient Supplied Answers To EAT Questionnaire  Eating Assessment Tool (EAT-10) 5/12/2015   My swallowing problem has caused me to lose weight 0   My swallowing problem interferes with my ability to go out for meals 0   Swallowing liquids takes extra effort 0   Swallowing solids takes extra effort 2   Swallowing pills takes extra effort 2   Swallowing is painful 1   The pleasure of eating is " affected by my swallowing 0   When I swallow food sticks in my throat 2   I cough when I eat 0   Swallowing is stressful 0   EAT-10 7       Patient Supplied Answers To CSI Questionnaire  Cough Severity Index (CSI) 5/12/2015   My cough is worse when I lie down 0   My coughing problem causes me to restrict my personal and social life 0   I tend to avoid places because of my cough problem 0   I feel embarrassed because of my coughing problem 0   People ask, ''What's wrong?'' because I cough a lot 0   I run out of air when I cough 0   My coughing problem affects my voice 0   My coughing problem limits my physical activity 0   My coughing problem upsets me 0   People ask me if I am sick because I cough a lot 0   CSI Score 0         CURRENT PATIENT COMPLAINTS    Primary: ***    Secondary: ***    Poor voice quality     Worsens with use stress and in the morning evening    Unpredictable Constant Intermittent    Total loss of voice     Voice *** normal    Describes voice as:    Voice breaks/cracks    Shaky/unsteady    Breathy    Gravelly    Raspy    Scratchy    Loss of pitch / pitch range    Change of pitch/ pitch range    Reduced volume    Fatigue; lack of stamina    Increased effort to produce voice    Rates phonatory effort as *** out of 10 (10 is maximum effort) for speech, and *** out of 10 singing    Pain/discomfort with voice    Pain/discomfort ***    Rates pain/discomfort as *** out of 10 (10 being most severe)    Throat irritation    Tightness of throat    Frequent cough    Pain in throat    Pain/Effort with swallow    Frequent throat-clearing    Dry throat    Tickle in throat    Globus sensation/Lump in throat    Mucus in throat     ***    Denies significant dyspnea, dysphagia, or pain    OTHER PERTINENT HISTORY    ***    Please also see Dr. Collado's Haider's dictation    Complex medical Hx; please see Dr. Collado's Haider's dictation    Unknown    Unremarkable    Healthy    Reflux; managed with ***    Allergies;  managed with    Asthma; managed with medications    Anxiety/depression; managed with medications    Sinus problems    Post nasal drainage    Temporomandibular joint disorder    Post menopause    Sleep apnea; uses CPAP    COPD    Cardiac problems    Hypertension; managed with medications    Hearing loss; wears hearing aids    spouse has hearing loss    Otherwise unknown    Otherwise unremarkable    Please refer to chart for additional history    OBJECTIVE FINDINGS  VOICE/ SPEECH/ NON-COMMUNICATIVE LARYNGEAL BEHAVIORS EVALUATION  An evaluation of the *** was accomplished today; salient features are as follows:    Palpation of the laryngeal area shows:    Supple Firm musculature    Tenderness of the thyrohyoid area     Reduced thyrohyoid space     Additional closure of the thyrohyoid space on phonation    Massage of the thyrohyoid area brings relief /is painful    Cough/ Throat clear:    Not observed    Occasional/frequent/constant    Increased following/during    Dry/Wet    Locus of cough/ throat clear: sounds consistent with upper airway/lower airway    Minimal/mild/moderate/marked/severe/profound    Percent of time symptoms are self controlled:     Breathing pattern:    Appears within normal limits and adequate     Inspirations are often inadequate for *** in volume and frequency    Excessive thoracic muscle use pattern    Clavicular elevation on inspiration    Clavicular Thoracic Abdominal muscle use pattern    Shoulder and neck involvement    Noisy /audible /stridorous on inspiration    Shallow    Abdominal contraction at initiation of phonation    Phonation is not coordinated with respiration    Talks to past end of breath stream    No overt tension is evident.    Tension is evident:    Face    Jaw    Tongue base    Neck    Upper body    Neck and shoulders    Voice quality is characterized by    ***    ***    Habitual pitch is ***    Habitual pitch was not formally tested, but is judged to be WNL and  appropriate    Habitual pitch is WNL and appropriate, but sounds higher due to narrow resonance    Habitual pitch is WNL and appropriate, but sounds lower due to rough voice quality backward focus    Loudness is WNL and appropriate for the setting    In a task comparing voiced and voiceless phonemes, ***    Sustained vowels:     Comfortable pitch /a/: ***    High pitch /a/: ***    Low pitch /a/: ***    Comfortable pitch /i/: ***    In a vocal diadochokinesis task, rate and rhythm are ***; glottic coup is ***    Whisper is normal; soft phonation is ***; a yell is ***    A singing task shows voice quality is improved in /consistent between speech and singing    In a pitch glide task to determine pitch range    Highest pitch:    Lowest pitch:    she states today is a typical voice day    she rates her effort as *** out of 10 (10 is maximum effort) for speech; *** out of 10 for singing    GLOBAL ASSESSMENT OF DYSPHONIA: *** /100    CAPE-V Overall Severity:   ***/100    AERODYNAMIC AND ACOUSTIC EVALUATION (CPT 92478 - Laryngeal Function Studies)  ***      LARYNGEAL EXAMINATION    Endoscopic laryngeal examination was performed by:  aDnielle Maloney MD,   MD Deanna Dumas, Ph.D., CCC/SLP  I provided technical support, and provided the protocol of instructions for the patient.  Verbal informed consent was obtained and witnessed prior to this procedure.   Type of exam:   Flexible endoscopy with chip-tip technology and stroboscopy, *** nostril; topical anesthesia with 3% Lidocaine and 0.25% phenylephrine was *** applied.  ***Rigid endoscopy with stroboscopy; topical anesthesia with Benzocaine 20% was *** applied orally.    The laryngeal and pharyngeal structures were evaluated for gross appearance, mobility, function, and focal lesions / abnormalities of the associated mucosa.  ***Stroboscopy was warranted to evaluate closure, symmetry, and vibratory characteristics of the vocal folds.  All findings  were within normal limits with the exception of the following salient features:     ***    This exam shows:    Laryngeal and Vocal Fold Mucosa    Essentially healthy laryngeal mucosa    Arytenoid edema and erythema    Erythema of the medial arytenoid walls     Posterior commissure hypertrophy    No remarkable signs of reflux    *** presence of thickened secretions on the vocal folds and throughout the laryngeal area    Vocal fold mucosa is   o *** dry  o *** erythematous   o *** inflamed  o *** inflamed and irregularly erythematous  o *** and diffusely edematous   o Edematous along the vibratory margins  o Within normal limits, with no lesions and straight vibratory margins  o Otherwise within normal limits, with no lesions and straight vibratory margins    *** swellings on the*** of the vibratory margins of the ***    The swellings result in an *** glottis    ***    Narrow Band Imaging yielded the following: *** no additional information    Neurological and Functional Integrity of the Larynx    Vocal folds are mobile and meet at midline; movement is brisk and symmetric; exam is neurologically normal     *** vocal fold is immobile near the midline at the paramedian position    *** vocal fold is paretic    *** vocal fold comes to midline crosses midline to provide *** glottic closure    Airway is ***    *** arytenoid position is    Fatigue is evident during a task of 20 quickly repeated vowels    Elongation of the vocal folds for pitch increase is ***    On phonation, glottic closure is ***    Bowed Spindle-shaped configuration of the vocal folds during many phonatory tasks    Vocal fold often appears to be at a lower vertical level during phonatory tasks    Tremor of the laryngeal area is evident on phonation    In tasks differentiating voiced from voiceless phonemes, ***      Supraglottic Function and Therapy Probes    *** anterior-posterior constriction of the supraglottic larynx during connected speech    ***  ventricular approximation during phonation;   o ventricular folds appear to be a vibratory source   o Ventricular folds appear damp vibration of the true vocal folds    *** four-way constriction of the supraglottic larynx during connected speech    Hyperadduction of the posterior glottis during phonation    Supraglottic function during singing is improved    Therapy probes show   o Reduction in supraglottic hyperfunction during tasks that involved word-initial voiceless aspirates and labiodental fricatives forward resonance maneuvers  o Improved glottic closure with gentle glottic coup forward resonance exercises    Stroboscopy provided the following additional information:    Stroboscopy shows a normal mucosal wave at habitual pitch, but weak glottic closure and out-of-phase mucosal wave at higher and lower pitch    Stroboscopy was limited due to lack of entrainment, but brief glimpses show    Stroboscopy was accomplished but view of vibratory characteristics was somewhat obscured by supraglottic structures; limited view shows    Glottic closure is weak and transient; open phase predominates    The mucosal wave is interrupted at the site of the lesions    The mucosa is stiff bilaterally on the *** due to    The mucosal wave is asymmetric and out of phase     The mucosal wave can be normal or near normal when entrainment is achieved    Aspects of vocal fold vibration and mucosal wave:    ***    Amplitude of vibration: excursion of the vocal fold body and cover from midline laterally during phonation    ***    Mucosal Wave: the degree of traveling wave that is present on the superior surface of the true vocal fold during phonation     ***    Phase symmetry: the amount of time the vocal folds are vibrating symmetrically; the degree to which they are mirror images of one another (consider amplitude of vibration)     ***    Regularity (Periodicity): Degree to which one phonatory cycle is like the next      ***    Stroboscopy shows:   o     The mucosal wave is within normal limits in periodicity, amplitude, symmetry, and phase    The mucosal wave is within normal limits in periodicity, symmetry, and phase, but limited in amplitude due to    ***  Stroboscopy was not warranted.     *** and I reviewed this laryngeal exam with Ms. Avendaño today, and I provided pertinent explanations, as well as written information:    Endoscopic findings are consistent with audio-perceptual assessment and patient Hx/complaints.    her symptoms are accounted for by ***     Because there appears to be a functional component to her symptoms, she would most likely benefit from functional speech therapy.        THERAPEUTIC ACTIVITIES  Today Ms. Avendaño participated in the following therapeutic activities:    Asked many questions about the nature of her symptoms, and I answered all of these thoroughly.    [place] new exercises to promote ***.    Practiced techniques for ***.    I provided instruction for ***.    [place] concepts of ***.    In response to her questions, I provided explanations ***.    [place] concepts of applying ice, heat, and massage to the tender areas of her neck, in order to reduce pain.    [place] concepts and technqiues for using saline and plain-water gargling nasal irrigation steam guaifenesin to reduce the thickened secretions / laryngeal irritation.    [place] concepts and techniques for improving topical and systemic hydration     [place] concepts and techniques for reduction of vocal fold impact.    [place] concepts and strategies managing laryngopharyngeal reflux disorder, to reduce laryngeal inflammation.    I provided instruction for techniques and strategies to reduce the chronic cough/throat clearing  o alternative behaviors such as voiceless glottic coup, humming, swallowing, etc. were taught  o strategies for reducing mucosal irritation were taught    I instructed her as to the use of an amplification  device.    [place] techniques for optimal breathing technique.    [place] concepts of exercises to recover singing technique, most especially using easy pitch glides and exercises to connect singing voice to speaking voice.    [place] exercises for tissue mobilization to prevent break down scarring.    [place] a regimen for post-operative voice use and care to ensure optimal healing.    [place] an optimal practice regimen for rehabilitation exercises.    Demonstrated previous exercises.  o demonstrated improved technique  o appropriate redirection provided  o instruction provided for increased level of complexity/difficulty    [place] exercises for upper body relaxation during phonation.  o demonstrated moderate upper body tension  o good self-awareness  o improvement in voice quality during exercises    [place] exercises for optimal respiratory mechanics for speech and singing.  o I provided explanation of the anatomy and physiology of respiration for speech and singing; she found this to be helpful  o she demonstrated clavicular/neck/shoulder involvement in inhalation  o demonstrated difficulty allowing abdominal relaxation for inhalation  o demonstrated acceptable abdominal relaxation for inhalation  o with guidance, learned improved abdominal relaxation for inhalation  o learned techniques for optimal airflow on exhalation  o practiced in prone and supine positions on the massage table; this was helpful  o [place] a respiratory pacing exercise; this was helpful.  o good learning, but will need practice  o acceptable  minimal  improvement in airflow and respiratory mechanics    [place] exercises to add phonation to the optimal flowing airstream.  o Semi-occluded vocal tract exercises with a *** were most facilitating  o *** were most facilitating  o progressed from neutral syllables to  o Instructed to use as a voice warm up, cool down, coordination of breath flow with phonation, and for tissue  mobilization.  o good learning, but will need practice     [place] exercises for improved glottic closure.  o able to produce acceptable glottic coup    [place] exercises for improved airflow during phonation.  o speech material with aspirate onsets was facilitating  o speech material with *** was facilitating  o Instructed at the word and phrase level.  o learned techniques to reduce glottal jenkins and improve breath flow    [place] exercises to experience a more forward sensation during phonation.  o speech material with nasal continuants was facilitating  o speech material that elicits a high, forward tongue position was facilitating  o able to recognize improvement in quality and comfort  o able to progress to level of   o good learning, but will need practice    [place] techniques to use an optimal pitch range in speech.  o today s pitch range:   o able to maintain in     [place] exercises to maintain the improved airflow and forward focus in singing.  o did a variety of glides, scales, and arpeggio patterns on a variety of neutral syllables  o learned how to apply the concepts to repertoire    Developed a checklist of factors.    [place] concepts of post-operative voice use and care, to optimize healing.    [place] exercises to improve her perceived loudness in noisy situations, without placing more burden on the larynx.    [place] a regimen for optimal warm-up and cool-down.    Recorded a pitch glide exercise to monitor pitch range on a daily basis.    [place] concepts of an optimal regimen for practice.  o she should use an interval schedule of practice, with brief periods of practice frequently throughout each day  o [place] concepts of volitional practice to facilitate motor learning.    I provided an after visit summary to help facilitate practice.    An audio recording of today's therapeutic activities was provided, to facilitate practice.    ASSESSMENT / PLAN  IMPRESSIONS:  {[facility] E[facility]-ICD-10  "CODES:971080}  {[facility] ENT-ICD-10 CODES:743599}  {[facility] ENT-ICD-10 CODES:049839}.      Laryngeal evaluation demonstrated ***    {DYSPHONIA:483217983}  STIMULABILITY: results of therapy probes during perceptual and laryngeal evaluation demonstrate improvement with {Therapy Probe Response:177388}  RECOMMENDATIONS:     {Therapy recommendations:496994}    She demonstrates a {PROGNOSIS:632772351::\"Good\"} prognosis for improvement given adherence to therapeutic recommendations. Therapeutic     Positive indicators: ***    Negative indicators: ***      IMPRESSIONS/ RECOMMENDATIONS/ PLAN  IMPRESSIONS / RECOMMENDATIONS  Based on today's evaluation and laryngeal examination, it appears that:    Dysphonia is accounted for by the vocal fold lesions, and resulting aberrations in vibratory characteristics and mucosal wave    Dysphonia is accounted for by the stiffness/ irregularity of the vocal folds, and resulting poor mucosal wave    Dysphonia is accounted for by the poor glottic closure, and resulting poor vocal fold entrainment    Dysphonia, discomfort, fatigue and effort are accounted for by the hyperfunction /poor function of the intrinsic and extrinsic laryngeal musculature    Dysphonia is accounted for by the immobility of the vocal fold, and resulting poor glottic closure    Cough/throat clear are accounted for by hypersensitivity of the larynx and pharynx as evidenced by case history, patient complaints and absence of other organic findings; hypersensitivity is compounded by imbalance in the function of the intrinsic and extrinsic laryngeal musculature during connected speech    Cough/throat clear are accounted for by apparent mucosal irritation    A course of speech therapy is recommended to:  o Optimize vocal technique  o Improve voice quality  o Promote reduced discomfort/ effort/ fatigue  o Promote reduction of vocal fold impact to help resolve the lesions  o Optimize surgical results  o help reduce laryngeal irritation " and thereby reduce cough throat clear    she is entirely amenable to this plan        We briefly began therapy today, working on strategies to improve vocal health and technique    No therapy is planned, therefore there are no goals and Ms. Avendaño is discharged from this service.    I will see Ms. Avendaño as Dr. *** deems appropriate.    RATIONALE: Current level of functioning is:  CH - 0 percent impaired, limited, or restricted  CI - At least 1 percent but less than 20 percent impaired, limited, or restricted  CJ - At least 20 percent but less than 40 percent impaired, limited, or restricted  CK - At least 40 percent but less than 60 percent impaired, limited, or restricted  CL - At least 60 percent but less than 80 percent impaired, limited, or restricted  CM - At least 80 percent but less than 100 percent impaired, limited, or restricted  CN - 100 percent impaired, limited, or restricted   based on Ms. Avendaño's extent of dysphonia, extent of impact on function, extent of discomfort, level of effort.    TREATMENT PLAN  Speech therapy    DURATION/FREQUENCY OF TREATMENT  Six weekly, one-hour sessions, with two monthly one-hour follow-up sessions  A single session, accomplished today    PROGNOSIS  *** prognosis for improvement in *** with speech therapy and regular practice of therapeutic activities.    BARRIERS TO LEARNING/TEACHING AND LEARNING NEEDS  None/Unremarkable    DISCHARGE SUMMARY  Ms. Avendaño has had a single session of evaluation and therapy today.  Based on the evaluation, the functional therapy provided was considered medically necessary to meet the goal of ***.  No further intervention is deemed necessary *** will be provided in this facility.  Therefore, she is discharged with the goals listed below.    GOALS  Patient goal:    {GOALS:174505894}    Short-term goal(s): Within the first 4 sessions, Ms. Avendaño will:  1.  [facility]    Long-term goal(s): In *** months, Ms. Avendaño will:  1.   "{LONG TERM GOALS:964011536}    ***  G-Codes:  {[facility] ENT-G-CODES LIST:184373}  Certification period: June 18, 2018 - ***    This treatment plan was developed with the patient who agreed with the recommendations.    PRIMARY ICD-10 code:  {[facility] E[facility]-ICD-10 CODES:678172216}  SECONDARY ICD-10 code:  {[facility] E[facility]-ICD-10 CODES:119269534}   TERTIARY ICD-10 code:  {[facility] E[facility]-ICD-10 CODES:420025793}    TOTAL SERVICE TIME: *** minutes  {LOS:804252311::\"NO CHARGE FACILITY FEE (37341)\"}      Deanna Turner, Ph.D., [facility]-SLP  Speech-Language Pathologist  Director, [facility]  [phone]            "

## 2018-06-18 NOTE — PROGRESS NOTES
Dear Ms Tariq:    I had the pleasure of meeting Shelia Avendaño in consultation at the [facility] of the [facility] at your request, for evaluation of throat concerns. The note from our visit follows. Speech recognition software may have been used in the documentation below; input is reviewed before signature to the best of my ability. I appreciate the opportunity to participate in the care of this pleasant patient.    Please feel free to contact me with any questions.    Sincerely yours,    Danielle Maloney M.D., M.P.H.  , Laryngology  Director, [facility]  Otolaryngology- Head & Neck Surgery  [phone]          =====    History of Present Illness:   Shelia Avendaño is a pleasant 57-year-old female with a history of Lyme who presents with a five to six month history of throat concerns. Symptoms include throat tightness, mucus in throat and frequent throat-clearing.      Voice  She was seen by Dr. Collado and Dr. 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 At that time, she had done well with a course of speech therapy.  Her current symptoms are quite different, her voice does still have some muscle tension in her opinion, but this is not a significant concern      Swallowing  She has had some difficulty with swallowing over the past few years.  She recalls having bad bronchitis in January/February just before the problem began.  She does need to cut her pills in half sometimes to swallow.  No recent swallow studies.  She does experience increased swallowing effort with pills.      Cough/Throat-clearing  She developed a severe cough in the context of bronchitis earlier this year as noted above.  This persisted for about six weeks, and thereafter she had a chronic sensation of postnasal drainage which provoked very frequent throat clearing.  She then went to [place] on vacation, and had  complete resolution of her symptoms for about two weeks.  Since she returned, she has had some partial recurrence of her symptoms just over the past week, but is still doing much better overall than she had been prior to her travel.  She reports that they are in the process of redoing the kitchen, and the smells and dust from the construction materials do seem to bother her a little bit.      Breathing  No concerns.       Throat discomfort  She was sent to see us for the sensation of persistent mucus in her throat. She saw an otolaryngologist before some recent travel, and laryngoscopy was reportedly normal. The records for this visit are not available to me. This has improved considerably, and was essentially [place]. Since returning she has had some partial recurrence of her symptoms but is still doing much better overall.      Reflux-type symptoms  She experiences heartburn/indigestion rarely. She is not taking reflux medications. A Restech was negative in 2010. She was seen at [facility] in May 2018, and upper endoscopy with pH testing is planned.      Prior EPIC and outside records were reviewed for this visit.       MEDICATIONS:     Current Outpatient Prescriptions   Medication Sig Dispense Refill     ACYCLOVIR PO Take 100 mg by mouth daily        amitriptyline (ELAVIL) 10 MG tablet Take  by mouth At Bedtime.       calcium-magnesium (CALMAG) 500-250 MG TABS Take 1 tablet by mouth daily       fluticasone (FLONASE) 50 MCG/ACT spray Spray 1-2 sprays into both nostrils daily 1 Bottle 0     Levothyroxine Sodium (SYNTHROID PO) Take 188 mcg by mouth daily . 1 mg daily       Liothyronine Sodium (CYTOMEL PO) Take  by mouth. 1/2 tab daily       loratadine-pseudoePHEDrine (CLARITIN-D 24-HOUR)  MG per 24 hr tablet Take 1 tablet by mouth daily       TAMOXIFEN CITRATE PO          ALLERGIES:  No Known Allergies    PAST MEDICAL HISTORY:   Past Medical History:    Diagnosis Date     Adjustment disorder with mixed anxiety and depressed mood 10/12/2011     Closed fracture of one or more phalanges of foot 1/19/2012     HSV infection 11/8/2017     Hypothyroid 10/8/2010     Osteoarthrosis, hand 7/20/2006    Overview:  LW Onset:  1990s ; DJD Hand     Osteopenia      Sinus problem         PAST SURGICAL HISTORY:   Past Surgical History:   Procedure Laterality Date     TONSILLECTOMY         HABITS/SOCIAL HISTORY:    Social History   Substance Use Topics     Smoking status: Never Smoker     Smokeless tobacco: Never Used     Alcohol use No         FAMILY HISTORY:    Family History   Problem Relation Age of Onset     Prostate Cancer Father 55     Thyroid Disease Mother        REVIEW OF SYSTEMS:  The patient completed a comprehensive 11 point review of systems (below), which was reviewed. Positives are as noted below; pertinent findings are as noted in the HPI.     Patient Supplied Answers to Review of Systems  [facility] ROS 6/18/2018   Constitutional Problems with sleep   Ears, Nose, Throat Ringing/noise in ears, Nasal congestion or drainage, Sore throat, Trouble swallowing   Musculoskeletal Sore or stiff joints       PHYSICAL EXAMINATION:  General: The patient was alert and conversant, and in no acute distress.    Eyes: PERRL, conjunctiva and lids normal, sclera nonicteric.  Nose: Anterior rhinoscopy: no gross abnormalities. no  bleeding; no  mucopurulence; septum grossly normal, mild mucoid drainage and/or crusting.  Oral cavity/oropharynx: No masses or lesions. Dentition in fair condition. Floor of mouth and oral tongue soft to palpation. Tongue mobility and palate elevation intact and symmetric.  Ears: Normal auricles, external auditory canals bilaterally. Visualized portions of tympanic membranes normal bilaterally.   Neck: No palpable cervical lymphadenopathy. There was mild tenderness to palpation of the thyrohyoid space, which was narrow. No obvious thyroid abnormality. Landmarks  palpable.  Resp: Breathing comfortably, no stridor or stertor.  Neuro: Symmetric facial function. Other cranial nerves as documented above.  Psych: Normal affect, pleasant and cooperative.  Voice/speech: No significant dysphonia.  Extremities: No cyanosis, clubbing, or edema of the upper extremities.    Intake scores  Total Score for Last Patient-Answered VHI Questionnaire  VHI Total Score 6/18/2018   VHI Total Score 3     Total Score for Last Patient-Answered EAT Questionnaire  EAT Total Score 6/18/2018   EAT Total Score 3     Total Score for Last Patient-Answered CSI Questionnaire  CSI Total Score 6/18/2018   CSI Total Score 2       PROCEDURE:   Flexible fiberoptic laryngoscopy  Indications: This procedure was warranted to evaluate the patient's laryngeal anatomy and function. Risks, benefits, and alternatives were discussed.  Description: After written informed consent was obtained, a time-out was performed to confirm patient identity, procedure, and procedure site. Topical 3% lidocaine with 0.25% phenylephrine was applied to the nasal cavities. I performed the endoscopy and no complications were apparent.  Performed by: Danielle Maloney MD MPH  SLP: Deanna Turner, PhD, CCC-SLP   Findings: Normal nasopharynx. Normal base of tongue, valleculae, and epiglottis. The right true vocal fold demonstrated normal mobility. The left true vocal fold demonstrated normal mobility. The medial edges of the vocal folds appeared smooth and straight. No focal mucosal lesions were observed on the true vocal folds. Glissade produced appropriate elongation. There was mild to moderate supraglottic recruitment with connected speech. Mucosa of the false vocal folds, aryepiglottic folds, piriform sinuses, and posterior glottis unremarkable. Airway was patent. Scattered supraglottic lymphoid tissue, does not appear inflamed.      IMPRESSION AND PLAN:   Shelia Avendaño presents with irritable larynx syndrome following bronchitis and  possible sinusitis earlier this year.  She also has an ongoing workup with [facility] for acid reflux.     I recommended speech therapy as initial primary management, with goals including reducing laryngeal irritability, improving laryngeal efficiency and improving respiratory/phonatory coordination.  We discussed that if her dysphagia persists despite therapy, a next step would be a formal swallow evaluation with a video fluoroscopy swallow study.     She will return as needed. I appreciate the opportunity to participate in the care of this pleasant patient.

## 2018-06-19 PROBLEM — J38.7 IRRITABLE LARYNX SYNDROME: Status: ACTIVE | Noted: 2018-06-19

## 2018-06-19 NOTE — PROGRESS NOTES
"[facility] VOICE [facility]  Steve Collado Jr., M.D., F.A.C.S.  Danielle Maloney M.D., M.P.H.  Deanna Turner, Ph.D., CCC/SLP  Ritika Davidson M.M. (voice), M.A., CCC/SLP  Han Pierre M.M. (voice), M.ADE., CCC/SLP    [facility]  VOICE/SPEECH/BREATHING EVALUATION AND LARYNGEAL EXAMINATION REPORT    Patient: Shelia Avendaño  Date of Visit: 6/18/2018    CHIEF COMPLAINT:  Phlegm in throat (Irritable Larynx Syndrome)    HISTORY  Shelia Avendaño was seen for evaluation and laryngeal examination in conjunction with a visit to Dr. Maloney.  Please refer to Dr. Maloney s dictation for a more complete history and impressions. Salient details of her history are as follows:    Ms. Avendaño is a 57 year old with a history of dysphonia and related problems.    Seen by Dr. Collado and myself in 2015; treated for muscle tension dysphonia    Current symptoms began in January/February this year, with a 6 week bout of bronchitis with very severe coughing    Symptoms suddenly changed from the bronchitis symptoms to a sensation of \"a flood of mucus pouring down my throat\"    Need to throat clear very frequently for 4 months, as well as sensation of need to swallow phlegm    Cough seemed severe, but \"not really productive\"    Sensation of a particular site on the right side of throat where phlegm would collect    extensive work-up; several courses of antibiotics for coughing/phlegm    Eventually saw unknown ENT physician; work-up showed no sinus infection; recommended against a 3rd course of antibiotics     Seen at [facility]; referred here because symptoms not explained by reflux    Complete resolution of symptoms while on vacation at beach in [place]; some return of symptoms while continuing vacation inland in [facility]    Currently much better than previously, and stable for a week    Still feels some residual tightness with swallow; feels there is still neck muscle tension, but voice is not a concern    DIAGNOSIS/REASON FOR " "REFERRAL  Irritable larynx Syndrome/ Evaluate, perform laryngeal exam, treat as appropriate    Patient Supplied Answers To [facility] Intake General Questionnaire  [facility] Intake - General Form Review 6/18/2018   Are you having any of these symptoms? Throat tightness, Mucus in throat, Frequent throat-clearing   Do you use caffeine? No   How many oz. of non-caffeinated fluid do you typically drink in a day? 80   How often do you experience heartburn, indigestion, chest pain, stomach acid coming up, and/or tasting acid in your mouth or throat? Rarely   If you marked \"Other\", please comment: past spring about a couple times a month   Have reflux medications been recommended to you? No   If yes, are you taking them regularly? N/A   Swallowing: Yes   Cough and/or throat-clearing: Yes   A different problem, not listed above: Yes   Other physicians/health care providers who should receive a copy of today's note (please provide first and last name(s), city): lisa buck, [facility]       Patient Supplied Answers To [place] Intake Throat Discomfort Questionnaire  No flowsheet data found.    Patient Supplied Answers To [facility] Intake Cough/Throat-Clearing Questionnaire  [facility] Intake - Cough/Throat-Clearing Review 6/18/2018   Was there anything unusual about the time this cough/throat-clearing problem was first noticed (such as illness, accident, surgery, etc)? If yes, please describe.  You have 200 characters to respond.  We will ask for more detail at your visit. since february   What do you think caused this cough/throat-clearing problem? excessive mucus in throat   How quickly did the cough/throat-clearing problem develop? Suddenly   How do the cough/throat-clearing symptoms vary? Worse in the PM, Most of the time   Over time, how has the cough/throat-clearing problem changed? Better       Patient Supplied Answers To [facility] Intake Swallow Questionnaire  [facility] Intake - Swallow Review 6/18/2018   How long have you had " this swallowing problem? a few years, this spring exaceebated by excessive mucus whichcame onsuddenly   How quickly did the swallowing problem develop? Gradually   Did any specific event(s) occur around the time the swallowing problem was first noticed (such as illness, accident, surgery, etc)?  If yes, please describe? You have 200 characters to respond.  We will ask for more detail at your visit. bad brochitis january feb ust before mucus problem develope   Have you ever had a swallowing problem before?  If yes, when? rexently mucus  previouslyconstricted throat   Do you feel a lump in your throat? No   Over time, how has the swallowing problem changed? Better   What is your current type of food intake? Regular   What is your current type of liquid intake? Regular - thin liquids   What health care providers have you seen for this swallowing problem?  Who and when? current mucus problem family practice dr, ent at [facility], gastroenterology. pastlong term swalowing ayanna [facility] and speech therapist   Did you receive any therapy or treatment for this swallowing problem?  If so, please describe briefly. past problem yes not current problem   Have you changed your diet because of your swallowing problem?  If yes, how? cut some pills in half so dont get stuck   Have you had a prior swallow study? Yes   If yes, how long ago was your last swallow study? not sure but may have had this at some point   Have you had a pneumonia or other severe chest infection within the past 6 months? Not sure   For your swallowing problem, is there anything else you'd like to tell us? main concern now is excessive mucus in the throat for four months after bronchitis resolved i february.  rexently symltoms went away and returned at a minor level       Patient Supplied Answers To VHI Questionnaire  Voice Handicap Index (VHI-10) 6/18/2018   My voice makes it difficult for people to hear me 1  "  People have difficulty understanding me in a noisy room 2   My voice difficulties restrict my personal and social life.  0   I feel left out of conversations because of my voice 0   My voice problem causes me to lose income 0   I feel as though I have to strain to produce voice 0   The clarity of my voice is unpredictable 0   My voice problem upsets me 0   My voice makes me feel handicapped 0   People ask, \"What's wrong with your voice?\" 0   VHI-10 3       Patient Supplied Answers To EAT Questionnaire  Eating Assessment Tool (EAT-10) 5/12/2015   My swallowing problem has caused me to lose weight 0   My swallowing problem interferes with my ability to go out for meals 0   Swallowing liquids takes extra effort 0   Swallowing solids takes extra effort 2   Swallowing pills takes extra effort 2   Swallowing is painful 1   The pleasure of eating is affected by my swallowing 0   When I swallow food sticks in my throat 2   I cough when I eat 0   Swallowing is stressful 0   EAT-10 7       Patient Supplied Answers To CSI Questionnaire  Cough Severity Index (CSI) 5/12/2015   My cough is worse when I lie down 0   My coughing problem causes me to restrict my personal and social life 0   I tend to avoid places because of my cough problem 0   I feel embarrassed because of my coughing problem 0   People ask, ''What's wrong?'' because I cough a lot 0   I run out of air when I cough 0   My coughing problem affects my voice 0   My coughing problem limits my physical activity 0   My coughing problem upsets me 0   People ask me if I am sick because I cough a lot 0   CSI Score 0       CURRENT PATIENT COMPLAINTS    Primary: phlegm in throat; throat-clearing    Secondary: swallowing problems    Denies significant dyspnea, dysphagia, dysphonia, or pain    OTHER PERTINENT HISTORY    Please refer to chart for additional history    OBJECTIVE FINDINGS  VOICE/ SPEECH/ NON-COMMUNICATIVE LARYNGEAL BEHAVIORS EVALUATION  An evaluation of the voice " and throat-clear was accomplished today; salient features are as follows:    Cough/ Throat clear:    Not observed    Breathing pattern:    Inspirations are often barely adequate for speech in volume    Tension is evident:    Neck and shoulders    Voice quality is characterized by    Mildly pressed, but clear, and well WNL    Habitual pitch was not formally tested, but is judged to be WNL and appropriate    Loudness is WNL and appropriate for the setting    GLOBAL ASSESSMENT OF DYSPHONIA: 3 /100    CAPE-V Overall Severity:   6/100      LARYNGEAL EXAMINATION    Endoscopic laryngeal examination was performed by:  Danielle Maloney MD,   I provided technical support, and provided the protocol of instructions for the patient.  Type of exam:   Flexible endoscopy with chip-tip technology     The laryngeal and pharyngeal structures were evaluated for gross appearance, mobility, function, and focal lesions / abnormalities of the associated mucosa.    All findings were within normal limits with the exception of the following salient features:     Moderate presence of thickened clear secretions throughout the laryngeal area    Mild secretions stranding on vocal folds    Slight spindle configuration to the glottis during phonatory tasks    Mild 4-way constriction to the supraglottic larynx during speech and singing    Dr. Maloney and I reviewed this laryngeal exam with Ms. Avendaño today, and I provided pertinent explanations, as well as written information:    Endoscopic findings are consistent with audio-perceptual assessment and patient Hx/complaints.    her symptoms are accounted for by moderate presence of thickened secretions; it is likely this was worse when she was more symptomatic    Her history is consistent with irritable larynx syndrome; it may be worthwhile to undergo a short course of therapy to learn techniques and strategies to reduce irritability, including strategies for reduced muscle hyperfunction during  speech     ASSESSMENT / PLAN  IMPRESSIONS:  J38.7 (Irritable Larynx Syndrome)  R49.0 (Dysphonia)      Laryngeal evaluation demonstrated mild findings consistent with laryngeal irritability  Cough/throat clear are accounted for by the hypersensitivity of the larynx and pharynx as evidenced by case history, patient complaints and minimal organic findings; hypersensitivity may be compounded by imbalance in the function of the intrinsic and extrinsic laryngeal musculature during connected speech  She may benefit from a course of functional speech therapy to help her reduce the laryngeal irritability  RECOMMENDATIONS:     A course of speech therapy is recommended to help reduce throat clear and mucosal irritation.    She demonstrates a Good prognosis for improvement given adherence to therapeutic recommendations. Therapeutic     Positive indicators: commitment to process, previous success    Negative indicators: none    TREATMENT PLAN  Speech therapy    DURATION/FREQUENCY OF TREATMENT  Four bi-weekly, one-hour sessions, with two monthly one-hour follow-up sessions    GOALS  Patient goal:    To reduce her laryngeal irritation to acceptable levels    Long-term goal(s): In 6 months, Ms. Avendaño will:  1.  Report a week of typical activities, in which a sense of laryngeal irritation does not exceed a level of 2 out of 10, 80% of the time    This treatment plan was developed with the patient who agreed with the recommendations.    PRIMARY ICD-10 code:  J38.7 (Irritable Larynx Syndrome)  SECONDARY ICD-10 code:  R49.0 (Dysphonia)     TOTAL SERVICE TIME: 45 minutes  EVALUATION OF VOICE AND RESONANCE: (62556): 45 minutes    NO CHARGE FACILITY FEE (00246)      Deanna Turner, Ph.D., [facility]-SLP  Speech-Language Pathologist  Director, [facility]  [phone]

## 2018-07-12 ENCOUNTER — OFFICE VISIT (OUTPATIENT)
Dept: FAMILY MEDICINE | Facility: CLINIC | Age: 58
End: 2018-07-12
Payer: COMMERCIAL

## 2018-07-12 VITALS
TEMPERATURE: 98 F | RESPIRATION RATE: 16 BRPM | HEART RATE: 79 BPM | DIASTOLIC BLOOD PRESSURE: 71 MMHG | WEIGHT: 139.5 LBS | OXYGEN SATURATION: 98 % | BODY MASS INDEX: 22.96 KG/M2 | SYSTOLIC BLOOD PRESSURE: 103 MMHG

## 2018-07-12 DIAGNOSIS — A69.20 LYME DISEASE: Primary | ICD-10-CM

## 2018-07-12 PROCEDURE — 99213 OFFICE O/P EST LOW 20 MIN: CPT | Performed by: NURSE PRACTITIONER

## 2018-07-12 RX ORDER — DOXYCYCLINE HYCLATE 100 MG
100 TABLET ORAL 2 TIMES DAILY
Qty: 42 TABLET | Refills: 0 | Status: SHIPPED | OUTPATIENT
Start: 2018-07-12 | End: 2018-08-02

## 2018-07-12 RX ORDER — FLUCONAZOLE 150 MG/1
150 TABLET ORAL ONCE
Qty: 1 TABLET | Refills: 0 | Status: SHIPPED | OUTPATIENT
Start: 2018-07-12 | End: 2018-07-12

## 2018-07-12 NOTE — MR AVS SNAPSHOT
After Visit Summary   7/12/2018    Shelia Avendaño    MRN: [id number]           Patient Information     Date Of Birth          1960        Visit Information        Provider Department      7/12/2018 3:15 PM Daniat Palacios NP [facility]        Today's Diagnoses     Lyme disease    -  1       Follow-ups after your visit        Your next 10 appointments already scheduled     Aug 03, 2018  3:30 PM CDT   (Arrive by 3:15 PM)   RETURN SLP VOICE with LANDEN Lind [facility] Voice ([facility])    [address] [place] [id number] [phone]              Who to contact     If you have questions or need follow up information about today's clinic visit or your schedule please contact [facility] directly at [phone].  Normal or non-critical lab and imaging results will be communicated to you by MyChart, letter or phone within 4 business days after the clinic has received the results. If you do not hear from us within 7 days, please contact the clinic through MyChart or phone. If you have a critical or abnormal lab result, we will notify you by phone as soon as possible.  Submit refill requests through MuteButton or call your pharmacy and they will forward the refill request to us. Please allow 3 business days for your refill to be completed.          Additional Information About Your Visit        MyChart Information     MuteButton gives you secure access to your electronic health record. If you see a primary care provider, you can also send messages to your care team and make appointments. If you have questions, please call your primary care clinic.  If you do not have a primary care provider, please call [phone] and they will assist you.        Care EveryWhere ID     This is your Care EveryWhere ID. This could be used by other organizations to access your [facility] medical records  [id number]         Your Vitals Were     Pulse Temperature Respirations Last Period Pulse Oximetry Breastfeeding?    79 98  F (36.7  C) (Oral) 16 08/28/2013 (LMP Unknown) 98% No    BMI (Body Mass Index)                   22.96 kg/m2            Blood Pressure from Last 3 Encounters:   07/12/18 103/71   06/18/18 113/74   04/10/18 109/66    Weight from Last 3 Encounters:   07/12/18 139 lb 8 oz (63.3 kg)   06/18/18 139 lb (63 kg)   04/10/18 140 lb (63.5 kg)              Today, you had the following     No orders found for display         Today's Medication Changes          These changes are accurate as of 7/12/18  5:31 PM.  If you have any questions, ask your nurse or doctor.               Start taking these medicines.        Dose/Directions    doxycycline 100 MG tablet   Commonly known as:  VIBRA-TABS   Used for:  Lyme disease   Started by:  Danita Palacios, NP        Dose:  100 mg   Take 1 tablet (100 mg) by mouth 2 times daily for 21 days   Quantity:  42 tablet   Refills:  0       fluconazole 150 MG tablet   Commonly known as:  DIFLUCAN   Used for:  Lyme disease   Started by:  Danita Palacios, NP        Dose:  150 mg   Take 1 tablet (150 mg) by mouth once for 1 dose   Quantity:  1 tablet   Refills:  0            Where to get your medicines      These medications were sent to [facility] - [place], [place] - [address]  [address], [place] [address]     Phone:  [phone]     doxycycline 100 MG tablet         Some of these will need a paper prescription and others can be bought over the counter.  Ask your nurse if you have questions.     Bring a paper prescription for each of these medications     fluconazole 150 MG tablet                Primary Care Provider Office Phone # Fax #    Billy Laboy [phone]       [address] [place] [address]        Equal Access to Services     RAMÓN SANTOS AH: Mia Spence, jocelyne torre, jean cervantes  karl azarmicah marcelinojuanis goncalvesaan ah. So [facility] [phone].    ATENCIÓN: Si prateek evans, tiene a johnston disposición servicios gratuitos de asistencia lingüística. Norman al [phone].    We comply with applicable federal civil rights laws and [place] laws. We do not discriminate on the basis of race, color, national origin, age, disability, sex, sexual orientation, or gender identity.            Thank you!     Thank you for choosing [facility]  for your care. Our goal is always to provide you with excellent care. Hearing back from our patients is one way we can continue to improve our services. Please take a few minutes to complete the written survey that you may receive in the mail after your visit with us. Thank you!             Your Updated Medication List - Protect others around you: Learn how to safely use, store and throw away your medicines at www.disposemymeds.org.          This list is accurate as of 7/12/18  5:31 PM.  Always use your most recent med list.                   Brand Name Dispense Instructions for use Diagnosis    ACYCLOVIR PO      Take 100 mg by mouth daily        amitriptyline 10 MG tablet    ELAVIL     Take  by mouth At Bedtime.        calcium-magnesium 500-250 MG Tabs per tablet    CALMAG     Take 1 tablet by mouth daily        CYTOMEL PO      Take  by mouth. 1/2 tab daily        doxycycline 100 MG tablet    VIBRA-TABS    42 tablet    Take 1 tablet (100 mg) by mouth 2 times daily for 21 days    Lyme disease       fluconazole 150 MG tablet    DIFLUCAN    1 tablet    Take 1 tablet (150 mg) by mouth once for 1 dose    Lyme disease       fluticasone 50 MCG/ACT spray    FLONASE    1 Bottle    Spray 1-2 sprays into both nostrils daily    Viral URI with cough       loratadine-pseudoePHEDrine  MG per 24 hr tablet    CLARITIN-D 24-hour     Take 1 tablet by mouth daily        SYNTHROID PO      Take 188 mcg by mouth daily . 1 mg daily        TAMOXIFEN  CITRATE PO

## 2018-07-12 NOTE — PROGRESS NOTES
SUBJECTIVE:   Shelia Avendaño is a 57 year old female who presents to clinic today for the following health issues:      Possible Tick bite       Duration: noticed it 1 week ago     Description (location/character/radiation): rash on upper left chest     Accompanying signs and symptoms: headache x 2 days ago, aches, no fevers    History (similar episodes/previous evaluation): she has a history of chronic Lymes and was treated     Therapies tried and outcome: tylenol  For headache - is not helping     She was in the garden about 1-1/2 weeks ago and was pulling weeds.  A few days later she developed a red rash on her chest.            Problem list and histories reviewed & adjusted, as indicated.  Additional history: as documented        Reviewed and updated as needed this visit by clinical staff  Tobacco  Allergies  Meds  Med Hx  Surg Hx  Fam Hx  Soc Hx      Reviewed and updated as needed this visit by Provider         ROS:  CONSTITUTIONAL: NEGATIVE for fever, chills, change in weight  INTEGUMENTARY/SKIN: see HPI  ENT/MOUTH: NEGATIVE for ear, mouth and throat problems  RESP: NEGATIVE for significant cough or SOB  CV: NEGATIVE for chest pain, palpitations or peripheral edema  CV: NEGATIVE for chest pain, palpitations or peripheral edema  MUSCULOSKELETAL: see HPI  NEURO: NEGATIVE for weakness, dizziness or paresthesias    OBJECTIVE:     /71  Pulse 79  Temp 98  F (36.7  C) (Oral)  Resp 16  Wt 139 lb 8 oz (63.3 kg)  LMP 08/28/2013 (LMP Unknown)  SpO2 98%  Breastfeeding? No  BMI 22.96 kg/m2  Body mass index is 22.96 kg/(m^2).  GENERAL: healthy, alert and no distress  SKIN: approx 4 cm in diameter annular erythematous patch with central clearing on left anterior chest  PSYCH: mentation appears normal, affect normal/bright        ASSESSMENT/PLAN:             1. Lyme disease  Erythema migrans and other symptoms of early Lyme disease.  Discussed the use and indication of this medication as well as  potential side effects.   - doxycycline (VIBRA-TABS) 100 MG tablet; Take 1 tablet (100 mg) by mouth 2 times daily for 21 days  Dispense: 42 tablet; Refill: 0  - fluconazole (DIFLUCAN) 150 MG tablet; Take 1 tablet (150 mg) by mouth once for 1 dose  Dispense: 1 tablet; Refill: 0        Danita Palacios NP  [facility]

## 2018-08-03 ENCOUNTER — OFFICE VISIT (OUTPATIENT)
Dept: OTOLARYNGOLOGY | Facility: CLINIC | Age: 58
End: 2018-08-03
Payer: COMMERCIAL

## 2018-08-03 DIAGNOSIS — J38.7 IRRITABLE LARYNX SYNDROME: Primary | ICD-10-CM

## 2018-08-03 DIAGNOSIS — R49.0 DYSPHONIA: ICD-10-CM

## 2018-08-03 NOTE — PROGRESS NOTES
[facility] VOICE [facility]  Steve Collado Jr., M.D., F.A.C.S.  Danielle Maloney M.D., M.P.H.  Deanna Turner, Ph.D., CCC/SLP  Ritika Davidson M.M. (voice), M.A., CCC/SLP  Han Pierre M.M. (voice), M.A., [facility]/SLP    [facility] VOICE [facility]  VOICE/SPEECH/BREATHING THERAPY PROGRESS REPORT    Patient: Shelia Avendaño  Date of Service: 8/3/2018  Date of Last Service: 6/18/18    I had the pleasure of seeing Ms. Avendaño today, for the 1st therapy session (CPT code 32947).  She was referred by Dr. Maloney, for medically necessary speech therapy to address a diagnosis of:  J38.7 (Irritable Larynx Syndrome)   R49.0 (Dysphonia)  Please refer to her initial evaluation report of 6/18/18 for complete details regarding diagnostic findings.    PROGRESS SINCE LAST SESSION  Ms. Avendaño was seen for evaluation on 6/18/18.  At that time, it was determined that she would benefit from a course of speech therapy to address the above diagnosis.  Since then, she states she has not had any substantive change in her symptoms, which is expected, as no therapeutic suggestions were made at the time.    Ms. Avendaño also states that:    She still feels the sensation that there is something flowing in the back of her throat, though the sensation of mucus flow is somewhat less    She is doing some sleep intervention because of chronic sleep disturbance    Ms. Avendaño presents today with the following:    Neck tension  Voice quality:    Mild backward focus    Variable quality and production technique    THERAPEUTIC ACTIVITIES  Today Ms. Avendaño participated in the following therapeutic activities:    Reviewed techniques for applying ice, heat, and massage to the tender areas of her neck, in order to reduce pain.    [facility] concepts and technqiues for using saline and plain-water gargling and salsine nasal irrigatio to reduce the thickened secretions / laryngeal irritation.    [facility] concepts and techniques for improving topical and systemic hydration  "    [place] exercises to add phonation to an optimal flowing airstream.  o Semi-occluded vocal tract exercises with a straw (\"cup and bubbles\") were most facilitating  o Instructed to use as an exercise for coordination of breath flow with phonation.  o good learning, but will need practice     [place] concepts of an optimal regimen for practice.  o she should use an interval schedule of practice, with brief periods of practice frequently throughout each day    I provided an after visit summary to help facilitate practice.    IMPRESSIONS/GOALS/PLAN  Ms. Avendaño had a productive first session of therapy today, working on techniques/strategies/exercises that will help her achieve her goal of reducing  J38.7 (Irritable Larynx Syndrome)   R49.0 (Dysphonia)   And achieving acceptable and comfortable voice use and laryngeal comfort, allowing her to meet personal and professional vocal demands and fully engage in activities of daily living.   She will work on her exercises on a daily basis, and work on incorporating the techniques into her daily vocal activities.    Goals for this practice period:     practice all exercises according to instructions    incorporate techniques into daily vocal activities    maintain vigilance for vocal technique    Plan: I will see Ms. Avendaño in three weeks to work on education, modification, and carryover of therapeutic activities to more complex phonatory tasks.    PRIMARY ICD-10 code:  J38.7 (Irritable Larynx Syndrome)  SECONDARY ICD-10 code:  R49.0 (Dysphonia)     TOTAL SERVICE TIME: 60 minutes  TREATMENT (29726): 60 minutes  NO CHARGE FACILITY FEE (00924)    Deanna Turner, Ph.D., [facility]-SLP  Speech-Language Pathologist  Director, [facility]  [phone]                "

## 2018-08-03 NOTE — PATIENT INSTRUCTIONS
After Visit Summary    Patient: Shelia Avendaño  Date of Visit: 8/3/2018    ILS:     Systemic Hydration: internal hydration of the entire body      Topical Hydration: hydration for the surface mucosa of the larynx and vocal folds.        Nasal Irrigation/Nasal Spray      Gargling      Relieving Extrinsic Muscle Discomfort:    Ice and Heat  o Go back to that if it's useful      Voice:    Semi-occluded vocal tract exercise:   o Bubbles (straw in 1.5 to 2  of water) 10x/day for 1-2 min:  o 3x: blow 10-15 seconds with no voice and keep bubbles consistent.  o 3x: blow bubbles and add a sustained  hhmmmm    - Sense your voice in the straw, or in your mouth/face, not in your throat  o 3x: blow bubbles and vary  who  gliding up and down             Up and down like a siren    o 1-2x: Happy birthday bubbles (keep connected)  These exercises are great for:  *  *  Laryngeal relaxation and applying optimal breath flow to speech/singing.

## 2018-08-03 NOTE — MR AVS SNAPSHOT
After Visit Summary   8/3/2018    Shelia Avendaño    MRN: [id number]           Patient Information     Date Of Birth          1960        Visit Information        Provider Department      8/3/2018 3:30 PM Deanna Turner, SLP [facility]        Today's Diagnoses     Irritable larynx syndrome    -  1    Dysphonia          Care Instructions      After Visit Summary    Patient: Shelia Avendaño  Date of Visit: 8/3/2018    ILS:     Systemic Hydration: internal hydration of the entire body      Topical Hydration: hydration for the surface mucosa of the larynx and vocal folds.        Nasal Irrigation/Nasal Spray      Gargling      Relieving Extrinsic Muscle Discomfort:    Ice and Heat  o Go back to that if it's useful      Voice:    Semi-occluded vocal tract exercise:   o Bubbles (straw in 1.5 to 2  of water) 10x/day for 1-2 min:  o 3x: blow 10-15 seconds with no voice and keep bubbles consistent.  o 3x: blow bubbles and add a sustained  hhmmmm    - Sense your voice in the straw, or in your mouth/face, not in your throat  o 3x: blow bubbles and vary  who  gliding up and down             Up and down like a siren    o 1-2x: Happy birthday bubbles (keep connected)  These exercises are great for:  *  *  Laryngeal relaxation and applying optimal breath flow to speech/singing.                 Follow-ups after your visit        Who to contact     Please call your clinic at [phone] to:    Ask questions about your health    Make or cancel appointments    Discuss your medicines    Learn about your test results    Speak to your doctor            Additional Information About Your Visit        Health Discoveryhart Information     "[employer] gives you secure access to your electronic health record. If you see a primary care provider, you can also send messages to your care team and make appointments. If you have questions, please call your primary care clinic.  If you do not have a primary care provider, please call  [phone] and they will assist you.      Upverter is an electronic gateway that provides easy, online access to your medical records. With Upverter, you can request a clinic appointment, read your test results, renew a prescription or communicate with your care team.     To access your existing account, please contact your [facility] or call [phone] for assistance.        Care EveryWhere ID     This is your Care EveryWhere ID. This could be used by other organizations to access your [facility] medical records  [id number]        Your Vitals Were     Last Period                   08/28/2013 (LMP Unknown)            Blood Pressure from Last 3 Encounters:   07/12/18 103/71   06/18/18 113/74   04/10/18 109/66    Weight from Last 3 Encounters:   07/12/18 63.3 kg (139 lb 8 oz)   06/18/18 63 kg (139 lb)   04/10/18 63.5 kg (140 lb)              We Performed the Following     SPEECH/HEARING THERAPY, INDIVIDUAL        Primary Care Provider Office Phone # Fax #    Billy Huynh Mk Laboy [phone] [address]  [facility] [address]        Equal Access to Services     [facility] HospitalX[facility]NA : Hadii aad ku hadasho Soomaali, waaxda luqadaha, qaybta kaalmada adeegyada, karl maxin hayrivkan keivn he . So [facility] [phone].    ATENCIÓN: Si habla español, tiene a johnston disposición servicios gratuitos de asistencia lingüística. Llame al [phone].    We comply with applicable federal civil rights laws and [place] laws. We do not discriminate on the basis of race, color, national origin, age, disability, sex, sexual orientation, or gender identity.            Thank you!     Thank you for choosing [facility]  for your care. Our goal is always to provide you with excellent care. Hearing back from our patients is one way we can continue to improve our services. Please take a few minutes to complete the written survey that you may receive in the mail after your visit  with us. Thank you!             Your Updated Medication List - Protect others around you: Learn how to safely use, store and throw away your medicines at www.disposemymeds.org.          This list is accurate as of 8/3/18 11:59 PM.  Always use your most recent med list.                   Brand Name Dispense Instructions for use Diagnosis    ACYCLOVIR PO      Take 100 mg by mouth daily        amitriptyline 10 MG tablet    ELAVIL     Take  by mouth At Bedtime.        calcium-magnesium 500-250 MG Tabs per tablet    CALMAG     Take 1 tablet by mouth daily        CYTOMEL PO      Take  by mouth. 1/2 tab daily        fluticasone 50 MCG/ACT spray    FLONASE    1 Bottle    Spray 1-2 sprays into both nostrils daily    Viral URI with cough       loratadine-pseudoePHEDrine  MG per 24 hr tablet    CLARITIN-D 24-hour     Take 1 tablet by mouth daily        SYNTHROID PO      Take 188 mcg by mouth daily . 1 mg daily        TAMOXIFEN CITRATE PO

## 2018-08-10 ENCOUNTER — OFFICE VISIT (OUTPATIENT)
Dept: URGENT CARE | Facility: URGENT CARE | Age: 58
End: 2018-08-10
Payer: COMMERCIAL

## 2018-08-10 VITALS
WEIGHT: 139 LBS | HEIGHT: 66 IN | TEMPERATURE: 98 F | BODY MASS INDEX: 22.34 KG/M2 | DIASTOLIC BLOOD PRESSURE: 64 MMHG | HEART RATE: 80 BPM | SYSTOLIC BLOOD PRESSURE: 122 MMHG | RESPIRATION RATE: 14 BRPM

## 2018-08-10 DIAGNOSIS — B00.1 COLD SORE: Primary | ICD-10-CM

## 2018-08-10 PROCEDURE — 99213 OFFICE O/P EST LOW 20 MIN: CPT | Performed by: PHYSICIAN ASSISTANT

## 2018-08-10 RX ORDER — VALACYCLOVIR HYDROCHLORIDE 1 G/1
2000 TABLET, FILM COATED ORAL 2 TIMES DAILY
Qty: 4 TABLET | Refills: 1 | Status: SHIPPED | OUTPATIENT
Start: 2018-08-10 | End: 2021-08-15

## 2018-08-10 NOTE — PROGRESS NOTES
HPI:  Shelia is a 56 yo female who presents for cold sore on lower lip x this afternoon.  Hx of cold sores approx 2-3 episodes a year.   Used to have Valtrex Rx but has run out of refills.  No pain or tingling.    ROS:  See HPI      PE:  Vitals & nursing notes reviewed. B/P: 122/64, T: 98, P: 80, R: 14  Constitutional:  Alert, well nourished, well-developed, NAD  Mouth: 1 small ulceration on right lower lip.  No discharge.    ASSESSMENT:  (B00.1) Cold sore  (primary encounter diagnosis)  Plan: valACYclovir (VALTREX) 1000 mg tablet         F/U with PCP if sx persist or worsen.

## 2018-08-10 NOTE — MR AVS SNAPSHOT
"              After Visit Summary   8/10/2018    Shelia Avendaño    MRN: [id number]           Patient Information     Date Of Birth          1960        Visit Information        Provider Department      8/10/2018 5:00 PM Vicky Howard PA-C [facility]        Today's Diagnoses     Cold sore    -  1       Follow-ups after your visit        Who to contact     If you have questions or need follow up information about today's clinic visit or your schedule please contact [facility] directly at [phone].  Normal or non-critical lab and imaging results will be communicated to you by Healthifyhart, letter or phone within 4 business days after the clinic has received the results. If you do not hear from us within 7 days, please contact the clinic through Healthifyhart or phone. If you have a critical or abnormal lab result, we will notify you by phone as soon as possible.  Submit refill requests through eduPad or call your pharmacy and they will forward the refill request to us. Please allow 3 business days for your refill to be completed.          Additional Information About Your Visit        MyChart Information     eduPad gives you secure access to your electronic health record. If you see a primary care provider, you can also send messages to your care team and make appointments. If you have questions, please call your primary care clinic.  If you do not have a primary care provider, please call [phone] and they will assist you.        Care EveryWhere ID     This is your Care EveryWhere ID. This could be used by other organizations to access your [facility] medical records  [id number]        Your Vitals Were     Pulse Temperature Respirations Height Last Period BMI (Body Mass Index)    80 98  F (36.7  C) 14 5' 5.5\" (1.664 m) 08/28/2013 (LMP Unknown) 22.78 kg/m2       Blood Pressure from Last 3 Encounters:   08/10/18 122/64   07/12/18 103/71   06/18/18 113/74    " Weight from Last 3 Encounters:   08/10/18 139 lb (63 kg)   07/12/18 139 lb 8 oz (63.3 kg)   06/18/18 139 lb (63 kg)              Today, you had the following     No orders found for display         Today's Medication Changes          These changes are accurate as of 8/10/18  5:06 PM.  If you have any questions, ask your nurse or doctor.               Start taking these medicines.        Dose/Directions    valACYclovir 1000 mg tablet   Commonly known as:  VALTREX   Used for:  Cold sore   Started by:  Vicky Howard PA-C        Dose:  2000 mg   Take 2 tablets (2,000 mg) by mouth 2 times daily   Quantity:  4 tablet   Refills:  1            Where to get your medicines      These medications were sent to [facility] - [place], [place] - [address]  [address], [place] [address]     Phone:  [phone]     valACYclovir 1000 mg tablet                Primary Care Provider Office Phone # Fax #    Billy Lino Laboy [phone]       [address] [place] [address] [facility] [address]        Equal Access to Services     [facility] HospitalXIMENA : Hadii jonathan ku hadasho Soomaali, waaxda luqadaha, qaybta kaalmada adeegyada, karl gagnon hayherbie eh . So [facility] [phone].    ATENCIÓN: Si habla español, tiene a johnston disposición servicios gratuitos de asistencia lingüística. [facility] [phone].    We comply with applicable federal civil rights laws and [place] laws. We do not discriminate on the basis of race, color, national origin, age, disability, sex, sexual orientation, or gender identity.            Thank you!     Thank you for choosing [facility]  for your care. Our goal is always to provide you with excellent care. Hearing back from our patients is one way we can continue to improve our services. Please take a few minutes to complete the written survey that you may receive in the mail after your visit with us. Thank you!             Your Updated  Medication List - Protect others around you: Learn how to safely use, store and throw away your medicines at www.disposemymeds.org.          This list is accurate as of 8/10/18  5:06 PM.  Always use your most recent med list.                   Brand Name Dispense Instructions for use Diagnosis    ACYCLOVIR PO      Take 100 mg by mouth daily        amitriptyline 10 MG tablet    ELAVIL     Take  by mouth At Bedtime.        calcium-magnesium 500-250 MG Tabs per tablet    CALMAG     Take 1 tablet by mouth daily        CYTOMEL PO      Take  by mouth. 1/2 tab daily        fluticasone 50 MCG/ACT spray    FLONASE    1 Bottle    Spray 1-2 sprays into both nostrils daily    Viral URI with cough       loratadine-pseudoePHEDrine  MG per 24 hr tablet    CLARITIN-D 24-hour     Take 1 tablet by mouth daily        SYNTHROID PO      Take 188 mcg by mouth daily . 1 mg daily        TAMOXIFEN CITRATE PO           valACYclovir 1000 mg tablet    VALTREX    4 tablet    Take 2 tablets (2,000 mg) by mouth 2 times daily    Cold sore

## 2018-09-30 ENCOUNTER — NURSE TRIAGE (OUTPATIENT)
Dept: NURSING | Facility: CLINIC | Age: 58
End: 2018-09-30

## 2018-09-30 NOTE — TELEPHONE ENCOUNTER
Patient calling requesting information on which [facility] are open today and what time. RN gave the names of [facility] open today and the time they open. Caller verbalized understanding. Denies further questions.      Brigido Jade RN  [facility]       Additional Information    General information question, no triage required and triager able to answer question    Protocols used: INFORMATION ONLY CALL-ADULT-

## 2018-09-30 NOTE — TELEPHONE ENCOUNTER
"Patient is requesting a refill of Valtrex for a devoloping cold sore.  FNA advised to have [facility] transfer the medication from [facility].  Patient verbalized understanding.      Additional Information    Negative: Drug overdose and nurse unable to answer question    Negative: Caller requesting information not related to medicine    Negative: Caller requesting a prescription for Strep throat and has a positive culture result    Negative: Rash while taking a medication or within 3 days of stopping it    Negative: Immunization reaction suspected    Negative: [1] Asthma and [2] having symptoms of asthma (cough, wheezing, etc)    Negative: MORE THAN A DOUBLE DOSE of a prescription or over-the-counter (OTC) drug    Negative: [1] DOUBLE DOSE (an extra dose or lesser amount) of over-the-counter (OTC) drug AND [2] any symptoms (e.g., dizziness, nausea, pain, sleepiness)    Negative: [1] DOUBLE DOSE (an extra dose or lesser amount) of prescription drug AND [2] any symptoms (e.g., dizziness, nausea, pain, sleepiness)    Negative: Took another person's prescription drug    Negative: [1] DOUBLE DOSE (an extra dose or lesser amount) of prescription drug AND [2] NO symptoms (Exception: a double dose of antibiotics)    Negative: Diabetes drug error or overdose (e.g., insulin or extra dose)    Negative: [1] Request for URGENT new prescription or refill of \"essential\" medication (i.e., likelihood of harm to patient if not taken) AND [2] triager unable to fill per unit policy    Negative: [1] Prescription not at pharmacy AND [2] was prescribed today by PCP    Negative: Pharmacy calling with prescription questions and triager unable to answer question    Negative: Caller has URGENT medication question about med that PCP prescribed and triager unable to answer question    Negative: Caller has NON-URGENT medication question about med that PCP prescribed and triager unable to answer question    Negative: Caller requesting a NON-URGENT new " prescription or refill and triager unable to refill per unit policy    Negative: Caller has medication question about med not prescribed by PCP and triager unable to answer question (e.g., compatibility with other med, storage)    Negative: [1] DOUBLE DOSE (an extra dose or lesser amount) of over-the-counter (OTC) drug AND [2] NO symptoms (all triage questions negative)    Negative: [1] DOUBLE DOSE (an extra dose or lesser amount) of antibiotic drug AND [2] NO symptoms (all triage questions negative)    Caller has medication question only, adult not sick, and triager answers question    Protocols used: [facility]

## 2018-10-06 ENCOUNTER — HEALTH MAINTENANCE LETTER (OUTPATIENT)
Age: 58
End: 2018-10-06

## 2019-03-01 NOTE — PROGRESS NOTES
SUBJECTIVE: Shelia Avendaño , a 58 year old  female, presents for counseling and information regarding upcoming travel to [place]. Special medical concerns include: none. She anticipates the following unusual exposures: none.    Itinerary:  [place]     Departure Date: 3/13/19 Return date: 3/20/19    Reason for travel (i.e. Business, pleasure): pleasure     Visiting an urban or rural area?: urban     Accommodations (i.e. hotel, hostel, friends, family, etc): airbnb    Women - First day of your last period: na     IMMUNIZATION HISTORY  Have you received any vaccinations in the past 4 weeks?  No  Have you ever fainted from having your blood drawn or from an injection?  No  Have you ever had a fever reaction to vaccination?  No  Have you ever had any bad reaction or side effect from any vaccination?  No  Have you ever had hepatitis A or B vaccine?  Yes  Do you live (or work closely) with anyone who has AIDS, an AIDS-like condition, any other immune disorder or who is on chemotherapy for cancer?  No  Have you received any injection of immune globulin or any blood products during the past 12 months?  No    GENERAL MEDICAL HISTORY  Do you have a medical condition that warrants maintenance medication or physician follow-up?  Ye s  Do you have a medical condition that is stable now, but that may recur while traveling?  No  Has your spleen been removed?  No  Have you had an acute illness or a fever in the past 48 hours?  No  Are you pregnant, or might you become pregnant on this trip?  Any chance of pregnancy?  No  Are you breastfeeding?  No  Do you have HIV, AIDS, an AIDS-like condition, any other immune disorder, leukemia or cancer?  No  Do you have a severe combined immunodeficiency disease?  No  Have you had your thymus gland removed or history of problems with your thymus, such as myasthenia gravis, DiGeorge syndrome, or thymoma?  No    Do you have severe thrombocytopenia (low platelet count) or a coagulation disorder?   No  Have you ever had a convulsion, seizure, epilepsy, neurologic condition or brain infection?  No  Do you have any stomach conditions?  No  Do you have a G6PD deficiency?  No  Do you have severe renal or kidney impairment?  No  Do you have a history of psychiatric problems?  No  Do you have a problem with strange dreams and/or nightmares?  No  Do you have insomnia?  Yes   Do you have problems with vaginitis?  Yes   Do you have psoriasis?  No  Are you prone to motion sickness?  No  Have you ever had headaches, nausea, vomiting, or breathing problems from altitude exposure?  No      Past Medical History:   Diagnosis Date     Adjustment disorder with mixed anxiety and depressed mood 10/12/2011     Closed fracture of one or more phalanges of foot 1/19/2012     HSV infection 11/8/2017     Hypothyroid 10/8/2010     Osteoarthrosis, hand 7/20/2006    Overview:  LW Onset:  1990s ; DJD Hand     Osteopenia      Sinus problem       Immunization History   Administered Date(s) Administered     DTaP, Unspecified 01/01/2011, 11/28/2011     FLU 6-35 months 10/07/2009, 10/19/2010, 10/28/2014, 10/31/2015, 10/23/2017     Flu, Unspecified 11/29/2000, 10/22/2007, 10/07/2009, 10/19/2010, 10/15/2011, 10/20/2013, 10/15/2014, 09/26/2016     [id number] Novel Flu P-free 01/18/2010     HEPA 11/26/2003     HepA-Adult 11/01/2003, 11/26/2003, 02/07/2004     Influenza (H1N1) 01/18/2010     Influenza (IIV3) PF 11/29/2000, 10/21/2002, 10/22/2007, 10/15/2008, 10/24/2011, 10/01/2012, 11/17/2012, 11/01/2013     Influenza Vaccine IM 3yrs+ 4 Valent IIV4 11/01/2015     Influenza Vaccine, 3 YRS +, IM (QUADRIVALENT W/PRESERVATIVES) 10/25/2018     LYMERIX 05/09/2000     TD (ADULT, 7+) 07/14/2000, 01/01/2003, 01/01/2011     TDAP Vaccine (Adacel) 07/14/2000, 11/28/2011     Td (Adult), Adsorbed 07/14/2000     Tdap (Adacel,Boostrix) 11/28/2011     Typhoid Oral 05/15/2007       Current Outpatient Medications   Medication Sig Dispense Refill     ACYCLOVIR PO Take  100 mg by mouth daily        amitriptyline (ELAVIL) 10 MG tablet Take  by mouth At Bedtime.       calcium-magnesium (CALMAG) 500-250 MG TABS Take 1 tablet by mouth daily       fluticasone (FLONASE) 50 MCG/ACT spray Spray 1-2 sprays into both nostrils daily (Patient not taking: Reported on 8/10/2018) 1 Bottle 0     Levothyroxine Sodium (SYNTHROID PO) Take 188 mcg by mouth daily . 1 mg daily       Liothyronine Sodium (CYTOMEL PO) Take  by mouth. 1/2 tab daily       loratadine-pseudoePHEDrine (CLARITIN-D 24-HOUR)  MG per 24 hr tablet Take 1 tablet by mouth daily       TAMOXIFEN CITRATE PO        valACYclovir (VALTREX) 1000 mg tablet Take 2 tablets (2,000 mg) by mouth 2 times daily 4 tablet 1     No Known Allergies     EXAM: deferred    Immunizations discussed include: Typhoid  Malaria prophylaxis recommended: not needed  Symptomatic treatment for traveler's diarrhea: bismuth subsalicylate, loperamide/diphenoxylate and azithromycin    ASSESSMENT/PLAN:    (Z71.89) Encounter for counseling for travel  (primary encounter diagnosis)    Comment: Typhoid vaccine today. Patient will return or follow-up with PCP as needed. Prophylaxis given for Traveler's diarrhea and is not needed for Malaria. All questions were answered.     Plan: azithromycin (ZITHROMAX) 500 MG tablet            (Z23) Need for immunization against typhoid  Comment:   Plan: TYPHOID VACCINE, IM              I have reviewed general recommendations for safe travel   including: food/water precautions, insect avoidance, safe sex   practices given high prevalence of HIV and other STDs,   roadway safety. Educational materials and links to the CDC   Traveler's health website have been provided.    Total time 20 minutes, greater than 50 percent in counseling   and coordination of care.

## 2019-03-04 ENCOUNTER — OFFICE VISIT (OUTPATIENT)
Dept: FAMILY MEDICINE | Facility: CLINIC | Age: 59
End: 2019-03-04
Payer: COMMERCIAL

## 2019-03-04 VITALS
BODY MASS INDEX: 23.63 KG/M2 | TEMPERATURE: 97.8 F | HEIGHT: 66 IN | WEIGHT: 147 LBS | SYSTOLIC BLOOD PRESSURE: 108 MMHG | OXYGEN SATURATION: 100 % | RESPIRATION RATE: 16 BRPM | HEART RATE: 85 BPM | DIASTOLIC BLOOD PRESSURE: 74 MMHG

## 2019-03-04 DIAGNOSIS — Z71.84 ENCOUNTER FOR COUNSELING FOR TRAVEL: Primary | ICD-10-CM

## 2019-03-04 DIAGNOSIS — Z23 NEED FOR IMMUNIZATION AGAINST TYPHOID: ICD-10-CM

## 2019-03-04 PROCEDURE — 90471 IMMUNIZATION ADMIN: CPT | Performed by: PHYSICIAN ASSISTANT

## 2019-03-04 PROCEDURE — 99401 PREV MED CNSL INDIV APPRX 15: CPT | Mod: 25 | Performed by: PHYSICIAN ASSISTANT

## 2019-03-04 PROCEDURE — 90691 TYPHOID VACCINE IM: CPT | Performed by: PHYSICIAN ASSISTANT

## 2019-03-04 RX ORDER — AZITHROMYCIN 500 MG/1
500 TABLET, FILM COATED ORAL DAILY
Qty: 3 TABLET | Refills: 0 | Status: SHIPPED | OUTPATIENT
Start: 2019-03-04 | End: 2019-03-07

## 2019-03-04 ASSESSMENT — MIFFLIN-ST. JEOR: SCORE: 1260.6

## 2019-03-04 NOTE — PATIENT INSTRUCTIONS
See travel packet provided  Recommend ultrathon, pepto bismol and imodium  Also bring hand  and sun screen with you.  Safe Travels     Today March 4, 2019 you received the    Typhoid - injectable. This vaccine is valid for two years.   .    These appointments can be made as a NURSE ONLY visit.    **It is very important for the vaccinations to be given on the scheduled day(s), this helps ensure you receive the full effectiveness of the vaccine.**    Please call [facility] with any questions [phone]    Thank you for visiting [facility]

## 2019-08-01 NOTE — PROGRESS NOTES
Chief Complaint   Patient presents with     Cough     Subjective     Shelia Avendaño is a 58 year old female who presents to clinic today for the following health issues:    HPI   Bronchitis      Duration: 12 days ago    Description  Started as sore throat and post nasal drip. Then it was flu-like symptoms with diffuse body aches with low grade fevers. Now the main symptoms is a cough.     Severity: Mild, today it feels as if it might be improving     Accompanying signs and symptoms: None    History (predisposing factors):  Had similar symptoms in 2018. Was diagnosed with bronchitis and treated with improvement of symptoms.     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     Backache     Recurrent genital HSV (herpes simplex virus) infection     S/P radioactive iodine thyroid ablation     Irritable larynx syndrome     Past Surgical History:   Procedure Laterality Date     TONSILLECTOMY         Social History     Tobacco Use     Smoking status: Never Smoker     Smokeless tobacco: Never Used   Substance Use Topics     Alcohol use: No     Alcohol/week: 0.0 oz     Family History   Problem Relation Age of Onset     Prostate Cancer Father 55     Thyroid Disease Mother          Current Outpatient Medications   Medication Sig Dispense Refill     ACYCLOVIR PO Take 100 mg by mouth daily        amitriptyline (ELAVIL) 10 MG tablet Take  by mouth At Bedtime.       azithromycin (ZITHROMAX) 250 MG tablet Take 2  "tablets (500 mg) by mouth daily for 1 day, THEN 1 tablet (250 mg) daily for 4 days. 6 tablet 0     calcium-magnesium (CALMAG) 500-250 MG TABS Take 1 tablet by mouth daily       Levothyroxine Sodium (SYNTHROID PO) Take 188 mcg by mouth daily . 1 mg daily       Liothyronine Sodium (CYTOMEL PO) Take  by mouth. 1/2 tab daily       TAMOXIFEN CITRATE PO        valACYclovir (VALTREX) 1000 mg tablet Take 2 tablets (2,000 mg) by mouth 2 times daily 4 tablet 1     No Known Allergies      Reviewed and updated as needed this visit by Provider         Review of Systems    ROS: 10 point ROS neg other than the symptoms noted above in the HPI.        Objective    /72 (BP Location: Left arm, Patient Position: Sitting, Cuff Size: Adult Regular)   Pulse 97   Temp 97.1  F (36.2  C) (Oral)   Resp 16   Ht 1.664 m (5' 5.5\")   Wt 64.4 kg (142 lb)   LMP 08/28/2013 (LMP Unknown)   SpO2 95%   BMI 23.27 kg/m    Body mass index is 23.27 kg/m .  Physical Exam   Constitutional: She is oriented to person, place, and time. She appears well-developed and well-nourished. No distress.   HENT:   Head: Normocephalic and atraumatic.   Right Ear: Hearing, tympanic membrane, external ear and ear canal normal.   Left Ear: Hearing, tympanic membrane, external ear and ear canal normal.   Nose: Nose normal. Right sinus exhibits no maxillary sinus tenderness and no frontal sinus tenderness. Left sinus exhibits no maxillary sinus tenderness and no frontal sinus tenderness.   Mouth/Throat: Uvula is midline, oropharynx is clear and moist and mucous membranes are normal. No oropharyngeal exudate.   Eyes: Pupils are equal, round, and reactive to light. Conjunctivae, EOM and lids are normal.   Neck: Normal range of motion.   Cardiovascular: Normal rate, regular rhythm and normal heart sounds.   Pulmonary/Chest: Effort normal. She has decreased breath sounds in the right middle field. She has no wheezes. She has no rhonchi. She has no rales. "   Musculoskeletal: Normal range of motion.   Neurological: She is alert and oriented to person, place, and time.   Skin: Skin is warm and dry. She is not diaphoretic.   Psychiatric: She has a normal mood and affect. Her behavior is normal.   Nursing note and vitals reviewed.     Diagnostic Test Results:  Labs reviewed in Epic        Assessment & Plan     1. Cough  - discussed performing chest x-ray, patient declined due to radiation concerns and feeling as though her symptoms have been improving.  - we agreed to provide paper prescription of zithromax for patient to fill if not improving  - azithromycin (ZITHROMAX) 250 MG tablet; Take 2 tablets (500 mg) by mouth daily for 1 day, THEN 1 tablet (250 mg) daily for 4 days.  Dispense: 6 tablet; Refill: 0  - if worsening will return to care       Patient Instructions   Fill the prescription for Zithromax if you do not continue to improve   Continue to push fluids and rest  If any new or worsening symptoms return to care for further evaluation      Return if symptoms worsen or fail to improve.    Niels Ge PA-C  [facility]

## 2019-08-02 ENCOUNTER — OFFICE VISIT (OUTPATIENT)
Dept: FAMILY MEDICINE | Facility: CLINIC | Age: 59
End: 2019-08-02
Payer: COMMERCIAL

## 2019-08-02 VITALS
RESPIRATION RATE: 16 BRPM | BODY MASS INDEX: 22.82 KG/M2 | WEIGHT: 142 LBS | OXYGEN SATURATION: 95 % | DIASTOLIC BLOOD PRESSURE: 72 MMHG | HEART RATE: 97 BPM | HEIGHT: 66 IN | SYSTOLIC BLOOD PRESSURE: 106 MMHG | TEMPERATURE: 97.1 F

## 2019-08-02 DIAGNOSIS — R05.9 COUGH: Primary | ICD-10-CM

## 2019-08-02 PROCEDURE — 99213 OFFICE O/P EST LOW 20 MIN: CPT | Performed by: PHYSICIAN ASSISTANT

## 2019-08-02 RX ORDER — AZITHROMYCIN 250 MG/1
TABLET, FILM COATED ORAL
Qty: 6 TABLET | Refills: 0 | Status: SHIPPED | OUTPATIENT
Start: 2019-08-02 | End: 2019-08-07

## 2019-08-02 ASSESSMENT — MIFFLIN-ST. JEOR: SCORE: 1237.92

## 2019-08-02 NOTE — PATIENT INSTRUCTIONS
Fill the prescription for Zithromax if you do not continue to improve   Continue to push fluids and rest  If any new or worsening symptoms return to care for further evaluation

## 2019-09-28 ENCOUNTER — HEALTH MAINTENANCE LETTER (OUTPATIENT)
Age: 59
End: 2019-09-28

## 2019-12-07 ENCOUNTER — MYC MEDICAL ADVICE (OUTPATIENT)
Dept: FAMILY MEDICINE | Facility: CLINIC | Age: 59
End: 2019-12-07

## 2019-12-07 DIAGNOSIS — Z78.9: Primary | ICD-10-CM

## 2019-12-09 NOTE — TELEPHONE ENCOUNTER
It looks like she has had Hep A injections 2/7/2004, 11/26/2003 and 11/1/2003. They are not 6 months apart but she should still be good on this time frame shouldn't she? Should we get a hep A titer done just to make sure?

## 2020-03-15 ENCOUNTER — HEALTH MAINTENANCE LETTER (OUTPATIENT)
Age: 60
End: 2020-03-15

## 2021-01-10 ENCOUNTER — HEALTH MAINTENANCE LETTER (OUTPATIENT)
Age: 61
End: 2021-01-10

## 2021-08-12 ENCOUNTER — E-VISIT (OUTPATIENT)
Dept: FAMILY MEDICINE | Facility: CLINIC | Age: 61
End: 2021-08-12
Payer: COMMERCIAL

## 2021-08-12 DIAGNOSIS — Z53.9 ERRONEOUS ENCOUNTER--DISREGARD: Primary | ICD-10-CM

## 2021-08-13 ENCOUNTER — TELEPHONE (OUTPATIENT)
Dept: FAMILY MEDICINE | Facility: CLINIC | Age: 61
End: 2021-08-13

## 2021-08-13 NOTE — PATIENT INSTRUCTIONS
Thank you for choosing us for your care. Based on the information provided, I believe you need to be seen immediately.  Please go urgent care as soon as possible.     You will not be charged for this eVisit.

## 2021-08-13 NOTE — TELEPHONE ENCOUNTER
I called and left a message for Shelia with providers message.    Aixa Terrazas RN        Per e visit dated 8/13/2021:  Hey Triage,   Please call Shelia and advise urgent care visit for back pain. She has not been seen for some time and if wanting a pain medication should be evaluated face to face instead of e-visit. Thanks!   Niels

## 2021-08-13 NOTE — TELEPHONE ENCOUNTER
I called and left a message for Shelia with providers message. See phone call dated 8/13/22021.    Aixa Terrazas RN

## 2021-08-15 ENCOUNTER — OFFICE VISIT (OUTPATIENT)
Dept: URGENT CARE | Facility: URGENT CARE | Age: 61
End: 2021-08-15
Payer: COMMERCIAL

## 2021-08-15 VITALS
BODY MASS INDEX: 22.94 KG/M2 | SYSTOLIC BLOOD PRESSURE: 120 MMHG | HEART RATE: 91 BPM | OXYGEN SATURATION: 99 % | DIASTOLIC BLOOD PRESSURE: 78 MMHG | TEMPERATURE: 98 F | WEIGHT: 140 LBS

## 2021-08-15 DIAGNOSIS — G51.8 PAIN DUE TO NEUROPATHY OF FACIAL NERVE: Primary | ICD-10-CM

## 2021-08-15 PROCEDURE — 99213 OFFICE O/P EST LOW 20 MIN: CPT | Performed by: NURSE PRACTITIONER

## 2021-08-15 RX ORDER — VALACYCLOVIR HYDROCHLORIDE 1 G/1
1000 TABLET, FILM COATED ORAL 3 TIMES DAILY
Qty: 21 TABLET | Refills: 0 | Status: SHIPPED | OUTPATIENT
Start: 2021-08-15 | End: 2021-08-22

## 2021-08-15 NOTE — PROGRESS NOTES
Chief Complaint   Patient presents with     Urgent Care     Shingles     c/o shingles         ICD-10-CM    1. 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  If symptoms continue when she is out of town recommend she follow-up with her primary care provider when she returns.    Subjective     Shelia Avendaño is an 60 year old female who presents to clinic today for shooting pain in the face and neck for 4 seconds at a time  Lasting  About 45 minutes.     ROS: 10 point ROS neg other than the symptoms noted above in the HPI.       Objective    /78   Pulse 91   Temp 98  F (36.7  C) (Tympanic)   Wt 63.5 kg (140 lb)   LMP 08/28/2013 (LMP Unknown)   SpO2 99%   BMI 22.94 kg/m      Physical Exam       GENERAL APPEARANCE: healthy appearing, alert     EYES: PERRL, EOMI, sclera non-icteric     HENT: oral exam benign, mucus membranes intact, without ulcers or lesions     NECK: no adenopathy or asymmetry, thyroid normal to palpation     MS: extremities normal- no gross deformities noted; normal muscle tone.     SKIN: no suspicious lesions or rashes     NEURO: oriented times 3, cranial nerves 2-12 intact, Romberg negative, rapid alternating movements normal, proprioception normal and normal strength throughout     PSYCH: normal thought process; no significant mood disturbance    Patient Instructions     Patient Education     Shingles  Shingles is a viral infection caused by the same virus that causes chicken pox. Anyone who has had chicken pox may get shingles later in life. The virus stays in the body, but remains asleep (dormant). Shingles often occurs in older persons or persons with lowered immunity. But it can affect anyone at any age.  Shingles starts as a tingling patch of skin on one side of the body.  Small, painful blisters may then appear. The rash rarely spreads to other parts of the body.  Exposure to shingles can't cause shingles. However, it can cause chicken pox in anyone who has not had chicken pox or has not been vaccinated. The contagious period ends when all blisters have crusted over, generally 1 to 2 weeks after the illness starts.  After the blisters heal, the affected skin may be sensitive or painful for weeks or months, gradually resolving over time. But, sometimes this can last longer and be permanent (called postherpetic neuralgia.)  Shingles vaccines are available. Vaccination can help prevent shingles or make it less painful. It is generally recommended for adults older than 50, even if you've had singles in the past. Talk with your healthcare provider about when to get vaccinated and which vaccine is best for you.  Home care    Medicines may be prescribed to help relieve pain. Take these medicines as directed. Ask your healthcare provider or pharmacist before using over-the-counter medicines for helping treat pain and itching.    In certain cases, antiviral medicines may be prescribed to reduce pain, shorten the illness, and prevent neuralgia. Take these medicines as directed.    Compresses made from a solution of cool water mixed with cornstarch or baking soda may help relieve pain and itching.     Gently wash skin daily with soap and water to help prevent infection. Be certain to rinse off all of the soap, which can be irritating.    Trim fingernails and try not to scratch. Scratching the sores may leave scars.    Stay home from work or school until all blisters have formed a crust and you are no longer contagious.  Follow-up care  Follow up with your healthcare provider, or as directed.  When to seek medical advice    Fever of 100.4 F (38 C) or higher, or as directed by your healthcare provider    Affected skin is on the face or neck and any of the following occur:  ? Headache  ? Eye  pain  ? Changes in vision  ? Sores near the eye  ? Weakness of facial muscles    Blisters occurring on new areas of the body    Pain, redness, or swelling of a joint    Signs of skin infection: colored drainage from the sores, warmth, increasing redness, fever, or increasing pain  Bernadette last reviewed this educational content on 4/1/2018 2000-2021 [employer]. All rights reserved. This information is not intended as a substitute for professional medical care. Always follow your healthcare professional's instructions.               MARILOU Ceja, CNP  [facility] Provider

## 2021-10-07 ENCOUNTER — OFFICE VISIT (OUTPATIENT)
Dept: URGENT CARE | Facility: URGENT CARE | Age: 61
End: 2021-10-07
Payer: COMMERCIAL

## 2021-10-07 VITALS
TEMPERATURE: 98 F | DIASTOLIC BLOOD PRESSURE: 73 MMHG | RESPIRATION RATE: 20 BRPM | SYSTOLIC BLOOD PRESSURE: 123 MMHG | OXYGEN SATURATION: 98 % | HEART RATE: 78 BPM

## 2021-10-07 DIAGNOSIS — R07.0 THROAT PAIN: Primary | ICD-10-CM

## 2021-10-07 LAB
BASOPHILS # BLD AUTO: 0 10E3/UL (ref 0–0.2)
BASOPHILS NFR BLD AUTO: 0 %
DEPRECATED S PYO AG THROAT QL EIA: NEGATIVE
EOSINOPHIL # BLD AUTO: 0.1 10E3/UL (ref 0–0.7)
EOSINOPHIL NFR BLD AUTO: 1 %
ERYTHROCYTE [DISTWIDTH] IN BLOOD BY AUTOMATED COUNT: 12.5 % (ref 10–15)
HCT VFR BLD AUTO: 38.8 % (ref 35–47)
HGB BLD-MCNC: 12.4 G/DL (ref 11.7–15.7)
LYMPHOCYTES # BLD AUTO: 3.6 10E3/UL (ref 0.8–5.3)
LYMPHOCYTES NFR BLD AUTO: 45 %
MCH RBC QN AUTO: 31 PG (ref 26.5–33)
MCHC RBC AUTO-ENTMCNC: 32 G/DL (ref 31.5–36.5)
MCV RBC AUTO: 97 FL (ref 78–100)
MONOCYTES # BLD AUTO: 0.6 10E3/UL (ref 0–1.3)
MONOCYTES NFR BLD AUTO: 8 %
MONOCYTES NFR BLD AUTO: NEGATIVE %
NEUTROPHILS # BLD AUTO: 3.7 10E3/UL (ref 1.6–8.3)
NEUTROPHILS NFR BLD AUTO: 46 %
PLATELET # BLD AUTO: 276 10E3/UL (ref 150–450)
RBC # BLD AUTO: 4 10E6/UL (ref 3.8–5.2)
WBC # BLD AUTO: 7.9 10E3/UL (ref 4–11)

## 2021-10-07 PROCEDURE — 87651 STREP A DNA AMP PROBE: CPT | Performed by: FAMILY MEDICINE

## 2021-10-07 PROCEDURE — U0003 INFECTIOUS AGENT DETECTION BY NUCLEIC ACID (DNA OR RNA); SEVERE ACUTE RESPIRATORY SYNDROME CORONAVIRUS 2 (SARS-COV-2) (CORONAVIRUS DISEASE [COVID-19]), AMPLIFIED PROBE TECHNIQUE, MAKING USE OF HIGH THROUGHPUT TECHNOLOGIES AS DESCRIBED BY CMS-2020-01-R: HCPCS | Performed by: FAMILY MEDICINE

## 2021-10-07 PROCEDURE — 86308 HETEROPHILE ANTIBODY SCREEN: CPT | Performed by: FAMILY MEDICINE

## 2021-10-07 PROCEDURE — U0005 INFEC AGEN DETEC AMPLI PROBE: HCPCS | Performed by: FAMILY MEDICINE

## 2021-10-07 PROCEDURE — 99213 OFFICE O/P EST LOW 20 MIN: CPT | Performed by: FAMILY MEDICINE

## 2021-10-07 PROCEDURE — 36415 COLL VENOUS BLD VENIPUNCTURE: CPT | Performed by: FAMILY MEDICINE

## 2021-10-07 PROCEDURE — 85025 COMPLETE CBC W/AUTO DIFF WBC: CPT | Performed by: FAMILY MEDICINE

## 2021-10-07 NOTE — PROGRESS NOTES
SUBJECTIVE:   Shelia Avendaño is a 61 year old female presenting with a chief complaint of sore throat.  No fever or cough, congestion.  Onset of symptoms was 3 week(s) ago.  Course of illness is worsening.    Severity moderate  Current and Associated symptoms: sore throat  Treatment measures tried include Fluids and Rest.  Predisposing factors include None.    Has chronic sore throat and is aware of what this feels like - larynx more irritable.  This current symptoms seems just a little different.  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       calcium-magnesium (CALMAG) 500-250 MG TABS Take 1 tablet by mouth daily       Levothyroxine Sodium (SYNTHROID PO) Take 188 mcg by mouth daily . 1 mg daily       Liothyronine Sodium (CYTOMEL PO) Take  by mouth. 1/2 tab daily       TAMOXIFEN CITRATE PO        valACYclovir (VALTREX) 1000 mg tablet Take 1 tablet (1,000 mg) by mouth 3 times daily for 7 days 21 tablet 0     Social History     Tobacco Use     Smoking status: Never Smoker     Smokeless tobacco: Never Used   Substance Use Topics     Alcohol use: No     Alcohol/week: 0.0 standard drinks       ROS:  Review of systems negative except as stated above.    OBJECTIVE:  /73   Pulse 78   Temp 98  F (36.7  C) (Tympanic)   Resp 20   LMP 08/28/2013 (LMP Unknown)    SpO2 98%   GENERAL APPEARANCE: healthy, alert and no distress  EYES: EOMI,  PERRL, conjunctiva clear  HENT: ear canals and TM's normal.  Nose and mouth without ulcers, erythema or lesions  NECK: supple, nontender, no lymphadenopathy  PSYCH: mentation appears normal and affect normal/bright    Results for orders placed or performed in visit on 10/07/21   Mononucleosis screen     Status: Normal   Result Value Ref Range    Mononucleosis Screen Negative Negative   CBC with platelets and differential     Status: None   Result Value Ref Range    WBC Count 7.9 4.0 - 11.0 10e3/uL    RBC Count 4.00 3.80 - 5.20 10e6/uL    Hemoglobin 12.4 11.7 - 15.7 g/dL    Hematocrit 38.8 35.0 - 47.0 %    MCV 97 78 - 100 fL    MCH 31.0 26.5 - 33.0 pg    MCHC 32.0 31.5 - 36.5 g/dL    RDW 12.5 10.0 - 15.0 %    Platelet Count 276 150 - 450 10e3/uL    % Neutrophils 46 %    % Lymphocytes 45 %    % Monocytes 8 %    % Eosinophils 1 %    % Basophils 0 %    Absolute Neutrophils 3.7 1.6 - 8.3 10e3/uL    Absolute Lymphocytes 3.6 0.8 - 5.3 10e3/uL    Absolute Monocytes 0.6 0.0 - 1.3 10e3/uL    Absolute Eosinophils 0.1 0.0 - 0.7 10e3/uL    Absolute Basophils 0.0 0.0 - 0.2 10e3/uL   Streptococcus A Rapid Screen w/Reflex to PCR     Status: Normal    Specimen: Throat; Swab   Result Value Ref Range    Group A Strep antigen Negative Negative   CBC with platelets and differential     Status: None    Narrative    The following orders were created for panel order CBC with platelets and differential.  Procedure                               Abnormality         Status                     ---------                               -----------         ------                     CBC with platelets and d...[[id number]]                      Final result                 Please view results for these tests on the individual orders.       ASSESSMENT/PLAN:  (R07.0) Throat pain  (primary encounter diagnosis)  Comment: viral  Plan: Symptomatic COVID-19 Virus (Coronavirus) by  PCR        Nose, Streptococcus A Rapid Screen w/Reflex to         PCR, Group A Streptococcus PCR Throat Swab, CBC        with platelets and differential, Mononucleosis         screen            Reassurance given, reviewed initial labs and reviewed symptomatic treatment with tylenol, ibuprofen, plenty of fluids and rest.  Underlying chronic irritable larynx syndrome per patient, discussed that may require ENT assessment with scope if symptoms persists.  Discussed UC role with excluding infectious etiology to rule out other causes - will follow up on throat culture and treat if positive.  COVID screen obtained as symptoms overlap with COVID infection, quarantine guidelines reviewed.     Follow up with primary provider if no improvement of symptoms in 1 week    Blake Mathews MD

## 2021-10-08 LAB — GROUP A STREP BY PCR: NOT DETECTED

## 2021-10-09 LAB — SARS-COV-2 RNA RESP QL NAA+PROBE: NEGATIVE

## 2021-10-23 ENCOUNTER — HEALTH MAINTENANCE LETTER (OUTPATIENT)
Age: 61
End: 2021-10-23

## 2021-11-19 NOTE — TELEPHONE ENCOUNTER
FUTURE VISIT INFORMATION      FUTURE VISIT INFORMATION:    Date: 2/14/22    Time: 1 PM with SLP    Location: [facility] [place]  REFERRAL INFORMATION:    Referring provider: Self-Referred    Referring providers clinic:    Reason for visit/diagnosis: Irritable Larynx    RECORDS REQUESTED FROM:       Clinic name Comments Records Status Imaging Status   [facility] 10/7/21 - UC OV with Dr. Bisi Garvin 8/2/19 - PCC OV with DOMENICO Ashton  2/1/18 - [facility] 8/3/18 - SPEECH OV with LANDEN Russell  6/18/18 - ENT OV with Dr. Maloney  5/12/15 - ENT OV with Dr. Collado [facility] - imaging 2/8/18 - XR Chest  4/17/17 - XR Chest Epic PACs   [facility] 5/11/18 - OV with DOMENICO Whiteside Scanned In    [facility] [place]  7/7/21 - ENT OV with Dr. Thibodeaux [facility] Everywhere

## 2021-12-07 ENCOUNTER — VIRTUAL VISIT (OUTPATIENT)
Dept: FAMILY MEDICINE | Facility: CLINIC | Age: 61
End: 2021-12-07
Payer: COMMERCIAL

## 2021-12-07 DIAGNOSIS — S09.90XA INJURY OF HEAD, INITIAL ENCOUNTER: Primary | ICD-10-CM

## 2021-12-07 PROCEDURE — 99213 OFFICE O/P EST LOW 20 MIN: CPT | Mod: GT | Performed by: STUDENT IN AN ORGANIZED HEALTH CARE EDUCATION/TRAINING PROGRAM

## 2021-12-07 RX ORDER — ESCITALOPRAM OXALATE 5 MG/1
5 TABLET ORAL DAILY
COMMUNITY
Start: 2021-11-22 | End: 2024-06-26
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
## 2022-02-11 ENCOUNTER — TELEPHONE (OUTPATIENT)
Dept: OTOLARYNGOLOGY | Facility: CLINIC | Age: 62
End: 2022-02-11
Payer: COMMERCIAL

## 2022-02-11 NOTE — TELEPHONE ENCOUNTER
Writer contacted patient to confirm appointment with Dr. Maloney 2/14/22 1:00 PM. Patient confirmed.    Carlos Durham, EMT

## 2022-02-12 ENCOUNTER — HEALTH MAINTENANCE LETTER (OUTPATIENT)
Age: 62
End: 2022-02-12

## 2022-02-14 ENCOUNTER — OFFICE VISIT (OUTPATIENT)
Dept: OTOLARYNGOLOGY | Facility: CLINIC | Age: 62
End: 2022-02-14
Payer: COMMERCIAL

## 2022-02-14 ENCOUNTER — PRE VISIT (OUTPATIENT)
Dept: OTOLARYNGOLOGY | Facility: CLINIC | Age: 62
End: 2022-02-14

## 2022-02-14 ENCOUNTER — VIRTUAL VISIT (OUTPATIENT)
Dept: OTOLARYNGOLOGY | Facility: CLINIC | Age: 62
End: 2022-02-14
Payer: COMMERCIAL

## 2022-02-14 VITALS
HEART RATE: 107 BPM | DIASTOLIC BLOOD PRESSURE: 78 MMHG | HEIGHT: 65 IN | OXYGEN SATURATION: 95 % | WEIGHT: 150 LBS | TEMPERATURE: 98.2 F | SYSTOLIC BLOOD PRESSURE: 129 MMHG | BODY MASS INDEX: 24.99 KG/M2

## 2022-02-14 DIAGNOSIS — R49.0 DYSPHONIA: Primary | ICD-10-CM

## 2022-02-14 DIAGNOSIS — R07.0 THROAT DISCOMFORT: Primary | ICD-10-CM

## 2022-02-14 DIAGNOSIS — M54.2 CERVICALGIA: ICD-10-CM

## 2022-02-14 DIAGNOSIS — R07.0 THROAT PAIN: ICD-10-CM

## 2022-02-14 DIAGNOSIS — Z85.3 PERSONAL HISTORY OF BREAST CANCER: ICD-10-CM

## 2022-02-14 PROCEDURE — 92524 BEHAVRAL QUALIT ANALYS VOICE: CPT | Mod: GN | Performed by: SPEECH-LANGUAGE PATHOLOGIST

## 2022-02-14 PROCEDURE — 99204 OFFICE O/P NEW MOD 45 MIN: CPT | Mod: 25 | Performed by: OTOLARYNGOLOGY

## 2022-02-14 PROCEDURE — 31575 DIAGNOSTIC LARYNGOSCOPY: CPT | Performed by: OTOLARYNGOLOGY

## 2022-02-14 ASSESSMENT — MIFFLIN-ST. JEOR: SCORE: 1251.28

## 2022-02-14 ASSESSMENT — PAIN SCALES - GENERAL: PAINLEVEL: NO PAIN (0)

## 2022-02-14 NOTE — LETTER
2022      RE: Shelia Avendaño  [address] [place] [id number]       Dear Colleague:    I had the pleasure of meeting Shelia Avendaño in consultation at the [facility] of the [facility] on a self-referred basis, for evaluation of throat pain. The note from our visit follows. Speech recognition software may have been used in the documentation below; input is reviewed before signature to the best of my ability. I appreciate the opportunity to participate in the care of this pleasant patient.    Please feel free to contact me with any questions.    Sincerely yours,      Danielle Maloney M.D., M.P.H.  , Laryngology  Director, [facility]  Otolaryngology- Head & Neck Surgery  [phone]      =====    HISTORY OF PRESENT ILLNESS:   Shelia Avendaño is a pleasant 61-year-old female with a history of Lyme and irritable larynx syndrome, who presents with a several month history of throat concerns.       Voice  No concerns.       Swallowing  It feels more difficult to swallow large liquid boluses, or thicker things like peanut butter sandwiches. She has no regurgitation, no sense of things going down the wrong tube, and no unexplained weight loss.      Cough/Throat-clearing  No concerns.       Breathing  No concerns.       Throat discomfort  In 2020, she had a sudden onset of right throat pain with no obvious inciting event. This has persisted. It is possibly worse with heavy voice use, but not definitively. She has partially improved over the past few months. She would like to make sure that it is not cancer.      Reflux-type symptoms  She experiences heartburn/indigestion rarely. She is not taking reflux medications.    Prior Epic/ outside records were reviewed for this visit, includin/3/18, Deanna Turner, PhD, CCC-SLP   18, me  5/12/15, Dr Collado    MEDICATIONS:     Current Outpatient  Medications   Medication Sig Dispense Refill     ACYCLOVIR PO Take 100 mg by mouth daily        amitriptyline (ELAVIL) 10 MG tablet Take  by mouth At Bedtime.       calcium-magnesium (CALMAG) 500-250 MG TABS Take 1 tablet by mouth daily       escitalopram (LEXAPRO) 5 MG tablet Take 5 mg by mouth daily       Levothyroxine Sodium (SYNTHROID PO) Take 188 mcg by mouth daily . 1 mg daily       Liothyronine Sodium (CYTOMEL PO) Take  by mouth. 1/2 tab daily       TAMOXIFEN CITRATE PO  (Patient not taking: Reported on 2/14/2022)       valACYclovir (VALTREX) 1000 mg tablet Take 1 tablet (1,000 mg) by mouth 3 times daily for 7 days 21 tablet 0       ALLERGIES:    Allergies   Allergen Reactions     Other Environmental Allergy      PN: LW CM1: >>> NO CONTRAST ADVERSE REACTION <<<     Reaction :       PAST MEDICAL HISTORY:   Past Medical History:   Diagnosis Date     Adjustment disorder with mixed anxiety and depressed mood 10/12/2011     Closed fracture of one or more phalanges of foot 1/19/2012     HSV infection 11/8/2017     Hypothyroid 10/8/2010     Osteoarthrosis, hand 7/20/2006    Overview:  LW Onset:  1990s ; DJD Hand     Osteopenia      Sinus problem         PAST SURGICAL HISTORY:   Past Surgical History:   Procedure Laterality Date     TONSILLECTOMY         HABITS/SOCIAL HISTORY:    Social History     Tobacco Use     Smoking status: Never Smoker     Smokeless tobacco: Never Used   Substance Use Topics     Alcohol use: No     Alcohol/week: 0.0 standard drinks         FAMILY HISTORY:    Family History   Problem Relation Age of Onset     Prostate Cancer Father 55     Thyroid Disease Mother       REVIEW OF SYSTEMS:  Comprehensive 11 point review of systems was reviewed. Positives are as noted below; pertinent findings are as noted in the HPI.     Patient Supplied Answers to Review of Systems  [facility] ENT ROS 6/18/2018   Constitutional Problems with sleep   Ears, Nose, Throat Ringing/noise in ears, Nasal congestion or drainage,  "Sore throat, Trouble swallowing   Musculoskeletal Sore or stiff joints       PHYSICAL EXAMINATION:  General: The patient was alert and conversant, and in no acute distress.    Eyes: PERRL, conjunctiva and lids normal, sclera nonicteric. 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  Nose: Anterior rhinoscopy: no gross abnormalities. no  bleeding; no  mucopurulence; septum grossly normal, mild mucoid drainage and/or crusting.  Oral cavity/oropharynx: No masses or lesions. Dentition in good condition. Floor of mouth and oral tongue soft to palpation. Tongue mobility and palate elevation intact and symmetric.  Ears: Normal auricles, external auditory canals bilaterally. Visualized portions of tympanic membranes normal bilaterally.   Neck: No palpable cervical lymphadenopathy. There was severe  tenderness to palpation of the thyrohyoid space, which was narrow. Diffuse tightness and tenderness to palpation of neck muscles. No obvious thyroid abnormality. Landmarks palpable.  Resp: Breathing comfortably, no stridor or stertor.  Neuro: Symmetric facial function. Other cranial nerves as documented above.  Psych: Normal affect, pleasant and cooperative.  Voice/speech: No significant dysphonia.  Extremities: No cyanosis, clubbing, or edema of the upper extremities.    Intake scores  Total Score for Last Patient-Answered VHI Questionnaire  VHI Total Score 6/18/2018   VHI Total Score 3     Total Score for Last Patient-Answered EAT Questionnaire  EAT Total Score 6/18/2018   EAT Total Score 3     Total Score for Last Patient-Answered CSI Questionnaire  CSI Total Score 6/18/2018   CSI Total Score 2       PROCEDURE:   Flexible fiberoptic laryngoscopy  Indications: This procedure was warranted to evaluate the patient's laryngeal anatomy and function. Risks, benefits, and alternatives were discussed.  Description: After written informed " consent was obtained, a time-out was performed to confirm patient identity, procedure, and procedure site. Topical 3% lidocaine with 0.25% phenylephrine was applied to the nasal cavities. I performed the endoscopy and no complications were apparent.  Performed by: Danielle Maloney MD MPH  SLP: Jh Hobbs, PhD, CCC-SLP   Findings: Normal nasopharynx. Normal base of tongue, valleculae, and epiglottis. The right true vocal fold demonstrated normal mobility. The left true vocal fold demonstrated normal mobility. The medial edges of the vocal folds appeared smooth and straight. No focal mucosal lesions were observed on the true vocal folds. Glissade produced appropriate elongation. There was moderate supraglottic recruitment with connected speech. Mucosa of the false vocal folds, aryepiglottic folds, piriform sinuses, and posterior glottis unremarkable. Airway was patent.   No focal lesions on NBI.  Scattered sticky secretions.                  IMPRESSION AND PLAN:   Shelia Avendaño presents with throat/neck pain of unknown etiology that is gradually improving. Evaluation today was notable for significant muscle tension and tightness of the neck and upper shoulders in general as well as the paralaryngeal musculature. In retrospect the symptom of throat/neck pain began after starting PT of the neck,this  potentially could have contributed to some muscular discomfort. She is understandably interested in making sure she does not have cancer, given her prior history of breast cancer. We discussed that no focal findings are observed today on our laryngeal exam.    We discussed:  1) We could consider a CT scan of the neck with contrast for further evaluation; discussed advantages/disadvantages including increased information about the soft tissues and requirement for radiation. She is thinking she may need a CT scan of the c-spine at some point, and if that is the case, may opt to do them at the same time. In that case,  she will work with her primary care provider for this to streamline scheduling.  2) We could also consider a Gastroenterology referral for esophagoscopy.  3) We discussed the option of speech therapy for circumlaryngeal massage.  Because it has been improving, she will consider her options and let us know how she would like to proceed. She is also mindful of the high costs associated with health care utilization.    I appreciate the opportunity to participate in the care of this patient.     Today's visit required additional screening time, PPE, and cleaning measures to allow for a safe in-person visit, due to the public health emergency.    I spent a total of 48 minutes on 2/14/2022 in chart review, review of tests, patient visit, documentation, care coordination, and/or discussion with other providers about the issues documented above, separate from any documented procedure(s).    Danielle Maloney MD
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
## 2022-02-14 NOTE — PROGRESS NOTES
Dear Colleague:    I had the pleasure of meeting Shelia Avendaño in consultation at the [facility] of the [facility] on a self-referred basis, for evaluation of throat pain. The note from our visit follows. Speech recognition software may have been used in the documentation below; input is reviewed before signature to the best of my ability. I appreciate the opportunity to participate in the care of this pleasant patient.    Please feel free to contact me with any questions.    Sincerely yours,      Danielle Maloney M.D., M.P.H.  , Laryngology  Director, [facility]  Otolaryngology- Head & Neck Surgery  [phone]          =====    HISTORY OF PRESENT ILLNESS:   Shelia Avendaño is a pleasant 61-year-old female with a history of Lyme and irritable larynx syndrome, who presents with a several month history of throat concerns.       Voice  No concerns.       Swallowing  It feels more difficult to swallow large liquid boluses, or thicker things like peanut butter sandwiches. She has no regurgitation, no sense of things going down the wrong tube, and no unexplained weight loss.      Cough/Throat-clearing  No concerns.       Breathing  No concerns.       Throat discomfort  In 2020, she had a sudden onset of right throat pain with no obvious inciting event. This has persisted. It is possibly worse with heavy voice use, but not definitively. She has partially improved over the past few months. She would like to make sure that it is not cancer.      Reflux-type symptoms  She experiences heartburn/indigestion rarely. She is not taking reflux medications.    Prior Epic/ outside records were reviewed for this visit, includin/3/18, Deanna Turner, PhD, CCC-SLP   18, me  5/12/15, Dr Collado    MEDICATIONS:     Current Outpatient Medications   Medication Sig Dispense Refill     ACYCLOVIR PO Take 100 mg by mouth  daily        amitriptyline (ELAVIL) 10 MG tablet Take  by mouth At Bedtime.       calcium-magnesium (CALMAG) 500-250 MG TABS Take 1 tablet by mouth daily       escitalopram (LEXAPRO) 5 MG tablet Take 5 mg by mouth daily       Levothyroxine Sodium (SYNTHROID PO) Take 188 mcg by mouth daily . 1 mg daily       Liothyronine Sodium (CYTOMEL PO) Take  by mouth. 1/2 tab daily       TAMOXIFEN CITRATE PO  (Patient not taking: Reported on 2/14/2022)       valACYclovir (VALTREX) 1000 mg tablet Take 1 tablet (1,000 mg) by mouth 3 times daily for 7 days 21 tablet 0       ALLERGIES:    Allergies   Allergen Reactions     Other Environmental Allergy      PN: LW CM1: >>> NO CONTRAST ADVERSE REACTION <<<     Reaction :       PAST MEDICAL HISTORY:   Past Medical History:   Diagnosis Date     Adjustment disorder with mixed anxiety and depressed mood 10/12/2011     Closed fracture of one or more phalanges of foot 1/19/2012     HSV infection 11/8/2017     Hypothyroid 10/8/2010     Osteoarthrosis, hand 7/20/2006    Overview:  LW Onset:  1990s ; DJD Hand     Osteopenia      Sinus problem         PAST SURGICAL HISTORY:   Past Surgical History:   Procedure Laterality Date     TONSILLECTOMY         HABITS/SOCIAL HISTORY:    Social History     Tobacco Use     Smoking status: Never Smoker     Smokeless tobacco: Never Used   Substance Use Topics     Alcohol use: No     Alcohol/week: 0.0 standard drinks         FAMILY HISTORY:    Family History   Problem Relation Age of Onset     Prostate Cancer Father 55     Thyroid Disease Mother       REVIEW OF SYSTEMS:  Comprehensive 11 point review of systems was reviewed. Positives are as noted below; pertinent findings are as noted in the HPI.     Patient Supplied Answers to Review of Systems  [facility] ROS 6/18/2018   Constitutional Problems with sleep   Ears, Nose, Throat Ringing/noise in ears, Nasal congestion or drainage, Sore throat, Trouble swallowing   Musculoskeletal Sore or stiff joints  "      PHYSICAL EXAMINATION:  General: The patient was alert and conversant, and in no acute distress.    Eyes: PERRL, conjunctiva and lids normal, sclera nonicteric. 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  Nose: Anterior rhinoscopy: no gross abnormalities. no  bleeding; no  mucopurulence; septum grossly normal, mild mucoid drainage and/or crusting.  Oral cavity/oropharynx: No masses or lesions. Dentition in good condition. Floor of mouth and oral tongue soft to palpation. Tongue mobility and palate elevation intact and symmetric.  Ears: Normal auricles, external auditory canals bilaterally. Visualized portions of tympanic membranes normal bilaterally.   Neck: No palpable cervical lymphadenopathy. There was severe  tenderness to palpation of the thyrohyoid space, which was narrow. Diffuse tightness and tenderness to palpation of neck muscles. No obvious thyroid abnormality. Landmarks palpable.  Resp: Breathing comfortably, no stridor or stertor.  Neuro: Symmetric facial function. Other cranial nerves as documented above.  Psych: Normal affect, pleasant and cooperative.  Voice/speech: No significant dysphonia.  Extremities: No cyanosis, clubbing, or edema of the upper extremities.    Intake scores  Total Score for Last Patient-Answered VHI Questionnaire  VHI Total Score 6/18/2018   VHI Total Score 3     Total Score for Last Patient-Answered EAT Questionnaire  EAT Total Score 6/18/2018   EAT Total Score 3     Total Score for Last Patient-Answered CSI Questionnaire  CSI Total Score 6/18/2018   CSI Total Score 2       PROCEDURE:   Flexible fiberoptic laryngoscopy  Indications: This procedure was warranted to evaluate the patient's laryngeal anatomy and function. Risks, benefits, and alternatives were discussed.  Description: After written informed consent was obtained, a time-out was performed to confirm patient identity, " procedure, and procedure site. Topical 3% lidocaine with 0.25% phenylephrine was applied to the nasal cavities. I performed the endoscopy and no complications were apparent.  Performed by: Danielle Maloney MD MPH  SLP: Jh Hobbs, PhD, CCC-SLP   Findings: Normal nasopharynx. Normal base of tongue, valleculae, and epiglottis. The right true vocal fold demonstrated normal mobility. The left true vocal fold demonstrated normal mobility. The medial edges of the vocal folds appeared smooth and straight. No focal mucosal lesions were observed on the true vocal folds. Glissade produced appropriate elongation. There was moderate supraglottic recruitment with connected speech. Mucosa of the false vocal folds, aryepiglottic folds, piriform sinuses, and posterior glottis unremarkable. Airway was patent.   No focal lesions on NBI.  Scattered sticky secretions.                  IMPRESSION AND PLAN:   Shelia Avendaño presents with throat/neck pain of unknown etiology that is gradually improving. Evaluation today was notable for significant muscle tension and tightness of the neck and upper shoulders in general as well as the paralaryngeal musculature. In retrospect the symptom of throat/neck pain began after starting PT of the neck,this  potentially could have contributed to some muscular discomfort. She is understandably interested in making sure she does not have cancer, given her prior history of breast cancer. We discussed that no focal findings are observed today on our laryngeal exam.    We discussed:  1) We could consider a CT scan of the neck with contrast for further evaluation; discussed advantages/disadvantages including increased information about the soft tissues and requirement for radiation. She is thinking she may need a CT scan of the c-spine at some point, and if that is the case, may opt to do them at the same time. In that case, she will work with her primary care provider for this to streamline  scheduling.  2) We could also consider a Gastroenterology referral for esophagoscopy.  3) We discussed the option of speech therapy for circumlaryngeal massage.  Because it has been improving, she will consider her options and let us know how she would like to proceed. She is also mindful of the high costs associated with health care utilization.    I appreciate the opportunity to participate in the care of this patient.     Today's visit required additional screening time, PPE, and cleaning measures to allow for a safe in-person visit, due to the public health emergency.    I spent a total of 48 minutes on 2/14/2022 in chart review, review of tests, patient visit, documentation, care coordination, and/or discussion with other providers about the issues documented above, separate from any documented procedure(s).
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
## 2022-02-14 NOTE — PATIENT INSTRUCTIONS
Patient needs to be scheduled for a Return Voice SLP appointment with Jh Hobbs PhD CCC-SLP    Number and Frequency of sessions:  3 appointments with approximately 14 days between each. At least 1 should be in person.    NOTE, UNLESS SPECIFICALLY STATED IN SPECIAL INSTRUCTIONS ABOVE    Virtual appointments may be scheduled during in person times (but not the reverse)    When availability is limited the first appointment may be scheduled even if subsequent appointments aren't available in the prescribed timeframe (e.g. 14 days between appointments)    There should be at least 7 days between appointments    Once a patient has begun therapy they should only see that specific SLP (this does not include initial or follow-up evaluation)    Patient may schedule as many or as few of the sessions listed above as they desire      Next steps:  If symptoms continue to improve, you don't need to do anything.  If, in several weeks, symptoms are still present, continue with your scheduled speech therapy AND  Reach out to Dr. Maloney's clinic to schedule a CT soft tissue scan of your neck. (May want to coordinate with other physician who is managing back of neck pain). Also consider a referral to GI for further evaluation of the esophagus(Dr Maloney can also place this order).

## 2022-02-14 NOTE — NURSING NOTE
"Chief Complaint   Patient presents with     Consult     New patient       Blood pressure 129/78, pulse 107, temperature 98.2  F (36.8  C), temperature source Temporal, height 1.651 m (5' 5\"), weight 68 kg (150 lb), last menstrual period 08/28/2013, SpO2 95 %, not currently breastfeeding.    Carlos Durham, EMT  "

## 2022-02-14 NOTE — PATIENT INSTRUCTIONS
1.  You were seen in the ENT Clinic today by . If you have any questions or concerns after your appointment, please call [phone]. Press option #1 for scheduling related needs. Press option #3 for Nurse advice.    2.   has recommended the following:   - consider CT of neck   - consider GI referral   - consider speech therapy    3.  Plan is to return to clinic as needed or sooner with any new or worsening symptoms      Edel Tamayo LPN  [phone]  [facility] Otolaryngology

## 2022-07-07 ENCOUNTER — HOSPITAL ENCOUNTER (OUTPATIENT)
Facility: CLINIC | Age: 62
Discharge: HOME | End: 2022-07-07
Attending: ORTHOPAEDIC SURGERY | Admitting: ORTHOPAEDIC SURGERY
Payer: COMMERCIAL

## 2022-07-07 ENCOUNTER — ANESTHESIA (OUTPATIENT)
Dept: SURGERY | Facility: CLINIC | Age: 62
End: 2022-07-07
Payer: COMMERCIAL

## 2022-07-07 ENCOUNTER — ANESTHESIA EVENT (OUTPATIENT)
Dept: SURGERY | Facility: CLINIC | Age: 62
End: 2022-07-07
Payer: COMMERCIAL

## 2022-07-07 ENCOUNTER — APPOINTMENT (OUTPATIENT)
Dept: GENERAL RADIOLOGY | Facility: CLINIC | Age: 62
End: 2022-07-07
Attending: ORTHOPAEDIC SURGERY
Payer: COMMERCIAL

## 2022-07-07 VITALS
HEIGHT: 66 IN | DIASTOLIC BLOOD PRESSURE: 74 MMHG | OXYGEN SATURATION: 100 % | BODY MASS INDEX: 23.98 KG/M2 | HEART RATE: 65 BPM | WEIGHT: 149.2 LBS | RESPIRATION RATE: 16 BRPM | TEMPERATURE: 97.6 F | SYSTOLIC BLOOD PRESSURE: 149 MMHG

## 2022-07-07 PROCEDURE — 250N000011 HC RX IP 250 OP 636

## 2022-07-07 PROCEDURE — 250N000011 HC RX IP 250 OP 636: Performed by: NURSE ANESTHETIST, CERTIFIED REGISTERED

## 2022-07-07 PROCEDURE — 250N000011 HC RX IP 250 OP 636: Performed by: PHYSICIAN ASSISTANT

## 2022-07-07 PROCEDURE — 999N000141 HC STATISTIC PRE-PROCEDURE NURSING ASSESSMENT: Performed by: ORTHOPAEDIC SURGERY

## 2022-07-07 PROCEDURE — 250N000009 HC RX 250: Performed by: ANESTHESIOLOGY

## 2022-07-07 PROCEDURE — 710N000012 HC RECOVERY PHASE 2, PER MINUTE: Performed by: ORTHOPAEDIC SURGERY

## 2022-07-07 PROCEDURE — C1713 ANCHOR/SCREW BN/BN,TIS/BN: HCPCS | Performed by: ORTHOPAEDIC SURGERY

## 2022-07-07 PROCEDURE — 370N000017 HC ANESTHESIA TECHNICAL FEE, PER MIN: Performed by: ORTHOPAEDIC SURGERY

## 2022-07-07 PROCEDURE — 999N000179 XR SURGERY CARM FLUORO LESS THAN 5 MIN W STILLS: Mod: TC

## 2022-07-07 PROCEDURE — 272N000001 HC OR GENERAL SUPPLY STERILE: Performed by: ORTHOPAEDIC SURGERY

## 2022-07-07 PROCEDURE — 360N000084 HC SURGERY LEVEL 4 W/ FLUORO, PER MIN: Performed by: ORTHOPAEDIC SURGERY

## 2022-07-07 PROCEDURE — 258N000003 HC RX IP 258 OP 636: Performed by: ANESTHESIOLOGY

## 2022-07-07 PROCEDURE — 710N000009 HC RECOVERY PHASE 1, LEVEL 1, PER MIN: Performed by: ORTHOPAEDIC SURGERY

## 2022-07-07 DEVICE — IMP WIRE KIRSCHNER 0.062X9" 78.2530: Type: IMPLANTABLE DEVICE | Site: WRIST | Status: FUNCTIONAL

## 2022-07-07 DEVICE — IMP PEG HANDINN 2.5X24MM PT TP24000: Type: IMPLANTABLE DEVICE | Site: WRIST | Status: FUNCTIONAL

## 2022-07-07 DEVICE — IMP PLATE HANDINN VOLAR SHORT LT DVRASL: Type: IMPLANTABLE DEVICE | Site: WRIST | Status: FUNCTIONAL

## 2022-07-07 DEVICE — IMP PEG HANDINN 2.5X22MM PT TP22000: Type: IMPLANTABLE DEVICE | Site: WRIST | Status: FUNCTIONAL

## 2022-07-07 DEVICE — IMP WIRE KIRSCHNER 0.045X4" 78.2020: Type: IMPLANTABLE DEVICE | Site: WRIST | Status: FUNCTIONAL

## 2022-07-07 DEVICE — IMP SCR CORTICAL HANDINN 3.5X12MM CS12000: Type: IMPLANTABLE DEVICE | Site: WRIST | Status: FUNCTIONAL

## 2022-07-07 DEVICE — IMP WIRE KIRSCHNER 0.035X4" 78.2010: Type: IMPLANTABLE DEVICE | Site: WRIST | Status: FUNCTIONAL

## 2022-07-07 DEVICE — IMPLANTABLE DEVICE: Type: IMPLANTABLE DEVICE | Site: WRIST | Status: FUNCTIONAL

## 2022-07-07 DEVICE — IMP SCR CORTICAL HANDINN 3.5X10MM CS10000: Type: IMPLANTABLE DEVICE | Site: WRIST | Status: FUNCTIONAL

## 2022-07-07 DEVICE — IMP PEG HANDINN 2.5X20MM PT TP20000: Type: IMPLANTABLE DEVICE | Site: WRIST | Status: FUNCTIONAL

## 2022-07-07 RX ORDER — HYDROXYZINE HYDROCHLORIDE 25 MG/1
25 TABLET, FILM COATED ORAL
Status: DISCONTINUED | OUTPATIENT
Start: 2022-07-07 | End: 2022-07-07 | Stop reason: HOSPADM

## 2022-07-07 RX ORDER — OXYCODONE HYDROCHLORIDE 5 MG/1
5 TABLET ORAL
Status: DISCONTINUED | OUTPATIENT
Start: 2022-07-07 | End: 2022-07-07 | Stop reason: HOSPADM

## 2022-07-07 RX ORDER — HYDROMORPHONE HCL IN WATER/PF 6 MG/30 ML
0.4 PATIENT CONTROLLED ANALGESIA SYRINGE INTRAVENOUS EVERY 5 MIN PRN
Status: DISCONTINUED | OUTPATIENT
Start: 2022-07-07 | End: 2022-07-07 | Stop reason: HOSPADM

## 2022-07-07 RX ORDER — FENTANYL CITRATE 50 UG/ML
50 INJECTION, SOLUTION INTRAMUSCULAR; INTRAVENOUS EVERY 5 MIN PRN
Status: DISCONTINUED | OUTPATIENT
Start: 2022-07-07 | End: 2022-07-07 | Stop reason: HOSPADM

## 2022-07-07 RX ORDER — CEFAZOLIN SODIUM/WATER 2 G/20 ML
2 SYRINGE (ML) INTRAVENOUS
Status: COMPLETED | OUTPATIENT
Start: 2022-07-07 | End: 2022-07-07

## 2022-07-07 RX ORDER — LABETALOL HYDROCHLORIDE 5 MG/ML
10 INJECTION, SOLUTION INTRAVENOUS
Status: DISCONTINUED | OUTPATIENT
Start: 2022-07-07 | End: 2022-07-07 | Stop reason: HOSPADM

## 2022-07-07 RX ORDER — ONDANSETRON 4 MG/1
4 TABLET, ORALLY DISINTEGRATING ORAL EVERY 30 MIN PRN
Status: DISCONTINUED | OUTPATIENT
Start: 2022-07-07 | End: 2022-07-07 | Stop reason: HOSPADM

## 2022-07-07 RX ORDER — OXYCODONE HYDROCHLORIDE 5 MG/1
5 TABLET ORAL EVERY 4 HOURS PRN
Status: DISCONTINUED | OUTPATIENT
Start: 2022-07-07 | End: 2022-07-07 | Stop reason: HOSPADM

## 2022-07-07 RX ORDER — FENTANYL CITRATE 50 UG/ML
INJECTION, SOLUTION INTRAMUSCULAR; INTRAVENOUS PRN
Status: DISCONTINUED | OUTPATIENT
Start: 2022-07-07 | End: 2022-07-07

## 2022-07-07 RX ORDER — PROPOFOL 10 MG/ML
INJECTION, EMULSION INTRAVENOUS PRN
Status: DISCONTINUED | OUTPATIENT
Start: 2022-07-07 | End: 2022-07-07

## 2022-07-07 RX ORDER — ONDANSETRON 2 MG/ML
4 INJECTION INTRAMUSCULAR; INTRAVENOUS EVERY 30 MIN PRN
Status: DISCONTINUED | OUTPATIENT
Start: 2022-07-07 | End: 2022-07-07 | Stop reason: HOSPADM

## 2022-07-07 RX ORDER — ACETAMINOPHEN 325 MG/1
650 TABLET ORAL
Status: DISCONTINUED | OUTPATIENT
Start: 2022-07-07 | End: 2022-07-07 | Stop reason: HOSPADM

## 2022-07-07 RX ORDER — HYDRALAZINE HYDROCHLORIDE 20 MG/ML
2.5-5 INJECTION INTRAMUSCULAR; INTRAVENOUS EVERY 10 MIN PRN
Status: DISCONTINUED | OUTPATIENT
Start: 2022-07-07 | End: 2022-07-07 | Stop reason: HOSPADM

## 2022-07-07 RX ORDER — BUPIVACAINE HYDROCHLORIDE AND EPINEPHRINE 5; 5 MG/ML; UG/ML
INJECTION, SOLUTION PERINEURAL
Status: COMPLETED | OUTPATIENT
Start: 2022-07-07 | End: 2022-07-07

## 2022-07-07 RX ORDER — SODIUM CHLORIDE, SODIUM LACTATE, POTASSIUM CHLORIDE, CALCIUM CHLORIDE 600; 310; 30; 20 MG/100ML; MG/100ML; MG/100ML; MG/100ML
INJECTION, SOLUTION INTRAVENOUS CONTINUOUS
Status: DISCONTINUED | OUTPATIENT
Start: 2022-07-07 | End: 2022-07-07 | Stop reason: HOSPADM

## 2022-07-07 RX ORDER — CEFAZOLIN SODIUM/WATER 2 G/20 ML
2 SYRINGE (ML) INTRAVENOUS SEE ADMIN INSTRUCTIONS
Status: DISCONTINUED | OUTPATIENT
Start: 2022-07-07 | End: 2022-07-07 | Stop reason: HOSPADM

## 2022-07-07 RX ORDER — LIDOCAINE 40 MG/G
CREAM TOPICAL
Status: DISCONTINUED | OUTPATIENT
Start: 2022-07-07 | End: 2022-07-07 | Stop reason: HOSPADM

## 2022-07-07 RX ORDER — ALBUTEROL SULFATE 0.83 MG/ML
2.5 SOLUTION RESPIRATORY (INHALATION) EVERY 4 HOURS PRN
Status: DISCONTINUED | OUTPATIENT
Start: 2022-07-07 | End: 2022-07-07 | Stop reason: HOSPADM

## 2022-07-07 RX ORDER — PROPOFOL 10 MG/ML
INJECTION, EMULSION INTRAVENOUS CONTINUOUS PRN
Status: DISCONTINUED | OUTPATIENT
Start: 2022-07-07 | End: 2022-07-07

## 2022-07-07 RX ORDER — OXYCODONE HYDROCHLORIDE 5 MG/1
5 TABLET ORAL EVERY 4 HOURS PRN
COMMUNITY
End: 2024-06-26

## 2022-07-07 RX ORDER — FENTANYL CITRATE 50 UG/ML
50 INJECTION, SOLUTION INTRAMUSCULAR; INTRAVENOUS
Status: DISCONTINUED | OUTPATIENT
Start: 2022-07-07 | End: 2022-07-07 | Stop reason: HOSPADM

## 2022-07-07 RX ADMIN — FENTANYL CITRATE 100 MCG: 50 INJECTION, SOLUTION INTRAMUSCULAR; INTRAVENOUS at 13:06

## 2022-07-07 RX ADMIN — MIDAZOLAM 2 MG: 1 INJECTION INTRAMUSCULAR; INTRAVENOUS at 13:06

## 2022-07-07 RX ADMIN — SODIUM CHLORIDE, POTASSIUM CHLORIDE, SODIUM LACTATE AND CALCIUM CHLORIDE: 600; 310; 30; 20 INJECTION, SOLUTION INTRAVENOUS at 12:59

## 2022-07-07 RX ADMIN — PROPOFOL 200 MCG/KG/MIN: 10 INJECTION, EMULSION INTRAVENOUS at 14:50

## 2022-07-07 RX ADMIN — FENTANYL CITRATE 50 MCG: 50 INJECTION, SOLUTION INTRAMUSCULAR; INTRAVENOUS at 16:55

## 2022-07-07 RX ADMIN — PROPOFOL 30 MG: 10 INJECTION, EMULSION INTRAVENOUS at 16:55

## 2022-07-07 RX ADMIN — LIDOCAINE HYDROCHLORIDE 20 MG: 10 INJECTION, SOLUTION EPIDURAL; INFILTRATION; INTRACAUDAL; PERINEURAL at 14:49

## 2022-07-07 RX ADMIN — Medication 2 G: at 14:43

## 2022-07-07 RX ADMIN — BUPIVACAINE HYDROCHLORIDE AND EPINEPHRINE 10 ML: 5; 5 INJECTION, SOLUTION PERINEURAL at 13:13

## 2022-07-07 RX ADMIN — BUPIVACAINE HYDROCHLORIDE AND EPINEPHRINE BITARTRATE 30 ML: 5; .005 INJECTION, SOLUTION EPIDURAL; INTRACAUDAL; PERINEURAL at 13:07

## 2022-07-07 RX ADMIN — MIDAZOLAM 2 MG: 1 INJECTION INTRAMUSCULAR; INTRAVENOUS at 14:47

## 2022-07-07 NOTE — ANESTHESIA PROCEDURE NOTES
Other (intercostobrachial) Procedure Note    Pre-Procedure   Staff -        Anesthesiologist:  Lisa Gambino MD       Performed By: anesthesiologist       Location: pre-op       Procedure Start/Stop Times: 7/7/2022 1:13 PM and 7/7/2022 1:14 PM       Pre-Anesthestic Checklist: patient identified, IV checked, site marked, risks and benefits discussed, informed consent, monitors and equipment checked, pre-op evaluation and at physician/surgeon's request  Timeout:       Correct Patient: Yes        Correct Procedure: Yes        Correct Site: Yes        Correct Position: Yes        Correct Laterality: Yes        Site Marked: Yes  Procedure Documentation  Procedure: Other (intercostobrachial)       Laterality: left       Skin prep: Chloraprep       Needle Type: short bevel       Needle Gauge: 22.   Not ultrasound guided    Assessment/Narrative         The placement was negative for: blood aspirated and painful injection       Paresthesias: No.       Bolus given via needle..        Secured via.        Insertion/Infusion Method: Single Shot       Complications: none    Medication(s) Administered   Bupivacaine 0.5% w/ 1:200K Epi (Other) - Other   10 mL - 7/7/2022 1:13:00 PM  Medication Administration Time: 7/7/2022 1:13 PM

## 2022-07-07 NOTE — ANESTHESIA PROCEDURE NOTES
Brachial plexus Procedure Note    Pre-Procedure   Staff -        Anesthesiologist:  Lisa Gambino MD       Performed By: anesthesiologist       Location: pre-op       Procedure Start/Stop Times: 7/7/2022 1:07 PM and 7/7/2022 1:13 PM       Pre-Anesthestic Checklist: patient identified, IV checked, site marked, risks and benefits discussed, informed consent, monitors and equipment checked, pre-op evaluation and at physician/surgeon's request  Timeout:       Correct Patient: Yes        Correct Procedure: Yes        Correct Site: Yes        Correct Position: Yes        Correct Laterality: Yes        Site Marked: Yes  Procedure Documentation  Procedure: Brachial plexus       Laterality: left       Patient Position: sitting       Skin prep: Betadine (supraclavicular approach).       Needle Type: short bevel       Needle Gauge: 20.        Needle Length (Inches): 4        Ultrasound guided       1. Ultrasound was used to identify targeted nerve, plexus, vascular marker, or fascial plane and place a needle adjacent to it in real-time.       2. Ultrasound was used to visualize the spread of anesthetic in close proximity to the above referenced structure.       4. The visualized anatomic structures appeared normal.       5. There were no apparent abnormal pathologic findings.    Assessment/Narrative         The placement was negative for: blood aspirated and painful injection       Paresthesias: No.       Bolus given via needle..        Secured via.        Insertion/Infusion Method: Single Shot       Complications: none       Injection made incrementally with aspirations every 5 mL.    Medication(s) Administered   Bupivacaine 0.5% w/ 1:200K Epi (Other) - Other   30 mL - 7/7/2022 1:07:00 PM  Medication Administration Time: 7/7/2022 1:07 PM

## 2022-07-07 NOTE — ANESTHESIA POSTPROCEDURE EVALUATION
Patient: Shelia Avendaño    Procedure: Procedure(s):  OPEN REDUCTION INTERNAL FIXATION, FRACTURE, WRIST (VOLAR PLATING DISTAL RADIUS)       Anesthesia Type:  Peripheral Nerve Block    Note:  Disposition: Outpatient   Postop Pain Control: Uneventful            Sign Out: Well controlled pain   PONV: No   Neuro/Psych: Uneventful            Sign Out: Acceptable/Baseline neuro status   Airway/Respiratory: Uneventful            Sign Out: Acceptable/Baseline resp. status   CV/Hemodynamics: Uneventful            Sign Out: Acceptable CV status; No obvious hypovolemia; No obvious fluid overload   Other NRE: NONE   DID A NON-ROUTINE EVENT OCCUR? No           Last vitals:  Vitals Value Taken Time   /80 07/07/22 1725   Temp 96.5  F (35.8  C) 07/07/22 1730   Pulse 82 07/07/22 1725   Resp     SpO2 97 % 07/07/22 1730   Vitals shown include unvalidated device data.    Electronically Signed By: Lisa Gambino MD  July 7, 2022  5:32 PM

## 2022-07-07 NOTE — ANESTHESIA CARE TRANSFER NOTE
Patient: Shelia Avendaño    Procedure: Procedure(s):  OPEN REDUCTION INTERNAL FIXATION, FRACTURE, WRIST (VOLAR PLATING DISTAL RADIUS)       Diagnosis: Wrist fracture [S62.109A]  Diagnosis Additional Information: No value filed.    Anesthesia Type:   Peripheral Nerve Block     Note:    Oropharynx: oropharynx clear of all foreign objects and spontaneously breathing  Level of Consciousness: drowsy  Oxygen Supplementation: room air    Independent Airway: airway patency satisfactory and stable  Dentition: dentition unchanged  Vital Signs Stable: post-procedure vital signs reviewed and stable  Report to RN Given: handoff report given  Patient transferred to: Phase II  Comments: Pt to PACU.  Exchanging well SPO2 97% on room air.  Report to RN.  Handoff Report: Identifed the Patient, Identified the Reponsible Provider, Reviewed the pertinent medical history, Discussed the surgical course, Reviewed Intra-OP anesthesia mangement and issues during anesthesia, Set expectations for post-procedure period and Allowed opportunity for questions and acknowledgement of understanding      Vitals:  Vitals Value Taken Time   /78 07/07/22 1722   Temp     Pulse 78 07/07/22 1722   Resp     SpO2 98 % 07/07/22 1724   Vitals shown include unvalidated device data.    Electronically Signed By: MARILOU Zavala CRNA  July 7, 2022  5:26 PM

## 2022-07-07 NOTE — BRIEF OP NOTE
"[facility]    Brief Operative Note    Pre-operative diagnosis: Wrist fracture [S62.109A]  Post-operative diagnosis Left comminuted distal radius fracture with ulnar styloid fracture    Procedure: Procedure(s):  OPEN REDUCTION INTERNAL FIXATION, FRACTURE, WRIST (VOLAR PLATING DISTAL RADIUS)  Surgeon: Surgeon(s) and Role:     * Efraín Dalton, DO - Primary     * Desirae Aden PA-C - Assisting  Anesthesia: Choice   Estimated Blood Loss: 25ml    Drains: None  Specimens: * No specimens in log *  Findings:   None.  Complications: None.  Implants:   Implant Name Type Inv. Item Serial No.  Lot No. LRB No. Used Action   IMP PLATE HANDINN VOLAR SHORT LT DVRASL - [id number] Metallic Hardware/[place] IMP PLATE HANDINN VOLAR SHORT LT DVRASL  [employer] 8005 [id number] Left 1 Implanted   IMP SCR CORTICAL HANDINN 3.5X10MM [id number] - [id number] Metallic Hardware/[place] IMP SCR CORTICAL HANDINN 3.5X10MM [id number]  [employer] 8005 [id number] Left 2 Implanted   IMP WIRE CAITY 0.028X4\" 78.2000-24 - [id number] Wire IMP WIRE CAITY 0.028X4\" 78.2000-24  [employer]  Left 1 Used as a Supply   IMP WIRE CAITY 0.035X4\" 78.2010 - [id number]  IMP WIRE CAITY 0.035X4\" 78.2010  [employer]  Left 1 Used as a Supply   IMP PEG HANDINN 2.5X22MM PT [id number] - [id number] Metallic Hardware/[place] IMP PEG HANDINN 2.5X22MM PT [id number]  [employer] 8005 [id number] Left 3 Implanted   IMP PEG HANDINN 2.5X20MM PT [id number] - [id number] Metallic Hardware/[place] IMP PEG HANDINN 2.5X20MM PT [id number]  [employer] 8005 [id number] Left 2 Implanted   IMP WIRE CAITY 0.062X9\" 78.2530 - [id number]  IMP WIRE CAITY 0.062X9\" 78.2530  [employer]  Left 1 Used as a Supply   IMP WIRE CAITY 0.045X4\" 78.2020 - [id number]  IMP WIRE CAITY 0.045X4\" 78.2020  [employer]  Left 1 Implanted   IMP PEG HANDINN 2.5X16MM PT [id number] - [id number] Metallic Hardware/[place] IMP PEG HANDINN 2.5X16MM PT [id number]  " [employer] 8005 [id number] Left 1 Implanted and Explanted   IMP PEG HANDINN 2.5X24MM PT [id number] - [id number] Metallic Hardware/[place] IMP PEG HANDINN 2.5X24MM PT [id number]  [employer] 8005 [id number] Left 2 Implanted   IMP SCR CORTICAL HANDINN 3.5X12MM [id number] - [id number] Metallic Hardware/[place] IMP SCR CORTICAL HANDINN 3.5X12MM [id number]  [employer]  Left 1 Implanted

## 2022-07-07 NOTE — ANESTHESIA PREPROCEDURE EVALUATION
Anesthesia Pre-Procedure Evaluation    Patient: Shelia Avendaño   MRN: [id number] : 1960        Procedure : Procedure(s):  OPEN REDUCTION INTERNAL FIXATION, FRACTURE, WRIST          Past Medical History:   Diagnosis Date     Adjustment disorder with mixed anxiety and depressed mood 10/12/2011     Closed fracture of one or more phalanges of foot 2012     HSV infection 2017     Hypothyroid 10/8/2010     Osteoarthrosis, hand 2006    Overview:  LW Onset:   ; DJD Hand     Osteopenia      Sinus problem       Past Surgical History:   Procedure Laterality Date     TONSILLECTOMY        Allergies   Allergen Reactions     Other Environmental Allergy      PN: LW CM1: >>> NO CONTRAST ADVERSE REACTION <<<     Reaction :      Social History     Tobacco Use     Smoking status: Never Smoker     Smokeless tobacco: Never Used   Substance Use Topics     Alcohol use: No     Alcohol/week: 0.0 standard drinks      Wt Readings from Last 1 Encounters:   22 67.7 kg (149 lb 3.2 oz)        Anesthesia Evaluation            ROS/MED HX  ENT/Pulmonary:  - neg pulmonary ROS     Neurologic:       Cardiovascular:  - neg cardiovascular ROS     METS/Exercise Tolerance:     Hematologic:       Musculoskeletal:   (+) fracture,     GI/Hepatic:       Renal/Genitourinary:       Endo:     (+) thyroid problem, hypothyroidism,     Psychiatric/Substance Use:       Infectious Disease:       Malignancy:   (+) Malignancy, History of Breast.    Other:            Physical Exam    Airway        Mallampati: II   TM distance: > 3 FB   Neck ROM: full     Respiratory Devices and Support         Dental           Cardiovascular   cardiovascular exam normal          Pulmonary   pulmonary exam normal                OUTSIDE LABS:  CBC:   Lab Results   Component Value Date    WBC 7.9 10/07/2021    WBC 5.3 2017    HGB 12.4 10/07/2021    HGB 13.0 2017    HCT 38.8 10/07/2021    HCT 38.7 2017     10/07/2021      12/18/2017     BMP:   Lab Results   Component Value Date     12/18/2017     03/01/2017    POTASSIUM 3.8 12/18/2017    POTASSIUM 3.9 03/01/2017    CHLORIDE 108 12/18/2017    CHLORIDE 106 03/01/2017    CO2 22 12/18/2017    CO2 26 03/01/2017    BUN 17 12/18/2017    BUN 16 03/01/2017    CR 0.64 12/18/2017    CR 0.76 03/01/2017    GLC 94 12/18/2017    GLC 80 03/01/2017     COAGS: No results found for: PTT, INR, FIBR  POC: No results found for: BGM, HCG, HCGS  HEPATIC:   Lab Results   Component Value Date    ALBUMIN 4.1 03/01/2017    PROTTOTAL 7.2 03/01/2017    ALT 25 03/01/2017    AST 18 03/01/2017    ALKPHOS 43 03/01/2017    BILITOTAL 0.3 03/01/2017     OTHER:   Lab Results   Component Value Date    A1C 5.3 12/18/2017    HEAVEN 9.0 12/18/2017       Anesthesia Plan    ASA Status:  2   NPO Status:  NPO Appropriate    Anesthesia Type: Peripheral Nerve Block.      Maintenance: TIVA.        Consents    Anesthesia Plan(s) and associated risks, benefits, and realistic alternatives discussed. Questions answered and patient/representative(s) expressed understanding.     - Discussed: Risks, Benefits and Alternatives for BOTH SEDATION and the PROCEDURE were discussed     - Discussed with:  Patient         Postoperative Care    Pain management: IV analgesics, Oral pain medications, Peripheral nerve block (Single Shot), Multi-modal analgesia.   PONV prophylaxis: Ondansetron (or other 5HT-3), Dexamethasone or Solumedrol     Comments:                Lisa Gambino MD

## 2022-07-07 NOTE — DISCHARGE INSTRUCTIONS
GENERAL ANESTHESIA OR SEDATION ADULT DISCHARGE INSTRUCTIONS   SPECIAL PRECAUTIONS FOR 24 HOURS AFTER SURGERY    IT IS NOT UNUSUAL TO FEEL LIGHT-HEADED OR FAINT, UP TO 24 HOURS AFTER SURGERY OR WHILE TAKING PAIN MEDICATION.  IF YOU HAVE THESE SYMPTOMS; SIT FOR A FEW MINUTES BEFORE STANDING AND HAVE SOMEONE ASSIST YOU WHEN YOU GET UP TO WALK OR USE THE BATHROOM.    YOU SHOULD REST AND RELAX FOR THE NEXT 24 HOURS AND YOU MUST MAKE ARRANGEMENTS TO HAVE SOMEONE STAY WITH YOU FOR AT LEAST 24 HOURS AFTER YOUR DISCHARGE.  AVOID HAZARDOUS AND STRENUOUS ACTIVITIES.  DO NOT MAKE IMPORTANT DECISIONS FOR 24 HOURS.    DO NOT DRIVE ANY VEHICLE OR OPERATE MECHANICAL EQUIPMENT FOR 24 HOURS FOLLOWING THE END OF YOUR SURGERY.  EVEN THOUGH YOU MAY FEEL NORMAL, YOUR REACTIONS MAY BE AFFECTED BY THE MEDICATION YOU HAVE RECEIVED.    DO NOT DRINK ALCOHOLIC BEVERAGES FOR 24 HOURS FOLLOWING YOUR SURGERY.    DRINK CLEAR LIQUIDS (APPLE JUICE, GINGER ALE, 7-UP, BROTH, ETC.).  PROGRESS TO YOUR REGULAR DIET AS YOU FEEL ABLE.    YOU MAY HAVE A DRY MOUTH, A SORE THROAT, MUSCLES ACHES OR TROUBLE SLEEPING.  THESE SHOULD GO AWAY AFTER 24 HOURS.    CALL YOUR DOCTOR FOR ANY OF THE FOLLOWING:  SIGNS OF INFECTION (FEVER, GROWING TENDERNESS AT THE SURGERY SITE, A LARGE AMOUNT OF DRAINAGE OR BLEEDING, SEVERE PAIN, FOUL-SMELLING DRAINAGE, REDNESS OR SWELLING.    IT HAS BEEN OVER 8 TO 10 HOURS SINCE SURGERY AND YOU ARE STILL NOT ABLE TO URINATE (PASS WATER).       HOME CARE INSTRUCTIONS AFTER REGIONAL ANESTHESIA      To do your surgical procedure, your doctor used regional anesthesia to  numb  your arm, leg, or foot. This type of anesthesia will not be completely worn off for 4-24 hours. You should be careful during this time not to injure your extremity.    As the anesthetic wears off, you may have a tingling and prickly sensation as the feeling starts to return. The amount of discomfort you may feel is unpredictable. Use your pain medication as prescribed  or advised by your doctor.    Wear a sling until your arm is completely  awake .    Avoid striking or bumping your arm, leg, or foot while it is numb.    Avoid extremes of hot or cold while it is numb.    Remain quiet and restful the day of surgery. Resume normal activities gradually over the next day or so as advised by your doctor.    Do not drive or operate any machinery until your extremity is fully  awake .        Please notify your doctor of any of the following:    There is excessive bleeding or drainage.  There is increased swelling of the extremity making the dressing too tight, and this is not relieved by elevating extremity.  There is blue coloring to fingers/toes or they are cold to touch.  There is severe pain not relieved by your pain medication.  There is any numbness or tingling of the extremity the following day.

## 2022-07-08 NOTE — OP NOTE
Procedure Date: 07/07/2022    PREOPERATIVE DIAGNOSES:  Left distal radius fracture with ulnar styloid fracture.    POSTOPERATIVE DIAGNOSES:  Comminuted left distal radius fracture with ulnar styloid fracture.    PROCEDURE:  Open reduction and internal fixation of the left distal radius fracture with volar plating.    IMPLANTS:  [employer] 3-hole standard volar distal radius plate with 2.5 mm locking threaded distal pegs x7 and 3.5 mm proximal bicortical screws x3.    SURGEON:  Efraín Dalton DO    ASSISTANT:  Desirae Aden PA-C    A skilled surgical 1st assistant was required for this case to allow safe and efficient completion of the operation.  The assistance of Desirae Aden PA-C, was required and medically necessary for all portions of this case such as but not limited to:  patient positioning, prepping/draping, exposure/retraction, hemostasis, limb positioning/control, anchor/implant placement, closure and dressing/splint application.    ANESTHESIA:  MAC with block.    FLUIDS:  Crystalloid.    ESTIMATED BLOOD LOSS:  25 mL.    TOURNIQUET: 120 minutes at 250 mmHg.    SPECIMENS:  None.    COMPLICATIONS:  None.    INDICATIONS:  The patient is a 61-year-old female who presented to the office after falling off of a chair while standing to reach for something on 06/30/2022.  Imaging studies and physical exam were consistent with a displaced distal radius fracture with subsequent closed reduction at an outside facility.  Conservative and surgical treatment options were discussed with the patient.  The surgical procedure was explained to her in detail as well as postoperative recovery and expectations.  The possible risks and complications of the surgery were explained.  She said she understood all this and wished to proceed with the surgery.    FINDINGS:  There was swelling and ecchymosis of the distal wrist.  The skin was intact.  Intraoperatively, she had tearing into the pronator quadratus with  significant periosteum into the fracture site.  There was comminution of the radial styloid.  Her bone was quite soft.    PROCEDURE IN DETAIL:  The patient was seen in the preoperative area and the procedure confirmed.  A left upper extremity block was performed by the Department of Anesthesia in the preoperative area.  She was then taken to the operative suite and placed supine on the operating room table.  MAC anesthesia was induced by the Department of Anesthesia.  She was given preoperative prophylactic IV antibiotics.  Examination under anesthesia was performed of the left upper extremity after her splint was removed.  A well-padded tourniquet was placed in the left upper arm.  All extremities were well supported and all bony prominences were well padded throughout the procedure.  The left upper extremity was placed on a radiolucent hand table.  The left wrist was prepped and draped freely in the usual sterile fashion.  A timeout was performed to confirm the correct site of surgery.    The left upper extremity was exsanguinated with an Esmarch bandage and the tourniquet inflated to 250 mmHg.  A #15 blade was used to make a longitudinal skin incision from the inferior aspect of the wrist crease directly over the flexor carpi radialis tendon.  A tenotomy scissors was used to expose the flexor carpi radialis tendon.  Care was taken to identify any cutaneous nerves and protect them throughout the procedure.  The tendon sheath overlying the flexor carpi radialis was incised and the tendon was mobilized ulnarly.  Then the floor of the flexor carpi radialis tendon was incised, exposing the deep volar space of the wrist.  This was then mobilized ulnarly to reveal the pronator quadratus.  The pronator was then sharply incised along its radial border and elevated off of the distal radius.  Once it was removed from the fracture, then the transverse arm was brought along the distal portion, taking care not to disrupt the  carpal ligaments.  At this point, the soft nature of her bone and the comminution of the radial styloid was appreciated.  It was felt that further distal fixation was required and the standard size plate was chosen.  A closed reduction could be obtained with a carpal translation procedure along with ulnar deviation.  The fixation of the plate to the proximal radius with reduction of the fracture to the plate was then going to be performed.  The plate was secured in the dynamic hole bicortically with a 3.5 screw.  This was kept slightly loose for mobility of the plate.  The distal aspect of the fracture was then reduced using a carpal translation to the plate, and pins were put into place.  Drill holes x2 were then made with the 2.0 mm drill distally, one up into the radial styloid and the other below the articular surface.  The locking screws were then measured and inserted.  The position of the fracture was viewed under fluoroscopy, and did show some loss of reduction of the radial styloid.  It was felt that the screws then required removal.  The styloid was then more appropriately reduced and pinned into place with a 0.062 K-wire across the fracture site to restore the radial inclination.  Once adequate reduction could be maintained, there was some concern of further manipulation, given her soft bone.  The 2.0 mm drill was then used to place a locking screw up into the styloid and it was secured.  Then the other distal locking screws were also drilled and measured, and the locking threaded pegs inserted and secured.  Then the proximal shaft screws were drilled bicortically, measured, and inserted.  Final fluoroscopic images were viewed to confirm no intraarticular placement of screws.  The wound was copiously irrigated.  The pronator was repaired using 2-0 Vicryl in a figure-of-eight fashion.  The subcutaneous tissue was closed with 2-0 Vicryl in an inverted fashion.  The tourniquet was released, as it had reached  2 hours.  The skin was closed with 4-0 Monocryl in a running subcuticular fashion.  Steri-Strips were applied.  A soft, well-padded dressing was applied and then a volar plaster splint was applied.  A compressive dressing was applied.  All instrument and sharps counts were correct.  She tolerated the procedure well and was transferred to the PACU in stable condition.    PLAN:  As postoperative course, she was to keep the splint on and keep it clean and dry.  She was to ice and elevate the wrist.  She had medication at home for pain, and was going to take that as needed.  This had been given to her in the Emergency Department at an outside hospital.  She was encouraged to work on range of motion of her fingers.  She could wear a sling for comfort.  We would see her for followup in approximately 1-2 weeks for reevaluation.  AP and lateral radiographs would be obtained of her left wrist out of her splint.  She would then be placed into a removable wrist brace.  She would begin outpatient occupational therapy at that time.    Efraín Dalton DO        D: 2022   T: 2022   MT: JANET    Name:     OLESYA LUDWIG  MRN:      [id number]        Account:        [id number]   :      1960           Procedure Date: 2022     Document: [id number]

## 2022-07-11 ENCOUNTER — TELEPHONE (OUTPATIENT)
Dept: OTOLARYNGOLOGY | Facility: CLINIC | Age: 62
End: 2022-07-11

## 2022-10-09 ENCOUNTER — HEALTH MAINTENANCE LETTER (OUTPATIENT)
Age: 62
End: 2022-10-09

## 2022-11-26 ENCOUNTER — HEALTH MAINTENANCE LETTER (OUTPATIENT)
Age: 62
End: 2022-11-26

## 2023-05-19 ENCOUNTER — ANCILLARY PROCEDURE (OUTPATIENT)
Dept: GENERAL RADIOLOGY | Facility: CLINIC | Age: 63
End: 2023-05-19
Attending: NURSE PRACTITIONER
Payer: COMMERCIAL

## 2023-05-19 ENCOUNTER — OFFICE VISIT (OUTPATIENT)
Dept: URGENT CARE | Facility: URGENT CARE | Age: 63
End: 2023-05-19
Payer: COMMERCIAL

## 2023-05-19 VITALS
BODY MASS INDEX: 23.89 KG/M2 | OXYGEN SATURATION: 98 % | WEIGHT: 148 LBS | HEART RATE: 68 BPM | SYSTOLIC BLOOD PRESSURE: 125 MMHG | DIASTOLIC BLOOD PRESSURE: 78 MMHG | TEMPERATURE: 98 F | RESPIRATION RATE: 16 BRPM

## 2023-05-19 DIAGNOSIS — R05.1 ACUTE COUGH: Primary | ICD-10-CM

## 2023-05-19 DIAGNOSIS — J18.9 LUNG INFECTION: ICD-10-CM

## 2023-05-19 DIAGNOSIS — R05.1 ACUTE COUGH: ICD-10-CM

## 2023-05-19 DIAGNOSIS — T17.908A ASPIRATION INTO AIRWAY, INITIAL ENCOUNTER: ICD-10-CM

## 2023-05-19 PROBLEM — N94.10 UNSPECIFIED DYSPAREUNIA: Status: ACTIVE | Noted: 2023-05-19

## 2023-05-19 PROBLEM — Z17.0 ESTROGEN RECEPTOR POSITIVE STATUS (ER+): Status: ACTIVE | Noted: 2023-05-19

## 2023-05-19 PROBLEM — L72.11 PILAR CYST OF SCALP: Status: ACTIVE | Noted: 2023-05-19

## 2023-05-19 PROBLEM — R45.89 DIFFICULTY COPING: Status: ACTIVE | Noted: 2023-05-19

## 2023-05-19 PROCEDURE — 99214 OFFICE O/P EST MOD 30 MIN: CPT | Performed by: NURSE PRACTITIONER

## 2023-05-19 PROCEDURE — 71046 X-RAY EXAM CHEST 2 VIEWS: CPT | Mod: TC | Performed by: RADIOLOGY

## 2023-05-19 RX ORDER — AZITHROMYCIN 250 MG/1
TABLET, FILM COATED ORAL
Qty: 6 TABLET | Refills: 0 | Status: SHIPPED | OUTPATIENT
Start: 2023-05-19 | End: 2023-05-24

## 2023-05-19 NOTE — PROGRESS NOTES
Chief Complaint   Patient presents with     Urgent Care     Cough     X1 week, swallow egg wrong way into the lung and since then coughing, wet cough.      SUBJECTIVE:  Shelia Avendaño is a 62 year old female presenting with concerns for aspiration pneumonia. She choked on an egg for an hour coughing a week ago and now increased cough chest fullness pain tightness.  She feels like the cough is wet but does not produce any mucus that she can see.  No severe shortness of breath hemoptysis fevers sweats chills myalgias fatigue asthma or smoking.  She does have baseline irritable larynx.  Patient is going to [facility] in a couple days.    Past Medical History:   Diagnosis Date     Adjustment disorder with mixed anxiety and depressed mood 10/12/2011     Closed fracture of one or more phalanges of foot 1/19/2012     HSV infection 11/8/2017     Hypothyroid 10/8/2010     Osteoarthrosis, hand 7/20/2006    Overview:  LW Onset:  1990s ; DJD Hand     Osteopenia      Sinus problem      amitriptyline (ELAVIL) 10 MG tablet, Take  by mouth At Bedtime.  escitalopram (LEXAPRO) 5 MG tablet, Take 5 mg by mouth daily  Levothyroxine Sodium (SYNTHROID PO), Take 188 mcg by mouth daily . 1 mg daily  ACYCLOVIR PO, Take 100 mg by mouth daily   calcium-magnesium (CALMAG) 500-250 MG TABS, Take 1 tablet by mouth daily  Liothyronine Sodium (CYTOMEL PO), Take  by mouth. 1/2 tab daily  oxyCODONE (ROXICODONE) 5 MG tablet, Take 5 mg by mouth every 4 hours as needed for severe pain or pain  TAMOXIFEN CITRATE PO,   valACYclovir (VALTREX) 1000 mg tablet, Take 1 tablet (1,000 mg) by mouth 3 times daily for 7 days    No current facility-administered medications on file prior to visit.    Social History     Tobacco Use     Smoking status: Never     Smokeless tobacco: Never   Vaping Use     Vaping status: Not on file   Substance Use Topics     Alcohol use: No     Alcohol/week: 0.0 standard drinks of alcohol     Allergies   Allergen Reactions     Other  Environmental Allergy      PN: LW CM1: >>> NO CONTRAST ADVERSE REACTION <<<     Reaction :       Review of Systems   All systems negative except for those listed above in HPI.    OBJECTIVE:   /78   Pulse 68   Temp 98  F (36.7  C) (Temporal)   Resp 16   Wt 67.1 kg (148 lb)   LMP 08/28/2013 (LMP Unknown)   SpO2 98%   BMI 23.89 kg/m       Physical Exam  Vitals reviewed.   Constitutional:       General: She is not in acute distress.     Appearance: Normal appearance. She is well-developed. She is not ill-appearing, toxic-appearing or diaphoretic.   HENT:      Head: Normocephalic and atraumatic.      Right Ear: Tympanic membrane and ear canal normal.      Left Ear: Tympanic membrane and ear canal normal.      Mouth/Throat:      Pharynx: No oropharyngeal exudate or posterior oropharyngeal erythema.   Eyes:      Conjunctiva/sclera: Conjunctivae normal.      Pupils: Pupils are equal, round, and reactive to light.   Cardiovascular:      Rate and Rhythm: Normal rate.      Pulses: Normal pulses.   Pulmonary:      Effort: Pulmonary effort is normal. No respiratory distress.      Breath sounds: Normal breath sounds. No stridor. No wheezing, rhonchi or rales.   Chest:      Chest wall: No tenderness.   Musculoskeletal:         General: Normal range of motion.      Cervical back: Normal range of motion and neck supple.   Lymphadenopathy:      Cervical: No cervical adenopathy.   Skin:     General: Skin is warm.      Capillary Refill: Capillary refill takes less than 2 seconds.      Findings: No rash.   Neurological:      General: No focal deficit present.      Mental Status: She is alert and oriented to person, place, and time.   Psychiatric:         Mood and Affect: Mood normal.         Behavior: Behavior normal.       X-ray done in clinic read by me as negative for cardiopulmonary abnormality.    ASSESSMENT:    ICD-10-CM    1. Acute cough  R05.1 XR Chest 2 Views      2. Lung infection  J18.9 azithromycin (ZITHROMAX)  250 MG tablet      3. Aspiration into airway, initial encounter  T17.908A azithromycin (ZITHROMAX) 250 MG tablet        PLAN:     Reactive airway from dysphagia  Zpak per patient request, only to start if worsening symptoms of shortness of breath chest pain brown sputum fevers hypoxia <96%  No signs of aspiration pneumonia on x-ray, will call if radiologist sees anything  Recommend soft liquid diet until throat and lungs feel better  Cough deep breathe to open up the airway  Can take Mucinex or Robitussin to thin the mucus  Throat coat tea with honey citrus  Reevaluation if worsening cough fever thick brown bloody sputum shortness of breath chest pain    Follow up with primary care provider with any problems, questions or concerns or if symptoms worsen or fail to improve. Patient agreed to plan and verbalized understanding.    Fidelina James, JT-BC  [facility] [place]

## 2023-05-19 NOTE — PATIENT INSTRUCTIONS
Reactive airway from dysphagia  Zpak per patient request, only to start if worsening symptoms of shortness of breath chest pain brown sputum fevers hypoxia <96%  No signs of aspiration pneumonia on x-ray, will call if radiologist sees anything  Recommend soft liquid diet until throat and lungs feel better  Cough deep breathe to open up the airway  Can take Mucinex or Robitussin to thin the mucus  Throat coat tea with honey citrus  Reevaluation if worsening cough fever thick brown bloody sputum shortness of breath chest pain

## 2024-01-06 ENCOUNTER — HEALTH MAINTENANCE LETTER (OUTPATIENT)
Age: 64
End: 2024-01-06

## 2024-01-09 ENCOUNTER — OFFICE VISIT (OUTPATIENT)
Dept: URGENT CARE | Facility: URGENT CARE | Age: 64
End: 2024-01-09
Payer: COMMERCIAL

## 2024-01-09 VITALS
TEMPERATURE: 99.5 F | HEIGHT: 65 IN | SYSTOLIC BLOOD PRESSURE: 124 MMHG | WEIGHT: 145 LBS | HEART RATE: 118 BPM | DIASTOLIC BLOOD PRESSURE: 84 MMHG | OXYGEN SATURATION: 99 % | RESPIRATION RATE: 16 BRPM | BODY MASS INDEX: 24.16 KG/M2

## 2024-01-09 DIAGNOSIS — J06.9 UPPER RESPIRATORY TRACT INFECTION, UNSPECIFIED TYPE: Primary | ICD-10-CM

## 2024-01-09 LAB
BASOPHILS # BLD AUTO: 0 10E3/UL (ref 0–0.2)
BASOPHILS NFR BLD AUTO: 1 %
EOSINOPHIL # BLD AUTO: 0 10E3/UL (ref 0–0.7)
EOSINOPHIL NFR BLD AUTO: 0 %
ERYTHROCYTE [DISTWIDTH] IN BLOOD BY AUTOMATED COUNT: 12.4 % (ref 10–15)
HCT VFR BLD AUTO: 39.5 % (ref 35–47)
HGB BLD-MCNC: 13.1 G/DL (ref 11.7–15.7)
IMM GRANULOCYTES # BLD: 0 10E3/UL
IMM GRANULOCYTES NFR BLD: 0 %
LYMPHOCYTES # BLD AUTO: 2.4 10E3/UL (ref 0.8–5.3)
LYMPHOCYTES NFR BLD AUTO: 34 %
MCH RBC QN AUTO: 30.3 PG (ref 26.5–33)
MCHC RBC AUTO-ENTMCNC: 33.2 G/DL (ref 31.5–36.5)
MCV RBC AUTO: 91 FL (ref 78–100)
MONOCYTES # BLD AUTO: 0.5 10E3/UL (ref 0–1.3)
MONOCYTES NFR BLD AUTO: 7 %
NEUTROPHILS # BLD AUTO: 4 10E3/UL (ref 1.6–8.3)
NEUTROPHILS NFR BLD AUTO: 58 %
PLATELET # BLD AUTO: 351 10E3/UL (ref 150–450)
RBC # BLD AUTO: 4.33 10E6/UL (ref 3.8–5.2)
WBC # BLD AUTO: 7 10E3/UL (ref 4–11)

## 2024-01-09 PROCEDURE — 99212 OFFICE O/P EST SF 10 MIN: CPT | Performed by: PHYSICIAN ASSISTANT

## 2024-01-09 PROCEDURE — 85025 COMPLETE CBC W/AUTO DIFF WBC: CPT | Performed by: PHYSICIAN ASSISTANT

## 2024-01-09 PROCEDURE — 36415 COLL VENOUS BLD VENIPUNCTURE: CPT | Performed by: PHYSICIAN ASSISTANT

## 2024-01-09 RX ORDER — BENZONATATE 200 MG/1
200 CAPSULE ORAL 3 TIMES DAILY PRN
Qty: 30 CAPSULE | Refills: 0 | Status: SHIPPED | OUTPATIENT
Start: 2024-01-09 | End: 2024-06-26

## 2024-01-09 RX ORDER — BUDESONIDE AND FORMOTEROL FUMARATE DIHYDRATE 160; 4.5 UG/1; UG/1
2 AEROSOL RESPIRATORY (INHALATION) 2 TIMES DAILY
Qty: 6 G | Refills: 0 | Status: SHIPPED | OUTPATIENT
Start: 2024-01-09 | End: 2024-06-26

## 2024-01-09 ASSESSMENT — ENCOUNTER SYMPTOMS
SORE THROAT: 1
RHINORRHEA: 0
FEVER: 0
VOMITING: 0
COUGH: 1
FATIGUE: 1
HEADACHES: 1
MYALGIAS: 0
DIARRHEA: 0
NAUSEA: 0

## 2024-01-09 NOTE — PROGRESS NOTES
SUBJECTIVE:   Shelia Avendaño is a 63 year old female presenting with a chief complaint of   Chief Complaint   Patient presents with    Urgent Care    URI     Sick x 13 days, started with congestion, moved into chest. Fatigue. Covid PCR neg. Coughing has improved some since Saturday but last night has painful bumps on tongue and sore throat.        She is an established patient of [facility].  Patient presents with 2 weeks of cough that is improving.  Last night, ST and painful bumps on tongue.      Treatment:  [place] paxlovid; one tylenol daily, mucinex; honey, gargling with salt water.      Review of Systems   Constitutional:  Positive for fatigue. Negative for fever.   HENT:  Positive for sore throat. Negative for congestion, ear pain and rhinorrhea.    Respiratory:  Positive for cough.    Gastrointestinal:  Negative for diarrhea, nausea and vomiting.   Musculoskeletal:  Negative for myalgias.   Neurological:  Positive for headaches.   All other systems reviewed and are negative.      Past Medical History:   Diagnosis Date    Adjustment disorder with mixed anxiety and depressed mood 10/12/2011    Closed fracture of one or more phalanges of foot 1/19/2012    HSV infection 11/8/2017    Hypothyroid 10/8/2010    Osteoarthrosis, hand 7/20/2006    Overview:  LW Onset:  1990s ; DJD Hand    Osteopenia     Sinus problem      Family History   Problem Relation Age of Onset    Prostate Cancer Father 55    Thyroid Disease Mother      Current Outpatient Medications   Medication Sig Dispense Refill    ACYCLOVIR PO Take 100 mg by mouth daily       amitriptyline (ELAVIL) 10 MG tablet Take  by mouth At Bedtime.      benzonatate (TESSALON) 200 MG capsule Take 1 capsule (200 mg) by mouth 3 times daily as needed for cough 30 capsule 0    budesonide-formoterol (SYMBICORT) 160-4.5 MCG/ACT Inhaler Inhale 2 puffs into the lungs 2 times daily 6 g 0    calcium-magnesium (CALMAG) 500-250 MG TABS Take 1 tablet by mouth daily       "Levothyroxine Sodium (SYNTHROID PO) Take 188 mcg by mouth daily . 1 mg daily      Liothyronine Sodium (CYTOMEL PO) Take  by mouth. 1/2 tab daily      escitalopram (LEXAPRO) 5 MG tablet Take 5 mg by mouth daily      oxyCODONE (ROXICODONE) 5 MG tablet Take 5 mg by mouth every 4 hours as needed for severe pain or pain      TAMOXIFEN CITRATE PO       valACYclovir (VALTREX) 1000 mg tablet Take 1 tablet (1,000 mg) by mouth 3 times daily for 7 days 21 tablet 0     Social History     Tobacco Use    Smoking status: Never    Smokeless tobacco: Never   Substance Use Topics    Alcohol use: No     Alcohol/week: 0.0 standard drinks of alcohol       OBJECTIVE  /84   Pulse 118   Temp 99.5  F (37.5  C) (Temporal)   Resp 16   Ht 1.651 m (5' 5\")   Wt 65.8 kg (145 lb)   LMP 08/28/2013 (LMP Unknown)   SpO2 99%   BMI 24.13 kg/m      Physical Exam  Vitals and nursing note reviewed.   Constitutional:       Appearance: Normal appearance. She is normal weight.   HENT:      Head: Normocephalic and atraumatic.      Right Ear: Tympanic membrane, ear canal and external ear normal.      Left Ear: Tympanic membrane, ear canal and external ear normal.      Ears:      Comments: White fluid behind each TM     Nose: Nose normal.      Mouth/Throat:      Mouth: Mucous membranes are moist.      Pharynx: Oropharynx is clear.   Eyes:      Extraocular Movements: Extraocular movements intact.      Conjunctiva/sclera: Conjunctivae normal.   Cardiovascular:      Rate and Rhythm: Normal rate and regular rhythm.      Pulses: Normal pulses.      Heart sounds: Normal heart sounds.   Pulmonary:      Effort: Pulmonary effort is normal.      Breath sounds: Normal breath sounds. No wheezing or rhonchi.      Comments: Coughs often  Musculoskeletal:      Cervical back: Normal range of motion.   Skin:     General: Skin is warm and dry.      Findings: No rash.   Neurological:      General: No focal deficit present.      Mental Status: She is alert. "   Psychiatric:         Mood and Affect: Mood normal.         Behavior: Behavior normal.         Labs:  Results for orders placed or performed in visit on 01/09/24 (from the past 24 hour(s))   CBC with platelets and differential    Narrative    The following orders were created for panel order CBC with platelets and differential.  Procedure                               Abnormality         Status                     ---------                               -----------         ------                     CBC with platelets and d...[[id number]]                      Final result                 Please view results for these tests on the individual orders.   CBC with platelets and differential   Result Value Ref Range    WBC Count 7.0 4.0 - 11.0 10e3/uL    RBC Count 4.33 3.80 - 5.20 10e6/uL    Hemoglobin 13.1 11.7 - 15.7 g/dL    Hematocrit 39.5 35.0 - 47.0 %    MCV 91 78 - 100 fL    MCH 30.3 26.5 - 33.0 pg    MCHC 33.2 31.5 - 36.5 g/dL    RDW 12.4 10.0 - 15.0 %    Platelet Count 351 150 - 450 10e3/uL    % Neutrophils 58 %    % Lymphocytes 34 %    % Monocytes 7 %    % Eosinophils 0 %    % Basophils 1 %    % Immature Granulocytes 0 %    Absolute Neutrophils 4.0 1.6 - 8.3 10e3/uL    Absolute Lymphocytes 2.4 0.8 - 5.3 10e3/uL    Absolute Monocytes 0.5 0.0 - 1.3 10e3/uL    Absolute Eosinophils 0.0 0.0 - 0.7 10e3/uL    Absolute Basophils 0.0 0.0 - 0.2 10e3/uL    Absolute Immature Granulocytes 0.0 <=0.4 10e3/uL       ASSESSMENT:      ICD-10-CM    1. Upper respiratory tract infection, unspecified type  J06.9 CBC with platelets and differential     CBC with platelets and differential     budesonide-formoterol (SYMBICORT) 160-4.5 MCG/ACT Inhaler     benzonatate (TESSALON) 200 MG capsule           Medical Decision Making:    Differential Diagnosis:  URI Adult/Peds:  Bronchitis-viral, Viral pharyngitis, Viral syndrome, and Viral upper respiratory illness    Serious Comorbid Conditions:  Adult:   reviewed    PLAN:    Rx for symbicort  and tessalon perles.  Gargle with salt water.  Chloraseptic spray appropriate.  Discussed reasons to seek immediate medical attention.  Additionally if no improvement or worsening in one week, may follow up with PCP and/or UC.        Followup:    If not improving or if condition worsens, follow up with your Primary Care Provider, If not improving or if conditions worsens over the next 12-24 hours, go to the Emergency Department    There are no Patient Instructions on file for this visit.

## 2024-01-20 ENCOUNTER — OFFICE VISIT (OUTPATIENT)
Dept: URGENT CARE | Facility: URGENT CARE | Age: 64
End: 2024-01-20
Payer: COMMERCIAL

## 2024-01-20 VITALS
BODY MASS INDEX: 24.66 KG/M2 | HEIGHT: 65 IN | TEMPERATURE: 98.1 F | SYSTOLIC BLOOD PRESSURE: 115 MMHG | WEIGHT: 148 LBS | HEART RATE: 88 BPM | DIASTOLIC BLOOD PRESSURE: 77 MMHG | OXYGEN SATURATION: 97 %

## 2024-01-20 DIAGNOSIS — J01.40 ACUTE NON-RECURRENT PANSINUSITIS: Primary | ICD-10-CM

## 2024-01-20 LAB
DEPRECATED S PYO AG THROAT QL EIA: NEGATIVE
GROUP A STREP BY PCR: NOT DETECTED

## 2024-01-20 PROCEDURE — 87651 STREP A DNA AMP PROBE: CPT | Performed by: PHYSICIAN ASSISTANT

## 2024-01-20 PROCEDURE — 99213 OFFICE O/P EST LOW 20 MIN: CPT | Performed by: PHYSICIAN ASSISTANT

## 2024-01-20 RX ORDER — FLUCONAZOLE 150 MG/1
150 TABLET ORAL ONCE
Qty: 1 TABLET | Refills: 0 | Status: SHIPPED | OUTPATIENT
Start: 2024-01-20 | End: 2024-01-20

## 2024-01-20 NOTE — PROGRESS NOTES
Acute non-recurrent pansinusitis  - Streptococcus A Rapid Screen w/Reflex to PCR - Clinic Collect  - Group A Streptococcus PCR Throat Swab  - amoxicillin-clavulanate (AUGMENTIN) 875-125 MG tablet; Take 1 tablet by mouth 2 times daily for 10 days  - fluconazole (DIFLUCAN) 150 MG tablet; Take 1 tablet (150 mg) by mouth once for 1 dose    Age 12 months or more  Okay to use Zarbee's   Okay to use Rx Children Tylenol if prescribed (Dose based on weight)    Age 2-12:   Okay to use Children Motrin or Tylenol over the counter.    Adults:  Okay to take acetaminophen 500 mg- 2 tabs (Total of 1000 mg) every 8 hrs   Okay to take ibuprofen 200 mg- 3 tabs (Total of 600 mg) every 6 hours        Okay to use Neti pot for sinus lavage up to three times daily for congestion and sinus pressure if present. Daily hot shower can be beneficial for congestion and body aches. Okay to use bedroom vaporizer or humidifier if symptoms are worse at night. Nightly Vicks Vapor rub and 5-10 mg of Melatonin okay to use for sleep.     Over the counter cough medication and decongestants okay if not prescribed by me during this visit. For homeopathic alternatives to cough syrup and decongestant, feel free to try Elderberry extract.    Okay to use salt water gargles, warm tea (or warm water with lemon and honey), and lozenges for any throat discomfort. Chloraseptic spray is also highly encourages for throat pain/irritation.     Patient will need to get plenty of rest and drink at least 1.5-2 liters of fluids daily for adults and 1-1.5 liters for children. If vomiting and not tolerating liquids for more than 24 hrs, please go to your nearest emergency department for IV fluids and further treatment.     Patient is not contagious after 1 week from start of symptoms. If possible, wear mask for first 7 days. Wash hands regularly and vigorously for 30 seconds often.     Patient was advised to return to clinic for reevaluation (either UC or PCP) if symptoms  do not improve in 7 days. Patient educated on red flag symptoms and asked to go directly to the ED if these symptoms present themselves.       [facility]    Subjective   63 year old who presents to clinic today for the following health issues:    Urgent Care, Respiratory Problems, and Throat Pain       HPI     Acute Illness  Acute illness concerns: Pt in clinic to have eval for sore throat, cough, congestion and fatigue for 3 weeks and 3 days.   Symptoms:  Fever: No  Chills/Sweats: No  Headache (location?): YES  Sinus Pressure: YES  Conjunctivitis:  No  Ear Pain: no  Rhinorrhea: YES  Congestion: YES- chest congestion   Sore Throat: YES  Cough: YES  Wheeze: No but having chest pressure   Decreased Appetite: No  Nausea: No  Vomiting: No  Diarrhea: No  Dysuria/Freq.: No  Dysuria or Hematuria: No  Fatigue/Achiness: YES  Sick/Strep Exposure: None known. Patient has tested for covid-19 and was negative. Home test and PCR were both negative.   Therapies tried and outcome: Mucinex and Tylenol- cough drops and honey. Symbicort and tessalon Perles.     Review of Systems   Review of Systems   See HPI    Objective    Temp: 98.1  F (36.7  C) Temp src: Temporal BP: 115/77 Pulse: 88     SpO2: 97 %       Physical Exam   Physical Exam  Constitutional:       General: She is not in acute distress.     Appearance: Normal appearance. She is normal weight. She is not ill-appearing, toxic-appearing or diaphoretic.   HENT:      Head: Normocephalic and atraumatic.      Right Ear: Tympanic membrane, ear canal and external ear normal. There is no impacted cerumen.      Left Ear: Tympanic membrane, ear canal and external ear normal. There is no impacted cerumen.      Nose: Congestion and rhinorrhea present.      Right Sinus: Maxillary sinus tenderness and frontal sinus tenderness present.      Left Sinus: Maxillary sinus tenderness and frontal sinus tenderness present.      Mouth/Throat:      Mouth:  Mucous membranes are moist.      Pharynx: No oropharyngeal exudate or posterior oropharyngeal erythema.   Cardiovascular:      Rate and Rhythm: Normal rate and regular rhythm.      Pulses: Normal pulses.      Heart sounds: Normal heart sounds. No murmur heard.     No friction rub. No gallop.   Pulmonary:      Effort: Pulmonary effort is normal. No respiratory distress.      Breath sounds: No stridor. No wheezing, rhonchi or rales.   Chest:      Chest wall: No tenderness.   Lymphadenopathy:      Cervical: No cervical adenopathy.   Neurological:      General: No focal deficit present.      Mental Status: She is alert and oriented to person, place, and time. Mental status is at baseline.      Gait: Gait normal.   Psychiatric:         Mood and Affect: Mood normal.         Behavior: Behavior normal.         Thought Content: Thought content normal.         Judgment: Judgment normal.          Results for orders placed or performed in visit on 01/20/24 (from the past 24 hour(s))   Streptococcus A Rapid Screen w/Reflex to PCR - Clinic Collect    Specimen: Throat; Swab   Result Value Ref Range    Group A Strep antigen Negative Negative

## 2024-03-20 ENCOUNTER — ANCILLARY PROCEDURE (OUTPATIENT)
Dept: GENERAL RADIOLOGY | Facility: CLINIC | Age: 64
End: 2024-03-20
Attending: NURSE PRACTITIONER
Payer: COMMERCIAL

## 2024-03-20 ENCOUNTER — OFFICE VISIT (OUTPATIENT)
Dept: URGENT CARE | Facility: URGENT CARE | Age: 64
End: 2024-03-20
Payer: COMMERCIAL

## 2024-03-20 VITALS
DIASTOLIC BLOOD PRESSURE: 79 MMHG | SYSTOLIC BLOOD PRESSURE: 115 MMHG | OXYGEN SATURATION: 97 % | WEIGHT: 147 LBS | BODY MASS INDEX: 24.49 KG/M2 | RESPIRATION RATE: 16 BRPM | HEART RATE: 109 BPM | HEIGHT: 65 IN | TEMPERATURE: 98.1 F

## 2024-03-20 DIAGNOSIS — R09.82 POST-NASAL DRIP: ICD-10-CM

## 2024-03-20 DIAGNOSIS — R07.0 THROAT PAIN: Primary | ICD-10-CM

## 2024-03-20 LAB
BASOPHILS # BLD AUTO: 0 10E3/UL (ref 0–0.2)
BASOPHILS NFR BLD AUTO: 1 %
DEPRECATED S PYO AG THROAT QL EIA: NEGATIVE
EOSINOPHIL # BLD AUTO: 0.1 10E3/UL (ref 0–0.7)
EOSINOPHIL NFR BLD AUTO: 1 %
ERYTHROCYTE [DISTWIDTH] IN BLOOD BY AUTOMATED COUNT: 12.1 % (ref 10–15)
GROUP A STREP BY PCR: NOT DETECTED
HCT VFR BLD AUTO: 41.5 % (ref 35–47)
HGB BLD-MCNC: 13.6 G/DL (ref 11.7–15.7)
IMM GRANULOCYTES # BLD: 0 10E3/UL
IMM GRANULOCYTES NFR BLD: 0 %
LYMPHOCYTES # BLD AUTO: 2.6 10E3/UL (ref 0.8–5.3)
LYMPHOCYTES NFR BLD AUTO: 42 %
MCH RBC QN AUTO: 30.3 PG (ref 26.5–33)
MCHC RBC AUTO-ENTMCNC: 32.8 G/DL (ref 31.5–36.5)
MCV RBC AUTO: 92 FL (ref 78–100)
MONOCYTES # BLD AUTO: 0.6 10E3/UL (ref 0–1.3)
MONOCYTES NFR BLD AUTO: 9 %
MONOCYTES NFR BLD AUTO: NEGATIVE %
NEUTROPHILS # BLD AUTO: 2.9 10E3/UL (ref 1.6–8.3)
NEUTROPHILS NFR BLD AUTO: 47 %
PLATELET # BLD AUTO: 304 10E3/UL (ref 150–450)
RBC # BLD AUTO: 4.49 10E6/UL (ref 3.8–5.2)
WBC # BLD AUTO: 6.2 10E3/UL (ref 4–11)

## 2024-03-20 PROCEDURE — 86308 HETEROPHILE ANTIBODY SCREEN: CPT | Performed by: NURSE PRACTITIONER

## 2024-03-20 PROCEDURE — 87651 STREP A DNA AMP PROBE: CPT | Performed by: NURSE PRACTITIONER

## 2024-03-20 PROCEDURE — 85025 COMPLETE CBC W/AUTO DIFF WBC: CPT | Performed by: NURSE PRACTITIONER

## 2024-03-20 PROCEDURE — 99215 OFFICE O/P EST HI 40 MIN: CPT | Performed by: NURSE PRACTITIONER

## 2024-03-20 PROCEDURE — 70220 X-RAY EXAM OF SINUSES: CPT | Mod: TC | Performed by: RADIOLOGY

## 2024-03-20 PROCEDURE — 36415 COLL VENOUS BLD VENIPUNCTURE: CPT | Performed by: NURSE PRACTITIONER

## 2024-03-20 NOTE — PROGRESS NOTES
ICD-10-CM    1. Throat pain  R07.0 Streptococcus A Rapid Screen w/Reflex to PCR - Clinic Collect     Group A Streptococcus PCR Throat Swab     CBC with platelets and differential     XR Sinus Complete G/E 3 Views     CBC with platelets and differential     Mononucleosis screen     Mononucleosis screen      2. Post-nasal drip  R09.82         Ongoing complaints of deep pain far into her throat, particularly on the right side.  Recommended patient follow-up with ENT for another exam as all testing here is negative.  There is no evidence of sinus infection on blood work or x-ray.  Is possible this is coming from her esophagus and not pharynx.  Suggested she try some Maalox to see if this alleviates her symptoms.  If ENT sees her again and there are no findings gastroenterology as suggested as a next step.    Red flag warning signs and when to go to the emergency room.    44 minutes total time spent reviewing patient's chart, completing history and physical exam, establishing plan of care, and completing documentation.    Sinus x-rays show well aerated sinuses and no signs of sinusitis as read by this provider.    Labs:  Results for orders placed or performed in visit on 03/20/24 (from the past 24 hour(s))   Streptococcus A Rapid Screen w/Reflex to PCR - Clinic Collect    Specimen: Throat; Swab   Result Value Ref Range    Group A Strep antigen Negative Negative   CBC with platelets and differential    Narrative    The following orders were created for panel order CBC with platelets and differential.  Procedure                               Abnormality         Status                     ---------                               -----------         ------                     CBC with platelets and d...[[id number]]                      Final result                 Please view results for these tests on the individual orders.   CBC with platelets and differential   Result Value Ref Range    WBC Count 6.2 4.0 - 11.0 10e3/uL     RBC Count 4.49 3.80 - 5.20 10e6/uL    Hemoglobin 13.6 11.7 - 15.7 g/dL    Hematocrit 41.5 35.0 - 47.0 %    MCV 92 78 - 100 fL    MCH 30.3 26.5 - 33.0 pg    MCHC 32.8 31.5 - 36.5 g/dL    RDW 12.1 10.0 - 15.0 %    Platelet Count 304 150 - 450 10e3/uL    % Neutrophils 47 %    % Lymphocytes 42 %    % Monocytes 9 %    % Eosinophils 1 %    % Basophils 1 %    % Immature Granulocytes 0 %    Absolute Neutrophils 2.9 1.6 - 8.3 10e3/uL    Absolute Lymphocytes 2.6 0.8 - 5.3 10e3/uL    Absolute Monocytes 0.6 0.0 - 1.3 10e3/uL    Absolute Eosinophils 0.1 0.0 - 0.7 10e3/uL    Absolute Basophils 0.0 0.0 - 0.2 10e3/uL    Absolute Immature Granulocytes 0.0 <=0.4 10e3/uL   Mononucleosis screen   Result Value Ref Range    Mononucleosis Screen Negative Negative   XR Sinus Complete G/E 3 Views    Narrative    SINUS COMPLETE THREE OR MORE VIEWS 3/20/2024 3:36 PM     COMPARISON: None    HISTORY: Throat pain.      Impression    IMPRESSION: The paranasal sinuses are well aerated. No fractures.    AMINTA PRABHAKAR MD         SYSTEM ID:  [id number]       SUBJECTIVE:   Shelia Avendaño is a 63 year old female presenting with a chief complaint of   Chief Complaint   Patient presents with    Nasal Congestion     X7 weeks of post nasal drip     Nausea     Nausea from post nasal drip     Pharyngitis     Sore throat     Urgent Care     Inhaler given by ENT has not been helping    She had an URI in January, was given Amoxicillin and cough symptoms improved. Feels like she's had post nasal drip for a long time, since January, always feels like there is something plugged in the right side of throat, clears her  throat a lot, symptoms worse in the evening. She did see ENT and was given a nasal steroid spray , but this has not helped. Drinking tea helps provide some relief. She gets nauseated  from the postnasal drip.  She still has a significant sore throat.   Review of systems is negative except for as noted in the HPI.    OBJECTIVE  /79   Pulse 109   " Temp 98.1  F (36.7  C) (Temporal)   Resp 16   Ht 1.651 m (5' 5\")   Wt 66.7 kg (147 lb)   LMP 08/28/2013 (LMP Unknown)   SpO2 97%   BMI 24.46 kg/m      GENERAL: Alert, mild distress  SKIN: skin is clear, no rash or abnormal pigmentation  HEAD: The head is normocephalic.   EYES: The eyes are normal. The conjunctivae and cornea normal.   NECK: The neck is supple and thyroid is normal, no masses; LYMPH NODES: No adenopathy  HENT:  Bilateral tympanic membranes and canals appear normal, nasal passages are slightly swollen, pharynx is red with postnasal drip noted  LUNGS: The lung fields are clear to auscultation, no rales, rhonchi, wheezing or retractions  CV: Rhythm is regular. S1 and S2 are normal. No murmurs.  EXTREMITIES: Symmetric extremities no deformities  Psychiatric: Slightly anxious    MARILOU Ceja, CNP  [facility] Provider    The use of Dragon/[employer] dictation services may have been used to construct the content in this note; any grammatical or spelling errors are non-intentional. Please contact the author of this note directly if you are in need of any clarification.       "

## 2024-05-25 ENCOUNTER — HEALTH MAINTENANCE LETTER (OUTPATIENT)
Age: 64
End: 2024-05-25

## 2024-06-26 ENCOUNTER — OFFICE VISIT (OUTPATIENT)
Dept: FAMILY MEDICINE | Facility: CLINIC | Age: 64
End: 2024-06-26
Payer: COMMERCIAL

## 2024-06-26 VITALS
HEIGHT: 65 IN | WEIGHT: 143.2 LBS | TEMPERATURE: 97.6 F | OXYGEN SATURATION: 99 % | DIASTOLIC BLOOD PRESSURE: 78 MMHG | BODY MASS INDEX: 23.86 KG/M2 | RESPIRATION RATE: 16 BRPM | SYSTOLIC BLOOD PRESSURE: 119 MMHG | HEART RATE: 93 BPM

## 2024-06-26 DIAGNOSIS — E89.0 POSTABLATIVE HYPOTHYROIDISM: ICD-10-CM

## 2024-06-26 DIAGNOSIS — M81.0 OSTEOPOROSIS, UNSPECIFIED OSTEOPOROSIS TYPE, UNSPECIFIED PATHOLOGICAL FRACTURE PRESENCE: ICD-10-CM

## 2024-06-26 DIAGNOSIS — E05.00 GRAVES' DISEASE: ICD-10-CM

## 2024-06-26 DIAGNOSIS — S62.101D CLOSED FRACTURE OF RIGHT WRIST WITH ROUTINE HEALING, SUBSEQUENT ENCOUNTER: ICD-10-CM

## 2024-06-26 DIAGNOSIS — Z01.818 PREOP GENERAL PHYSICAL EXAM: Primary | ICD-10-CM

## 2024-06-26 DIAGNOSIS — Z85.3 PERSONAL HISTORY OF BREAST CANCER: ICD-10-CM

## 2024-06-26 PROBLEM — R45.89 DIFFICULTY COPING: Status: RESOLVED | Noted: 2023-05-19 | Resolved: 2024-06-26

## 2024-06-26 LAB
BASOPHILS # BLD AUTO: 0 10E3/UL (ref 0–0.2)
BASOPHILS NFR BLD AUTO: 0 %
EOSINOPHIL # BLD AUTO: 0.1 10E3/UL (ref 0–0.7)
EOSINOPHIL NFR BLD AUTO: 1 %
ERYTHROCYTE [DISTWIDTH] IN BLOOD BY AUTOMATED COUNT: 12.3 % (ref 10–15)
HCT VFR BLD AUTO: 42.6 % (ref 35–47)
HGB BLD-MCNC: 14 G/DL (ref 11.7–15.7)
IMM GRANULOCYTES # BLD: 0 10E3/UL
IMM GRANULOCYTES NFR BLD: 0 %
LYMPHOCYTES # BLD AUTO: 2.9 10E3/UL (ref 0.8–5.3)
LYMPHOCYTES NFR BLD AUTO: 45 %
MCH RBC QN AUTO: 30 PG (ref 26.5–33)
MCHC RBC AUTO-ENTMCNC: 32.9 G/DL (ref 31.5–36.5)
MCV RBC AUTO: 91 FL (ref 78–100)
MONOCYTES # BLD AUTO: 0.6 10E3/UL (ref 0–1.3)
MONOCYTES NFR BLD AUTO: 9 %
NEUTROPHILS # BLD AUTO: 2.9 10E3/UL (ref 1.6–8.3)
NEUTROPHILS NFR BLD AUTO: 45 %
PLATELET # BLD AUTO: 372 10E3/UL (ref 150–450)
RBC # BLD AUTO: 4.66 10E6/UL (ref 3.8–5.2)
WBC # BLD AUTO: 6.4 10E3/UL (ref 4–11)

## 2024-06-26 PROCEDURE — 85025 COMPLETE CBC W/AUTO DIFF WBC: CPT | Performed by: NURSE PRACTITIONER

## 2024-06-26 PROCEDURE — 99214 OFFICE O/P EST MOD 30 MIN: CPT | Performed by: NURSE PRACTITIONER

## 2024-06-26 PROCEDURE — 80048 BASIC METABOLIC PNL TOTAL CA: CPT | Performed by: NURSE PRACTITIONER

## 2024-06-26 PROCEDURE — 36415 COLL VENOUS BLD VENIPUNCTURE: CPT | Performed by: NURSE PRACTITIONER

## 2024-06-26 RX ORDER — LIOTHYRONINE SODIUM 5 UG/1
TABLET ORAL
COMMUNITY
Start: 2024-06-24

## 2024-06-26 RX ORDER — ACYCLOVIR 400 MG/1
TABLET ORAL
COMMUNITY
Start: 2024-04-04

## 2024-06-26 RX ORDER — MIRABEGRON 25 MG/1
1 TABLET, FILM COATED, EXTENDED RELEASE ORAL DAILY
COMMUNITY
Start: 2022-12-19

## 2024-06-26 RX ORDER — LOTILANER OPHTHALMIC SOLUTION 2.5 MG/ML
SOLUTION/ DROPS OPHTHALMIC
COMMUNITY
Start: 2024-05-16

## 2024-06-26 RX ORDER — LEVOTHYROXINE SODIUM 88 UG/1
TABLET ORAL
COMMUNITY
Start: 2024-04-04

## 2024-06-26 ASSESSMENT — PAIN SCALES - GENERAL: PAINLEVEL: NO PAIN (0)

## 2024-06-26 NOTE — PROGRESS NOTES
Preoperative Evaluation  [facility]  [address] [facility] [address]  Phone: [phone]  Primary Provider: Physician No Ref-Primary  Pre-op Performing Provider: MARILOU Rodgers CNP  Jun 26, 2024 6/26/2024   Surgical Information   What procedure is being done? right wrist surgery   Facility or Hospital where procedure/surgery will be performed: [facility]   Who is doing the procedure / surgery? Dr Janeth Hdez   Date of surgery / procedure: possibly 7/3/24   Time of surgery / procedure: unknown   Where do you plan to recover after surgery? at home with family        Fax number for surgical facility: [phone]    Assessment & Plan     The proposed surgical procedure is considered INTERMEDIATE risk.    Preop general physical exam  - Basic metabolic panel; Future  - CBC with Platelets & Differential; Future  - Basic metabolic panel  - CBC with Platelets & Differential    Closed fracture of right wrist with routine healing, subsequent encounter  With plans for surgical repair    Graves' disease, Postablative hypothyroidism  -on 88 mcg levothyroxine, last TSH stable 12/2023 per outside record review    Personal history of breast cancer  -grade 1 right IDC treated with lumpectomy and radiation, now in remission, following with [facility].    Osteoporosis, unspecified osteoporosis type, unspecified pathological fracture presence  -Currently on calcium and vitamin D supplementation, may be considering additional treatment post surgery.             - No identified additional risk factors other than previously addressed    Preoperative Medication Instructions  Antiplatelet or Anticoagulation Medication Instructions   - Patient is on no antiplatelet or anticoagulation medications.   - Bleeding risk is low for this procedure (e.g. dental, skin, cataract).    Additional Medication Instructions     - Herbal medications and vitamins: DO NOT TAKE 14 days prior to  surgery.   - Opioids: Continue without modification.    - SSRIs, SNRIs, TCAs, Antipsychotics: Continue without modification.   -Tylenol, magnesium, and melatonin okay as needed for sleep. Calcium and vitamin D okay to take.     Naltrexone: Stop 3 days before surgery, do not resume until off all opioid medications for 7 days .    Recommendation  Approval given to proceed with proposed procedure, without further diagnostic evaluation.    Bg Bass is a 63 year old, presenting for the following:  Pre-Op Exam          6/26/2024     1:06 PM   Additional Questions   Roomed by Jennifer SPEARS   Accompanied by self     HPI related to upcoming procedure: Was hiking [place] [facility] last week, fell and broke her right wrist.         6/26/2024   Pre-Op Questionnaire   Have you ever had a heart attack or stroke? No   Have you ever had surgery on your heart or blood vessels, such as a stent placement, a coronary artery bypass, or surgery on an artery in your head, neck, heart, or legs? No   Do you have chest pain with activity? No   Do you have a history of heart failure? No   Do you currently have a cold, bronchitis or symptoms of other infection? No   Do you have a cough, shortness of breath, or wheezing? No   Do you or anyone in your family have previous history of blood clots? No   Do you or does anyone in your family have a serious bleeding problem such as prolonged bleeding following surgeries or cuts? No   Have you ever had problems with anemia or been told to take iron pills? No   Have you had any abnormal blood loss such as black, tarry or bloody stools, or abnormal vaginal bleeding? No   Have you ever had a blood transfusion? No   Are you willing to have a blood transfusion if it is medically needed before, during, or after your surgery? Yes   Have you or any of your relatives ever had problems with anesthesia? No   Do you have sleep apnea, excessive snoring or daytime drowsiness? No   Do you have any artifical heart valves  or other implanted medical devices like a pacemaker, defibrillator, or continuous glucose monitor? No   Do you have artificial joints? No   Are you allergic to latex? No        Health Care Directive  Patient does not have a Health Care Directive or Living Will: Patient states has Advance Directive and will bring in a copy to clinic.    Preoperative Review of    reviewed - no record of controlled substances prescribed.Status of Chronic Conditions:  HYPOTHYROIDISM - Patient has a longstanding history of chronic Hypothyroidism. Patient has been doing well, noting no tremor, insomnia, hair loss or changes in skin texture. Continues to take medications as directed, without adverse reactions or side effects. Last TSH from [facility] 12/26/2023 0.17, T4 1.3, T3 3.0    Patient Active Problem List    Diagnosis Date Noted    Difficulty coping 05/19/2023     Priority: Medium    Estrogen receptor positive status (ER+) 05/19/2023     Priority: Medium    Pilar cyst of scalp 05/19/2023     Priority: Medium    Unspecified dyspareunia 05/19/2023     Priority: Medium    Irritable larynx syndrome 06/19/2018     Priority: Medium    Pap smear of cervix with ASCUS, cannot exclude HGSIL 12/21/2017     Priority: Medium     Overview:   7/24/2014 ASC-H / HPV Negative  9/8/2014 [facility]: Unremarkable endocervical tissue  11/3/2014 LEEP: SARA I, Clear Margins   8/4/2015 ASCUS/HPV Negative   11/10/2015 [facility]: ECC SARA I   11/8/2016 ASCUS/HPV Negative  11/3/2017 NIL/HPV Negative    Plan: Pap/HPV 11/2018      Personal history of breast cancer 01/31/2017     Priority: Medium    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      Postablative hypothyroidism 10/13/2016     Priority: Medium     H/o hyperthyroidism      Malignant neoplasm of right female breast (H) 10/13/2016     Priority: Medium    Osteopenia      Priority: Medium    Dysphonia 05/18/2015     Priority: Medium    Backache 12/06/2012      Priority: Medium    Persistent disorder of initiating or maintaining sleep 01/17/2012     Priority: Medium    Gluten intolerance 08/25/2011     Priority: Medium    Preventative health care 03/15/2011     Priority: Medium     Formatting of this note might be different from the original.  See record scanned in epic      Graves' disease 10/08/2010     Priority: Medium    Recurrent genital HSV (herpes simplex virus) infection 10/08/2010     Priority: Medium    S/P radioactive iodine thyroid ablation 10/08/2010     Priority: Medium    Middle insomnia 10/22/2008     Priority: Medium    Initial insomnia 10/22/2008     Priority: Medium      Past Medical History:   Diagnosis Date    Adjustment disorder with mixed anxiety and depressed mood 10/12/2011    Cancer (H) 2016    breast cancer    Closed fracture of one or more phalanges of foot 01/19/2012    HSV infection 11/08/2017    Hypothyroid 10/08/2010    Osteoarthrosis, hand 07/20/2006    Overview:  LW Onset:  1990s ; DJD Hand    Osteopenia     Sinus problem      Past Surgical History:   Procedure Laterality Date    BREAST SURGERY  2016, 2017    breast cancer lumpectomy and restorative surgery    COLONOSCOPY  2017    OPEN REDUCTION INTERNAL FIXATION WRIST Left 07/07/2022    Procedure: Open reduction and internal fixation of the left distal radius fracture with volar plating;  Surgeon: Efraín Dalton DO;  Location: [facility] OR    TONSILLECTOMY       Current Outpatient Medications   Medication Sig Dispense Refill    acyclovir (ZOVIRAX) 400 MG tablet       amitriptyline (ELAVIL) 10 MG tablet Take  by mouth At Bedtime.      calcium-magnesium (CALMAG) 500-250 MG TABS Take 1 tablet by mouth daily      levothyroxine (SYNTHROID/LEVOTHROID) 88 MCG tablet       liothyronine (CYTOMEL) 5 MCG tablet       MAGNESIUM PO       mirabegron (MYRBETRIQ) 25 MG 24 hr tablet Take 1 tablet by mouth daily      NALTREXONE HCL PO 3 mg      Prasterone, DHEA, (DHEA PO) 3.25 mg/ 0.404gm bioadhesive  "vag gel      TRYPTOPHAN PO       VITAMIN D PO       XDEMVY 0.25 % SOLN       valACYclovir (VALTREX) 1000 mg tablet Take 1 tablet (1,000 mg) by mouth 3 times daily for 7 days 21 tablet 0       Allergies   Allergen Reactions    Other Environmental Allergy      PN: LW CM1: >>> NO CONTRAST ADVERSE REACTION <<<     Reaction :        Social History     Tobacco Use    Smoking status: Never    Smokeless tobacco: Never   Substance Use Topics    Alcohol use: Yes     Comment: 1 glass a week, not now       History   Drug Use No               Objective    /78   Pulse 93   Temp 97.6  F (36.4  C) (Tympanic)   Resp 16   Ht 1.651 m (5' 5\")   Wt 65 kg (143 lb 3.2 oz)   LMP 08/28/2013 (LMP Unknown)   SpO2 99%   BMI 23.83 kg/m     Estimated body mass index is 23.83 kg/m  as calculated from the following:    Height as of this encounter: 1.651 m (5' 5\").    Weight as of this encounter: 65 kg (143 lb 3.2 oz).  Physical Exam  GENERAL: alert and no distress  EYES: Eyes grossly normal to inspection, PERRL and conjunctivae and sclerae normal  HENT: ear canals and TM's normal, nose and mouth without ulcers or lesions  NECK: no adenopathy, no asymmetry, masses, or scars  RESP: lungs clear to auscultation - no rales, rhonchi or wheezes  CV: regular rate and rhythm, normal S1 S2, no S3 or S4, no murmur, click or rub, no peripheral edema  ABDOMEN: soft, nontender, no hepatosplenomegaly, no masses and bowel sounds normal  MS: no gross musculoskeletal defects noted, no edema, right wrist and forearm in cast, right hand/fingers mildly swollen  SKIN: no suspicious lesions or rashes  NEURO: Normal strength and tone, mentation intact and speech normal  PSYCH: mentation appears normal, affect normal/bright    Recent Labs     Recent Results (from the past 168 hour(s))   Basic metabolic panel   Result Value Ref Range Status    Sodium 139 135 - 145 mmol/L Final    Potassium 4.3 3.4 - 5.3 mmol/L Final    Chloride 102 98 - 107 mmol/L Final    " Carbon Dioxide (CO2) 28 22 - 29 mmol/L Final    Anion Gap 9 7 - 15 mmol/L Final    Urea Nitrogen 18.4 8.0 - 23.0 mg/dL Final    Creatinine 1.22 (H) 0.51 - 0.95 mg/dL Final    GFR Estimate 50 (L) >60 mL/min/1.73m2 Final    Calcium 9.6 8.8 - 10.2 mg/dL Final    Glucose 83 70 - 99 mg/dL Final   CBC with platelets and differential   Result Value Ref Range Status    WBC Count 6.4 4.0 - 11.0 10e3/uL Final    RBC Count 4.66 3.80 - 5.20 10e6/uL Final    Hemoglobin 14.0 11.7 - 15.7 g/dL Final    Hematocrit 42.6 35.0 - 47.0 % Final    MCV 91 78 - 100 fL Final    MCH 30.0 26.5 - 33.0 pg Final    MCHC 32.9 31.5 - 36.5 g/dL Final    RDW 12.3 10.0 - 15.0 % Final    Platelet Count 372 150 - 450 10e3/uL Final    % Neutrophils 45 % Final    % Lymphocytes 45 % Final    % Monocytes 9 % Final    % Eosinophils 1 % Final    % Basophils 0 % Final    % Immature Granulocytes 0 % Final    Absolute Neutrophils 2.9 1.6 - 8.3 10e3/uL Final    Absolute Lymphocytes 2.9 0.8 - 5.3 10e3/uL Final    Absolute Monocytes 0.6 0.0 - 1.3 10e3/uL Final    Absolute Eosinophils 0.1 0.0 - 0.7 10e3/uL Final    Absolute Basophils 0.0 0.0 - 0.2 10e3/uL Final    Absolute Immature Granulocytes 0.0 <=0.4 10e3/uL Final     *Note: Due to a large number of results and/or encounters for the requested time period, some results have not been displayed. A complete set of results can be found in Results Review.         Diagnostics  Labs pending at this time.  Results will be reviewed when available.   No EKG required, no history of coronary heart disease, significant arrhythmia, peripheral arterial disease or other structural heart disease.    Revised Cardiac Risk Index (RCRI)  The patient has the following serious cardiovascular risks for perioperative complications:   - No serious cardiac risks = 0 points     RCRI Interpretation: 0 points: Class I (very low risk - 0.4% complication rate)         Signed Electronically by: MARILOU Rodgers CNP  Copy of this evaluation  report is provided to requesting physician.

## 2024-06-26 NOTE — PATIENT INSTRUCTIONS
How to Take Your Medication Before Surgery  Preoperative Medication Instructions   Antiplatelet or Anticoagulation Medication Instructions   - Patient is on no antiplatelet or anticoagulation medications.   - Bleeding risk is low for this procedure (e.g. dental, skin, cataract).    Additional Medication Instructions     - Herbal medications and vitamins: DO NOT TAKE 14 days prior to surgery.   - Opioids: Continue without modification.    - SSRIs, SNRIs, TCAs, Antipsychotics: Continue without modification.   -Tylenol, magnesium, and melatonin okay as needed for sleep. Calcium and vitamin D okay to take.     Naltrexone: Stop 3 days before surgery, do not resume until off all opioid medications for 7 days .       Patient Education   Preparing for Your Surgery  Getting started  A nurse will call you to review your health history and instructions. They will give you an arrival time based on your scheduled surgery time. Please be ready to share:  Your doctor's clinic name and phone number  Your medical, surgical, and anesthesia history  A list of allergies and sensitivities  A list of medicines, including herbal treatments and over-the-counter drugs  Whether the patient has a legal guardian (ask how to send us the papers in advance)  Please tell us if you're pregnant--or if there's any chance you might be pregnant. Some surgeries may injure a fetus (unborn baby), so they require a pregnancy test. Surgeries that are safe for a fetus don't always need a test, and you can choose whether to have one.   If you have a child who's having surgery, please ask for a copy of Preparing for Your Child's Surgery.    Preparing for surgery  Within 10 to 30 days of surgery: Have a pre-op exam (sometimes called an H&P, or History and Physical). This can be done at a clinic or pre-operative center.  If you're having a , you may not need this exam. Talk to your care team.  At your pre-op exam, talk to your care team about all  medicines you take. If you need to stop any medicines before surgery, ask when to start taking them again.  We do this for your safety. Many medicines can make you bleed too much during surgery. Some change how well surgery (anesthesia) drugs work.  Call your insurance company to let them know you're having surgery. (If you don't have insurance, call [phone].)  Call your clinic if there's any change in your health. This includes signs of a cold or flu (sore throat, runny nose, cough, rash, fever). It also includes a scrape or scratch near the surgery site.  If you have questions on the day of surgery, call your hospital or surgery center.  Eating and drinking guidelines  For your safety: Unless your surgeon tells you otherwise, follow the guidelines below.  Eat and drink as usual until 8 hours before you arrive for surgery. After that, no food or milk.  Drink clear liquids until 2 hours before you arrive. These are liquids you can see through, like water, Gatorade, and Propel Water. They also include plain black coffee and tea (no cream or milk), candy, and breath mints. You can spit out gum when you arrive.  If you drink alcohol: Stop drinking it the night before surgery.  If your care team tells you to take medicine on the morning of surgery, it's okay to take it with a sip of water.  Preventing infection  Shower or bathe the night before and morning of your surgery. Follow the instructions your clinic gave you. (If no instructions, use regular soap.)  Don't shave or clip hair near your surgery site. We'll remove the hair if needed.  Don't smoke or vape the morning of surgery. You may chew nicotine gum up to 2 hours before surgery. A nicotine patch is okay.  Note: Some surgeries require you to completely quit smoking and nicotine. Check with your surgeon.  Your care team will make every effort to keep you safe from infection. We will:  Clean our hands often with soap and water (or an alcohol-based hand  rub).  Clean the skin at your surgery site with a special soap that kills germs.  Give you a special gown to keep you warm. (Cold raises the risk of infection.)  Wear special hair covers, masks, gowns and gloves during surgery.  Give antibiotic medicine, if prescribed. Not all surgeries need antibiotics.  What to bring on the day of surgery  Photo ID and insurance card  Copy of your health care directive, if you have one  Glasses and hearing aids (bring cases)  You can't wear contacts during surgery  Inhaler and eye drops, if you use them (tell us about these when you arrive)  CPAP machine or breathing device, if you use them  A few personal items, if spending the night  If you have . . .  A pacemaker, ICD (cardiac defibrillator) or other implant: Bring the ID card.  An implanted stimulator: Bring the remote control.  A legal guardian: Bring a copy of the certified (court-stamped) guardianship papers.  Please remove any jewelry, including body piercings. Leave jewelry and other valuables at home.  If you're going home the day of surgery  You must have a responsible adult drive you home. They should stay with you overnight as well.  If you don't have someone to stay with you, and you aren't safe to go home alone, we may keep you overnight. Insurance often won't pay for this.  After surgery  If it's hard to control your pain or you need more pain medicine, please call your surgeon's office.  Questions?   If you have any questions for your care team, list them here: _________________________________________________________________________________________________________________________________________________________________________ ____________________________________ ____________________________________ ____________________________________  For informational purposes only. Not to replace the advice of your health care provider. Copyright   2003, 2019 [facility]. All rights reserved. Clinically reviewed  by Angela Mckeon MD. [id number] - REV 12/22.

## 2024-06-27 LAB
ANION GAP SERPL CALCULATED.3IONS-SCNC: 9 MMOL/L (ref 7–15)
BUN SERPL-MCNC: 18.4 MG/DL (ref 8–23)
CALCIUM SERPL-MCNC: 9.6 MG/DL (ref 8.8–10.2)
CHLORIDE SERPL-SCNC: 102 MMOL/L (ref 98–107)
CREAT SERPL-MCNC: 1.22 MG/DL (ref 0.51–0.95)
DEPRECATED HCO3 PLAS-SCNC: 28 MMOL/L (ref 22–29)
EGFRCR SERPLBLD CKD-EPI 2021: 50 ML/MIN/1.73M2
GLUCOSE SERPL-MCNC: 83 MG/DL (ref 70–99)
POTASSIUM SERPL-SCNC: 4.3 MMOL/L (ref 3.4–5.3)
SODIUM SERPL-SCNC: 139 MMOL/L (ref 135–145)

## 2024-06-28 NOTE — PROGRESS NOTES
Spoke with patient to rely message and she understood. She asked to schedule in [facility] as it is closer to her and also wanted Sunday appt vs Monday.     Monica JUNIOR,    [facility]

## 2024-06-28 NOTE — PROGRESS NOTES
Spoke with patient to rely message and she understood. She asked to schedule in [facility] as it is closer to her and also wanted Sunday appt vs Monday.      Monica JUNIOR,    [facility]

## 2024-06-28 NOTE — RESULT ENCOUNTER NOTE
Please call patient and ask her to come in to have her labs rechecked on Monday or early next week prior to her surgery? Her creatinine was elevated and her GFR is low, this is most likely due to dehydration. I would like her to drink extra fluids this weekend and have it rechecked to make sure her kidney function is stable prior to surgery.

## 2024-06-28 NOTE — RESULT ENCOUNTER NOTE
Spoke with patient to rely message and she understood. She asked to schedule in [facility] as it is closer to her and also wanted Sunday appt vs Monday.      Monica JUNIOR,    [facility]

## 2024-06-30 ENCOUNTER — LAB (OUTPATIENT)
Dept: LAB | Facility: CLINIC | Age: 64
End: 2024-06-30
Payer: COMMERCIAL

## 2024-06-30 DIAGNOSIS — Z01.818 PREOP GENERAL PHYSICAL EXAM: ICD-10-CM

## 2024-06-30 LAB
ANION GAP SERPL CALCULATED.3IONS-SCNC: 12 MMOL/L (ref 7–15)
BUN SERPL-MCNC: 10.8 MG/DL (ref 8–23)
CALCIUM SERPL-MCNC: 9.7 MG/DL (ref 8.8–10.2)
CHLORIDE SERPL-SCNC: 100 MMOL/L (ref 98–107)
CREAT SERPL-MCNC: 0.72 MG/DL (ref 0.51–0.95)
DEPRECATED HCO3 PLAS-SCNC: 26 MMOL/L (ref 22–29)
EGFRCR SERPLBLD CKD-EPI 2021: >90 ML/MIN/1.73M2
GLUCOSE SERPL-MCNC: 93 MG/DL (ref 70–99)
POTASSIUM SERPL-SCNC: 3.7 MMOL/L (ref 3.4–5.3)
SODIUM SERPL-SCNC: 138 MMOL/L (ref 135–145)

## 2024-06-30 PROCEDURE — 36415 COLL VENOUS BLD VENIPUNCTURE: CPT

## 2024-06-30 PROCEDURE — 80048 BASIC METABOLIC PNL TOTAL CA: CPT

## 2024-07-31 ENCOUNTER — PATIENT OUTREACH (OUTPATIENT)
Dept: FAMILY MEDICINE | Facility: CLINIC | Age: 64
End: 2024-07-31
Payer: COMMERCIAL

## 2024-07-31 NOTE — TELEPHONE ENCOUNTER
Patient Quality Outreach    Patient is due for the following:   Physical Preventive Adult Physical      Topic Date Due    Pneumococcal Vaccine (1 of 2 - PCV) Never done    COVID-19 Vaccine (6 - 2023-24 season) 09/01/2023       Next Steps:   Schedule a Adult Preventative    Type of outreach:    Sent [employer] message.      Questions for provider review:    None           CARLOS HEARD MA

## 2024-12-26 ENCOUNTER — PATIENT OUTREACH (OUTPATIENT)
Dept: FAMILY MEDICINE | Facility: CLINIC | Age: 64
End: 2024-12-26
Payer: COMMERCIAL

## 2024-12-26 NOTE — TELEPHONE ENCOUNTER
Patient Quality Outreach    Patient is due for the following:   Breast Cancer Screening - Mammogram  Physical Preventive Adult Physical      Topic Date Due    Pneumococcal Vaccine (1 of 1 - PCV) Never done       Action(s) Taken:   Schedule a Adult Preventative    Type of outreach:    Sent [employer] message.    Questions for provider review:    None           CARLOS HEARD MA

## 2025-02-22 ENCOUNTER — HEALTH MAINTENANCE LETTER (OUTPATIENT)
Age: 65
End: 2025-02-22

## 2025-06-14 ENCOUNTER — HEALTH MAINTENANCE LETTER (OUTPATIENT)
Age: 65
End: 2025-06-14

## (undated) DEVICE — GLOVE PROTEXIS BLUE W/NEU-THERA 7.5  2D73EB75

## (undated) DEVICE — CAST PADDING 3" UNSTERILE 9043

## (undated) DEVICE — GLOVE PROTEXIS POWDER FREE 6.5 ORTHOPEDIC 2D73ET65

## (undated) DEVICE — BAG CLEAR TRASH 1.3M 39X33" P4040C

## (undated) DEVICE — SUCTION CANISTER MEDIVAC LINER 3000ML W/LID 65651-530

## (undated) DEVICE — Device

## (undated) DEVICE — PREP POVIDONE IODINE SOLUTION 10% 4OZ BOTTLE 29906-004

## (undated) DEVICE — DRILL BIT HANDINN 2.5MM DB2.5

## (undated) DEVICE — LINEN FULL SHEET 5511

## (undated) DEVICE — GLOVE PROTEXIS BLUE W/NEU-THERA 6.5  2D73EB65

## (undated) DEVICE — LINEN HALF SHEET 5512

## (undated) DEVICE — CAST PLASTER SPLINT 4X15" EXTRA FAST

## (undated) DEVICE — TOURNIQUET SGL BLADDER 18"X4" RED 5921-218-135

## (undated) DEVICE — ESU GROUND PAD ADULT W/CORD E7507

## (undated) DEVICE — IMP PEG HANDINN 2.5X16MM PT TP16000
Type: IMPLANTABLE DEVICE | Site: WRIST | Status: NON-FUNCTIONAL
Removed: 2022-07-07

## (undated) DEVICE — SU VICRYL 2-0 CT-2 27" UND J269H

## (undated) DEVICE — DRAPE STERI TOWEL LG 1010

## (undated) DEVICE — SU MONOCRYL 4-0 PS-2 18" UND Y496G

## (undated) DEVICE — SLING ARM MED 79-99155

## (undated) DEVICE — GLOVE PROTEXIS POWDER FREE 7.5 ORTHOPEDIC 2D73ET75

## (undated) DEVICE — SOL WATER IRRIG 1000ML BOTTLE 2F7114

## (undated) DEVICE — SOL NACL 0.9% IRRIG 1000ML BOTTLE 2F7124

## (undated) DEVICE — STPL SKIN 35W 6.9MM  PXW35

## (undated) DEVICE — PREP CHLORAPREP 26ML TINTED HI-LITE ORANGE 930815

## (undated) DEVICE — CAST BUCKET

## (undated) DEVICE — PACK HAND SOP32HARMO

## (undated) DEVICE — ESU PENCIL W/HOLSTER E2350H

## (undated) RX ORDER — FENTANYL CITRATE 50 UG/ML
INJECTION, SOLUTION INTRAMUSCULAR; INTRAVENOUS
Status: DISPENSED
Start: 2022-07-07

## (undated) RX ORDER — BUPIVACAINE HYDROCHLORIDE 5 MG/ML
INJECTION, SOLUTION EPIDURAL; INTRACAUDAL
Status: DISPENSED
Start: 2022-07-07

## (undated) RX ORDER — PROPOFOL 10 MG/ML
INJECTION, EMULSION INTRAVENOUS
Status: DISPENSED
Start: 2022-07-07

## (undated) RX ORDER — CEFAZOLIN SODIUM/WATER 2 G/20 ML
SYRINGE (ML) INTRAVENOUS
Status: DISPENSED
Start: 2022-07-07